# Patient Record
Sex: FEMALE | Race: WHITE | NOT HISPANIC OR LATINO | Employment: FULL TIME | URBAN - METROPOLITAN AREA
[De-identification: names, ages, dates, MRNs, and addresses within clinical notes are randomized per-mention and may not be internally consistent; named-entity substitution may affect disease eponyms.]

---

## 2017-02-01 ENCOUNTER — OFFICE VISIT (OUTPATIENT)
Dept: FAMILY MEDICINE | Facility: OTHER | Age: 57
End: 2017-02-01
Payer: COMMERCIAL

## 2017-02-01 VITALS
BODY MASS INDEX: 32.96 KG/M2 | RESPIRATION RATE: 18 BRPM | TEMPERATURE: 98.3 F | SYSTOLIC BLOOD PRESSURE: 118 MMHG | WEIGHT: 186 LBS | HEIGHT: 63 IN | DIASTOLIC BLOOD PRESSURE: 68 MMHG | HEART RATE: 76 BPM

## 2017-02-01 DIAGNOSIS — J06.9 UPPER RESPIRATORY TRACT INFECTION, UNSPECIFIED TYPE: ICD-10-CM

## 2017-02-01 DIAGNOSIS — H10.023 PINK EYE DISEASE OF BOTH EYES: Primary | ICD-10-CM

## 2017-02-01 PROCEDURE — 99203 OFFICE O/P NEW LOW 30 MIN: CPT | Performed by: NURSE PRACTITIONER

## 2017-02-01 RX ORDER — AZITHROMYCIN 250 MG/1
TABLET, FILM COATED ORAL
Qty: 6 TABLET | Refills: 0 | Status: SHIPPED | OUTPATIENT
Start: 2017-02-01 | End: 2017-10-17

## 2017-02-01 RX ORDER — POLYMYXIN B SULFATE AND TRIMETHOPRIM 1; 10000 MG/ML; [USP'U]/ML
1 SOLUTION OPHTHALMIC 4 TIMES DAILY
Qty: 2 ML | Refills: 0 | Status: SHIPPED | OUTPATIENT
Start: 2017-02-01 | End: 2017-02-08

## 2017-02-01 ASSESSMENT — PAIN SCALES - GENERAL: PAINLEVEL: NO PAIN (0)

## 2017-02-01 NOTE — NURSING NOTE
"Chief Complaint   Patient presents with     Cough       Initial /68 mmHg  Pulse 76  Temp(Src) 98.3  F (36.8  C) (Oral)  Resp 18  Ht 5' 3\" (1.6 m)  Wt 186 lb (84.369 kg)  BMI 32.96 kg/m2 Estimated body mass index is 32.96 kg/(m^2) as calculated from the following:    Height as of this encounter: 5' 3\" (1.6 m).    Weight as of this encounter: 186 lb (84.369 kg).  BP completed using cuff size: regular    "

## 2017-02-01 NOTE — PATIENT INSTRUCTIONS
Please take antibiotic with a probiotic or yogurt. Return to clinic if symptoms worsen or do not improve with treatment.    Thank you  Halie Hernandes CNP

## 2017-02-01 NOTE — MR AVS SNAPSHOT
"              After Visit Summary   2/1/2017    Shelbi Howard    MRN: [id number]           Patient Information     Date Of Birth          1960        Visit Information        Provider Department      2/1/2017 1:20 PM Halie Hernandes APRN CNP [facility]        Today's Diagnoses     Pink eye disease of both eyes    -  1     Upper respiratory tract infection, unspecified type           Care Instructions    Please take antibiotic with a probiotic or yogurt. Return to clinic if symptoms worsen or do not improve with treatment.    Thank you  Halie Hernandes CNP          Follow-ups after your visit        Who to contact     If you have questions or need follow up information about today's clinic visit or your schedule please contact [facility] directly at [phone].  Normal or non-critical lab and imaging results will be communicated to you by MyChart, letter or phone within 4 business days after the clinic has received the results. If you do not hear from us within 7 days, please contact the clinic through MyChart or phone. If you have a critical or abnormal lab result, we will notify you by phone as soon as possible.  Submit refill requests through [employer] or call your pharmacy and they will forward the refill request to us. Please allow 3 business days for your refill to be completed.          Additional Information About Your Visit        MyChart Information     [employer] lets you send messages to your doctor, view your test results, renew your prescriptions, schedule appointments and more. To sign up, go to [email or web address] . Click on \"Log in\" on the left side of the screen, which will take you to the Welcome page. Then click on \"Sign up Now\" on the right side of the page.     You will be asked to enter the access code listed below, as well as some personal information. Please follow the directions to create your username and password.     Your access code is: " "[email or web address]  Expires: 2017  1:58 PM     Your access code will  in 90 days. If you need help or a new code, please call your [place] clinic or [phone].        Care EveryWhere ID     This is your Care EveryWhere ID. This could be used by other organizations to access your [place] medical records  [id number]        Your Vitals Were     Pulse Temperature Respirations Height BMI (Body Mass Index)       76 98.3  F (36.8  C) (Oral) 18 5' 3\" (1.6 m) 32.96 kg/m2        Blood Pressure from Last 3 Encounters:   17 118/68    Weight from Last 3 Encounters:   17 186 lb (84.369 kg)              Today, you had the following     No orders found for display         Today's Medication Changes          These changes are accurate as of: 17  1:58 PM.  If you have any questions, ask your nurse or doctor.               Start taking these medicines.        Dose/Directions    azithromycin 250 MG tablet   Commonly known as:  ZITHROMAX   Used for:  Upper respiratory tract infection, unspecified type   Started by:  Halie Hernandes APRN CNP        Two tablets first day, then one tablet daily for four days.   Quantity:  6 tablet   Refills:  0       trimethoprim-polymyxin b ophthalmic solution   Commonly known as:  POLYTRIM   Used for:  Pink eye disease of both eyes   Started by:  Halie Hernandes APRN CNP        Dose:  1 drop   Place 1 drop into both eyes 4 times daily for 7 days   Quantity:  2 mL   Refills:  0            Where to get your medicines      These medications were sent to [facility] [address] [place] [address] [facility]  [address] [facility] [address]     Phone:  [phone]    - azithromycin 250 MG tablet  - trimethoprim-polymyxin b ophthalmic solution             Primary Care Provider    None Specified       No primary provider on file.        Thank you!     Thank you for choosing [facility] [place]  for your care. Our goal is always to provide you " with excellent care. Hearing back from our patients is one way we can continue to improve our services. Please take a few minutes to complete the written survey that you may receive in the mail after your visit with us. Thank you!             Your Updated Medication List - Protect others around you: Learn how to safely use, store and throw away your medicines at www.disposemymeds.org.          This list is accurate as of: 2/1/17  1:58 PM.  Always use your most recent med list.                   Brand Name Dispense Instructions for use    azithromycin 250 MG tablet    ZITHROMAX    6 tablet    Two tablets first day, then one tablet daily for four days.       trimethoprim-polymyxin b ophthalmic solution    POLYTRIM    2 mL    Place 1 drop into both eyes 4 times daily for 7 days

## 2017-02-01 NOTE — PROGRESS NOTES
"  SUBJECTIVE:                                                    Shelbi Howard is a 57 year old female who presents to clinic today for the following health issues:      HPI    Acute Illness   Acute illness concerns: Cough  Onset: Friday or Saturday      Fever: no     Chills/Sweats: YES    Headache (location?): YES    Sinus Pressure:YES    Conjunctivitis:  YES: both    Ear Pain: no    Rhinorrhea: no     Congestion: YES    Sore Throat: YES     Cough: YES-productive of yellow sputum    Wheeze: no     Decreased Appetite: YES    Nausea: no     Vomiting: no     Diarrhea:  no     Dysuria/Freq.: no     Fatigue/Achiness: YES    Sick/Strep Exposure: YES- mom (lives with her)     Therapies Tried and outcome: none     Both eyes red itchy and mattered used warm compresses is helping a little.  Problem list and histories reviewed & adjusted, as indicated.  Additional history: as documented    Problem list, Medication list, Allergies, and Medical/Social/Surgical histories reviewed in EPIC and updated as appropriate.    ROS:  Constitutional, HEENT, cardiovascular, pulmonary, gi and gu systems are negative, except as otherwise noted.    OBJECTIVE:                                                    /68 mmHg  Pulse 76  Temp(Src) 98.3  F (36.8  C) (Oral)  Resp 18  Ht 5' 3\" (1.6 m)  Wt 186 lb (84.369 kg)  BMI 32.96 kg/m2  Body mass index is 32.96 kg/(m^2).  GENERAL: healthy, alert and no distress  EYES: erythematous bilateral with conjunctiva, tan matter  HENT: normal cephalic/atraumatic, right ear: erythematous, left ear: erythematous, nose and mouth without ulcers or lesions, nasal mucosa edematous , rhinorrhea green, oropharynx clear and oral mucous membranes moist  NECK: bilateral anterior cervical adenopathy, no asymmetry, masses, or scars and thyroid normal to palpation  RESP: no wheezes, rhonchi R mid posterior and R lower posterior and bronchial breath sounds throughout  CV: regular rate and rhythm, normal S1 S2, no " S3 or S4, no murmur, click or rub, no peripheral edema and peripheral pulses strong  ABDOMEN: soft, nontender, no hepatosplenomegaly, no masses and bowel sounds normal  MS: no gross musculoskeletal defects noted, no edema    Diagnostic Test Results:  none      ASSESSMENT/PLAN:                                                      1. Pink eye disease of both eyes    - trimethoprim-polymyxin b (POLYTRIM) ophthalmic solution; Place 1 drop into both eyes 4 times daily for 7 days  Dispense: 2 mL; Refill: 0    2. Upper respiratory tract infection, unspecified type    - azithromycin (ZITHROMAX) 250 MG tablet; Two tablets first day, then one tablet daily for four days.  Dispense: 6 tablet; Refill: 0    Is new patient at [place] will sing EMY and send for record from [facility]    See Patient Instructions    BERLIN Jones [facility]

## 2017-03-03 ENCOUNTER — TELEPHONE (OUTPATIENT)
Dept: FAMILY MEDICINE | Facility: OTHER | Age: 57
End: 2017-03-03

## 2017-03-03 NOTE — TELEPHONE ENCOUNTER
Summary:    Patient is due/failing the following:   COLONOSCOPY, LDL, MAMMOGRAM and PAP    Action needed:   Patient needs office visit for PAP., Patient needs fasting lab only appointment and schedule a mammogram and colonoscopy     Type of outreach:    Phone, left message for patient to call back.     Questions for provider review:    None                                                                                                                                    Suzi Trinidad       Chart routed to Care Team .          Panel Management Review      Patient has the following on her problem list: None      Composite cancer screening  Chart review shows that this patient is due/due soon for the following Pap Smear, Mammogram and Colonoscopy

## 2017-03-15 ENCOUNTER — TELEPHONE (OUTPATIENT)
Dept: OTHER | Facility: CLINIC | Age: 57
End: 2017-03-15

## 2017-04-12 ENCOUNTER — TELEPHONE (OUTPATIENT)
Dept: FAMILY MEDICINE | Facility: OTHER | Age: 57
End: 2017-04-12

## 2017-04-12 NOTE — TELEPHONE ENCOUNTER
Panel management:    Please call patient and assist in scheduling mammogram, FIT or colonoscopy, physical with/out PAP and fasting labs    Halie Hernandes [facility]

## 2017-04-13 NOTE — TELEPHONE ENCOUNTER
Patient returned call and received message below will call back once they know their schedule     Anne Kim  Reception/ Scheduling

## 2017-06-22 ENCOUNTER — TELEPHONE (OUTPATIENT)
Dept: FAMILY MEDICINE | Facility: OTHER | Age: 57
End: 2017-06-22

## 2017-06-22 NOTE — TELEPHONE ENCOUNTER
Summary:    Patient is due/failing the following:   COLONOSCOPY, LDL, MAMMOGRAM and PAP    Action needed:   Patient needs office visit for PAP., Patient needs fasting lab only appointment and schedule a mammogram and colonoscopy or complete a FIT test     Type of outreach:    phone person answered and hung up. Reminder letter sent    Questions for provider review:    None                                                                                                                                    Suzi Trinidad       Chart routed to Care Team .    Panel Management Review      Patient has the following on her problem list: None      Composite cancer screening  Chart review shows that this patient is due/due soon for the following Pap Smear, Mammogram and Colonoscopy

## 2017-06-22 NOTE — LETTER
[address]   [facility] [address]  Phone: [phone]  June 22, 2017      Shelbi Howard  [address] [address] [facility] [address]      Dear Shelbi,    We care about your health and have reviewed your health plan including your medical conditions, medications, and lab results.  Based on this review, it is recommended that you follow up regarding the following health topic(s):  -Breast Cancer Screening  -Colon Cancer Screening  -Cervical Cancer Screening    We recommend you take the following action(s):  -schedule a MAMMOGRAM which is due. Please disregard this reminder if you have had this exam elsewhere within the last 1-2 years please let us know so we can update your records.  -schedule a COLONOSCOPY to look for colon cancer (due every 10 years or 5 years in higher risk situations.)  Colonoscopies can prevent 90-95% of colon cancer deaths.  Problem lesions can be removed before they ever become cancer.  If you do not wish to do a colonoscopy or cannot afford to do one at this time, there is another option called a Fecal Immunochemical Occult Blood Test (FIT) a take home stool sample kit.  It does not replace the colonoscopy for colorectal cancer screening, but it can detect hidden bleeding in the lower colon.  It does need to be repeated every year and if a positive result is obtained, you would be referred for a colonoscopy.  If you have completed either one of these tests at another facility, please have the records sent to our clinic for our records.  -schedule a PAP SMEAR EXAM which is due.  Please disregard this reminder if you have had this exam elsewhere within the last year.  It would be helpful for us to have a copy of your recent pap smear report to update your records.     Please call us at the [facility] - [phone] (or use [employer]) to address the above recommendations.     Thank you for trusting [facility] and we appreciate the  opportunity to serve you.  We look forward to supporting your healthcare needs in the future.    Healthy Regards,    Your Health Care Team  [facility]

## 2017-09-28 ENCOUNTER — TELEPHONE (OUTPATIENT)
Dept: FAMILY MEDICINE | Facility: OTHER | Age: 57
End: 2017-09-28

## 2017-09-28 NOTE — TELEPHONE ENCOUNTER
Summary:    Patient is due/failing the following:   COLONOSCOPY, MAMMOGRAM and PAP    Action needed:   Patient needs office visit for PAP. and schedule a mammogram and colonoscopy or complete a FIT test     Type of outreach:    Sent letter.    Questions for provider review:    None                                                                                                                                    Suzi Trinidad       Chart routed to Care Team .    Panel Management Review      Patient has the following on her problem list: None      Composite cancer screening  Chart review shows that this patient is due/due soon for the following Pap Smear, Mammogram and Colonoscopy

## 2017-09-28 NOTE — LETTER
[address]   [facility] [address]  Phone: [phone]  September 28, 2017      Shelbi Howard  [address] [address] [facility] [address]      Dear Shelbi,    We care about your health and have reviewed your health plan including your medical conditions, medications, and lab results.  Based on this review, it is recommended that you follow up regarding the following health topic(s):  -Breast Cancer Screening  -Colon Cancer Screening  -Cervical Cancer Screening    We recommend you take the following action(s):  -schedule a MAMMOGRAM which is due. Please disregard this reminder if you have had this exam elsewhere within the last 1-2 years please let us know so we can update your records.  -schedule a COLONOSCOPY to look for colon cancer (due every 10 years or 5 years in higher risk situations.)  Colonoscopies can prevent 90-95% of colon cancer deaths.  Problem lesions can be removed before they ever become cancer.  If you do not wish to do a colonoscopy or cannot afford to do one at this time, there is another option called a Fecal Immunochemical Occult Blood Test (FIT) a take home stool sample kit.  It does not replace the colonoscopy for colorectal cancer screening, but it can detect hidden bleeding in the lower colon.  It does need to be repeated every year and if a positive result is obtained, you would be referred for a colonoscopy.  If you have completed either one of these tests at another facility, please have the records sent to our clinic for our records.  -schedule a PAP SMEAR EXAM which is due.  Please disregard this reminder if you have had this exam elsewhere within the last year.  It would be helpful for us to have a copy of your recent pap smear report to update your records.     Please call us at the [facility] - [phone] (or use [employer]) to address the above recommendations.     Thank you for trusting [facility] and we appreciate the  opportunity to serve you.  We look forward to supporting your healthcare needs in the future.    Healthy Regards,    Your Health Care Team  [facility]

## 2017-10-17 ENCOUNTER — OFFICE VISIT (OUTPATIENT)
Dept: OBGYN | Facility: OTHER | Age: 57
End: 2017-10-17
Payer: COMMERCIAL

## 2017-10-17 VITALS
HEART RATE: 68 BPM | BODY MASS INDEX: 34.85 KG/M2 | SYSTOLIC BLOOD PRESSURE: 138 MMHG | DIASTOLIC BLOOD PRESSURE: 68 MMHG | WEIGHT: 196.75 LBS

## 2017-10-17 DIAGNOSIS — N95.0 POST-MENOPAUSAL BLEEDING: Primary | ICD-10-CM

## 2017-10-17 DIAGNOSIS — Z12.4 SCREENING FOR MALIGNANT NEOPLASM OF CERVIX: ICD-10-CM

## 2017-10-17 PROCEDURE — 99203 OFFICE O/P NEW LOW 30 MIN: CPT | Mod: 25 | Performed by: ADVANCED PRACTICE MIDWIFE

## 2017-10-17 PROCEDURE — 87624 HPV HI-RISK TYP POOLED RSLT: CPT | Performed by: ADVANCED PRACTICE MIDWIFE

## 2017-10-17 PROCEDURE — G0145 SCR C/V CYTO,THINLAYER,RESCR: HCPCS | Performed by: ADVANCED PRACTICE MIDWIFE

## 2017-10-17 PROCEDURE — G0124 SCREEN C/V THIN LAYER BY MD: HCPCS | Performed by: ADVANCED PRACTICE MIDWIFE

## 2017-10-17 PROCEDURE — 58100 BIOPSY OF UTERUS LINING: CPT | Performed by: ADVANCED PRACTICE MIDWIFE

## 2017-10-17 PROCEDURE — 88305 TISSUE EXAM BY PATHOLOGIST: CPT | Performed by: ADVANCED PRACTICE MIDWIFE

## 2017-10-17 NOTE — NURSING NOTE
"Chief Complaint   Patient presents with     Abnormal Uterine Bleeding     post menopausal bleeding       Initial /68 (BP Location: Right arm, Patient Position: Chair, Cuff Size: Adult Regular)  Pulse 68  Wt 196 lb 12 oz (89.2 kg)  BMI 34.85 kg/m2 Estimated body mass index is 34.85 kg/(m^2) as calculated from the following:    Height as of 2/1/17: 5' 3\" (1.6 m).    Weight as of this encounter: 196 lb 12 oz (89.2 kg).  Medication Reconciliation: complete   Judy Escobar CMA      "

## 2017-10-17 NOTE — PATIENT INSTRUCTIONS
What you may expect after an endometrial biopsy:  Mild cramping for less than 48 hours is to be expected, if you have can take ibuprofen or Motrin you may use this for the cramping.   A small amount of bleeding would be considered normal as well.    You may resume your normal activities including sexual intercourse as soon as you feel ready.       WARNING SIGNS:  If you are experiencing:  Fever  Foul smelling vaginal discharge  Cramping lasting longer than 48 hours  Severe cramping  Bleeding heavier than a period  CALL THE CLINIC IMMEDIATELY at [phone] for the [facility]   You will be contacted with the results of your biopsy in about one week.   A follow up plan will be made with you when your results are available.

## 2017-10-17 NOTE — MR AVS SNAPSHOT
After Visit Summary   10/17/2017    Shelbi Howard    MRN: [id number]           Patient Information     Date Of Birth          1960        Visit Information        Provider Department      10/17/2017 3:30 PM Lisa Lopez APRN CNM [facility]        Today's Diagnoses     Post-menopausal bleeding    -  1    Screening for malignant neoplasm of cervix          Care Instructions    What you may expect after an endometrial biopsy:  Mild cramping for less than 48 hours is to be expected, if you have can take ibuprofen or Motrin you may use this for the cramping.   A small amount of bleeding would be considered normal as well.    You may resume your normal activities including sexual intercourse as soon as you feel ready.       WARNING SIGNS:  If you are experiencing:  Fever  Foul smelling vaginal discharge  Cramping lasting longer than 48 hours  Severe cramping  Bleeding heavier than a period  CALL THE CLINIC IMMEDIATELY at [phone] for the [facility]   You will be contacted with the results of your biopsy in about one week.   A follow up plan will be made with you when your results are available.               Follow-ups after your visit        Follow-up notes from your care team     Return if symptoms worsen or fail to improve.      Who to contact     If you have questions or need follow up information about today's clinic visit or your schedule please contact [facility] directly at [phone].  Normal or non-critical lab and imaging results will be communicated to you by MyChart, letter or phone within 4 business days after the clinic has received the results. If you do not hear from us within 7 days, please contact the clinic through MyChart or phone. If you have a critical or abnormal lab result, we will notify you by phone as soon as possible.  Submit refill requests through [employer] or call your pharmacy and they will forward the refill request to us.  "Please allow 3 business days for your refill to be completed.          Additional Information About Your Visit        MyChart Information     [employer] lets you send messages to your doctor, view your test results, renew your prescriptions, schedule appointments and more. To sign up, go to [email or web address] . Click on \"Log in\" on the left side of the screen, which will take you to the Welcome page. Then click on \"Sign up Now\" on the right side of the page.     You will be asked to enter the access code listed below, as well as some personal information. Please follow the directions to create your username and password.     Your access code is: [email or web address]  Expires: 1/15/2018  4:02 PM     Your access code will  in 90 days. If you need help or a new code, please call your [place] clinic or [phone].        Care EveryWhere ID     This is your Care EveryWhere ID. This could be used by other organizations to access your [place] medical records  [id number]        Your Vitals Were     Pulse BMI (Body Mass Index)                68 34.85 kg/m2           Blood Pressure from Last 3 Encounters:   10/17/17 138/68   17 118/68    Weight from Last 3 Encounters:   10/17/17 196 lb 12 oz (89.2 kg)   17 186 lb (84.4 kg)              We Performed the Following     ENDOMETRIAL BIOPSY W/O CERVICAL DILATION     Surgical pathology exam        Primary Care Provider    None Specified       No primary provider on file.        Equal Access to Services     JOSSELINE [facility] : Hadii sydnee Holder, wajaidenda jackie, qaybta kaalmada kati, favian richardson . So [facility] [phone].    ATENCIÓN: Si habla español, tiene a hagen disposición servicios gratuitos de asistencia lingüística. Llame al [phone].    We comply with applicable federal civil rights laws and [place] laws. We do not discriminate on the basis of race, color, national origin, age, disability, sex, sexual orientation, or " gender identity.            Thank you!     Thank you for choosing [facility]  for your care. Our goal is always to provide you with excellent care. Hearing back from our patients is one way we can continue to improve our services. Please take a few minutes to complete the written survey that you may receive in the mail after your visit with us. Thank you!             Your Updated Medication List - Protect others around you: Learn how to safely use, store and throw away your medicines at www.disposemymeds.org.      Notice  As of 10/17/2017  4:04 PM    You have not been prescribed any medications.

## 2017-10-19 LAB — COPATH REPORT: NORMAL

## 2017-10-24 LAB
COPATH REPORT: ABNORMAL
PAP: ABNORMAL

## 2017-10-26 LAB
FINAL DIAGNOSIS: NORMAL
HPV HR 12 DNA CVX QL NAA+PROBE: NEGATIVE
HPV16 DNA SPEC QL NAA+PROBE: NEGATIVE
HPV18 DNA SPEC QL NAA+PROBE: NEGATIVE
SPECIMEN DESCRIPTION: NORMAL

## 2017-11-03 ENCOUNTER — TELEPHONE (OUTPATIENT)
Dept: OBGYN | Facility: OTHER | Age: 57
End: 2017-11-03

## 2017-11-03 NOTE — TELEPHONE ENCOUNTER
Per Lisa Auguste Day contacted patient notified her she is scheduled her with Dr Parekh Gyn [facility]cology [facility] for 11/20 @ 3:40pm [address] [facility] [address] [address].

## 2017-11-07 ASSESSMENT — ENCOUNTER SYMPTOMS
DECREASED LIBIDO: 0
HOT FLASHES: 0

## 2017-11-20 ENCOUNTER — OFFICE VISIT (OUTPATIENT)
Dept: SURGERY | Facility: CLINIC | Age: 57
End: 2017-11-20

## 2017-11-20 ENCOUNTER — ONCOLOGY VISIT (OUTPATIENT)
Dept: ONCOLOGY | Facility: CLINIC | Age: 57
End: 2017-11-20
Attending: OBSTETRICS & GYNECOLOGY
Payer: COMMERCIAL

## 2017-11-20 ENCOUNTER — ALLIED HEALTH/NURSE VISIT (OUTPATIENT)
Dept: SURGERY | Facility: CLINIC | Age: 57
End: 2017-11-20

## 2017-11-20 ENCOUNTER — ANESTHESIA EVENT (OUTPATIENT)
Dept: SURGERY | Facility: CLINIC | Age: 57
End: 2017-11-20

## 2017-11-20 VITALS
HEART RATE: 65 BPM | WEIGHT: 189.1 LBS | DIASTOLIC BLOOD PRESSURE: 81 MMHG | SYSTOLIC BLOOD PRESSURE: 164 MMHG | BODY MASS INDEX: 33.5 KG/M2 | OXYGEN SATURATION: 96 % | TEMPERATURE: 98 F | HEIGHT: 63 IN | RESPIRATION RATE: 16 BRPM

## 2017-11-20 VITALS
DIASTOLIC BLOOD PRESSURE: 78 MMHG | HEIGHT: 63 IN | WEIGHT: 190.3 LBS | OXYGEN SATURATION: 95 % | RESPIRATION RATE: 18 BRPM | BODY MASS INDEX: 33.72 KG/M2 | TEMPERATURE: 97.8 F | SYSTOLIC BLOOD PRESSURE: 194 MMHG | HEART RATE: 61 BPM

## 2017-11-20 DIAGNOSIS — Z01.818 PREOP EXAMINATION: Primary | ICD-10-CM

## 2017-11-20 DIAGNOSIS — C54.1 ENDOMETRIAL CANCER (H): Primary | ICD-10-CM

## 2017-11-20 DIAGNOSIS — Z01.818 PREOPERATIVE EXAMINATION: ICD-10-CM

## 2017-11-20 DIAGNOSIS — C54.1 ENDOMETRIAL CARCINOMA (H): ICD-10-CM

## 2017-11-20 LAB
ANION GAP SERPL CALCULATED.3IONS-SCNC: 6 MMOL/L (ref 3–14)
BUN SERPL-MCNC: 15 MG/DL (ref 7–30)
CALCIUM SERPL-MCNC: 9.2 MG/DL (ref 8.5–10.1)
CHLORIDE SERPL-SCNC: 104 MMOL/L (ref 94–109)
CO2 SERPL-SCNC: 27 MMOL/L (ref 20–32)
CREAT SERPL-MCNC: 0.65 MG/DL (ref 0.52–1.04)
ERYTHROCYTE [DISTWIDTH] IN BLOOD BY AUTOMATED COUNT: 12.8 % (ref 10–15)
GFR SERPL CREATININE-BSD FRML MDRD: >90 ML/MIN/1.7M2
GLUCOSE SERPL-MCNC: 274 MG/DL (ref 70–99)
HCT VFR BLD AUTO: 46.6 % (ref 35–47)
HGB BLD-MCNC: 15.9 G/DL (ref 11.7–15.7)
MCH RBC QN AUTO: 31.1 PG (ref 26.5–33)
MCHC RBC AUTO-ENTMCNC: 34.1 G/DL (ref 31.5–36.5)
MCV RBC AUTO: 91 FL (ref 78–100)
PLATELET # BLD AUTO: 208 10E9/L (ref 150–450)
POTASSIUM SERPL-SCNC: 4.2 MMOL/L (ref 3.4–5.3)
RBC # BLD AUTO: 5.12 10E12/L (ref 3.8–5.2)
SODIUM SERPL-SCNC: 137 MMOL/L (ref 133–144)
WBC # BLD AUTO: 7.9 10E9/L (ref 4–11)

## 2017-11-20 PROCEDURE — 99212 OFFICE O/P EST SF 10 MIN: CPT | Mod: ZF

## 2017-11-20 PROCEDURE — 86901 BLOOD TYPING SEROLOGIC RH(D): CPT | Performed by: OBSTETRICS & GYNECOLOGY

## 2017-11-20 PROCEDURE — 86900 BLOOD TYPING SEROLOGIC ABO: CPT | Performed by: OBSTETRICS & GYNECOLOGY

## 2017-11-20 PROCEDURE — 86850 RBC ANTIBODY SCREEN: CPT | Performed by: OBSTETRICS & GYNECOLOGY

## 2017-11-20 PROCEDURE — 99205 OFFICE O/P NEW HI 60 MIN: CPT | Mod: 57 | Performed by: OBSTETRICS & GYNECOLOGY

## 2017-11-20 PROCEDURE — 36415 COLL VENOUS BLD VENIPUNCTURE: CPT | Performed by: OBSTETRICS & GYNECOLOGY

## 2017-11-20 NOTE — MR AVS SNAPSHOT
After Visit Summary   2017    Shelbi Howard    MRN: [id number]           Patient Information     Date Of Birth          1960        Visit Information        Provider Department      2017 11:30 AM Rn [facility]        Care Instructions    Preparing for Your Surgery      Name:  Shelbi Howard   MRN:  [id number]   :  1960   Today's Date:  2017     Arriving for surgery: Pre-Operative Assessment Center will call you with surgery date, surgery time, arrival time, and location. ([phone] Mon- Fri 7 am- 7 pm)  Surgery date:    Surgery time:    Arrival time:    Please come to:     -  Bring your ID and insurance card.    What can I eat or drink? Pre-Operative Assessment Center will call you with exact times.  -  You may have solid food or milk products until 8 hours prior to your surgery.   -  You may have water, apple juice, BLACK coffee (NO creamer or nondairy creamer), or 7up/Sprite until 2 hours prior to your surgery.     Which medicines can I take?       -  Do not bring your own medications to the hospital.        -  Follow GYN Oncology Clinic instructions regarding Ibuprofen. If no instructions given, NO Ibuprofen the day prior to surgery.         -  Hold Naproxen 2 days prior to surgery.    -  Please take these medications the morning of surgery:  Acetaminophen (Tylenol) if needed    How do I prepare myself?  -  Take two showers: one the night before surgery; and one the morning of surgery.         Use Scrubcare or Hibiclens to wash from neck down.  You may use your own shampoo and conditioner. No other hair products.   -  Do NOT use lotion, powder, colognes, deodorant, or antiperspirant the day of your surgery.  -  Do NOT wear any makeup, fingernail polish or jewelry.    Questions or Concerns:  If you have questions or concerns, please call the  Preoperative Assessment Center, Monday-Friday 7AM-7PM:  [phone]    AFTER YOUR  SURGERY  Breathing exercises   Breathing exercises help you recover faster. Take deep breaths and let the air out slowly. This will:     Help you wake up after surgery.    Help prevent complications like pneumonia.  Preventing complications will help you go home sooner.   We may give you a breathing device (incentive spirometer) to encourage you to breathe deeply.   Nausea and vomiting   You may feel sick to your stomach after surgery; if so, let your nurse know.    Pain control:  After surgery, you may have pain. Our goal is to help you manage your pain. Pain medicine will help you feel comfortable enough to do activities that will help you heal.  These activities may include breathing exercises, walking and physical therapy.   To help your health care team treat your pain we will ask: 1) If you have pain  2) where it is located 3) describe your pain in your words  Methods of pain control include medications given by mouth, vein or by nerve block for some surgeries.  Sequential Compression Device (SCD) or Pneumo Boots:  You may need to wear SCD S on your legs or feet. These are wraps connected to a machine that pumps in air and releases it. The repeated pumping helps prevent blood clots from forming.                     Follow-ups after your visit        Your next 10 appointments already scheduled     Nov 20, 2017 12:10 PM CST   (Arrive by 11:55 AM)   PAC Anesthesia Consult with  Pac Anesthesiologist   [facility] ([facility] [place])    [address] [place] [id number] [phone]            Nov 20, 2017 12:30 PM CST   LAB with  LAB   [facility] ([facility] [place])    [address] [place] [id number] [phone]           Please do not eat 10-12 hours before your appointment if you are coming in fasting for labs on lipids, cholesterol, or glucose (sugar). This does not apply to pregnant  women. Water, hot tea and black coffee (with nothing added) are okay. Do not drink other fluids, diet soda or chew gum.            Dec 18, 2017 10:40 AM CST   (Arrive by 10:25 AM)   Return Visit with Facundo Parekh MD   [facility] ([facility])    [address] [id number] [phone]              Future tests that were ordered for you today     Open Future Orders        Priority Expected Expires Ordered    CBC with platelets Routine 11/20/2017 12/20/2017 11/20/2017    Basic metabolic panel Routine 11/20/2017 12/20/2017 11/20/2017            Who to contact     Please call your clinic at [phone] to:    Ask questions about your health    Make or cancel appointments    Discuss your medicines    Learn about your test results    Speak to your doctor   If you have compliments or concerns about an experience at your clinic, or if you wish to file a complaint, please contact [facility] Patient Relations at [phone] or email us at [email or web address]         Additional Information About Your Visit        [employer] Information     [employer] gives you secure access to your electronic health record. If you see a primary care provider, you can also send messages to your care team and make appointments. If you have questions, please call your primary care clinic.  If you do not have a primary care provider, please call [phone] and they will assist you.      [employer] is an electronic gateway that provides easy, online access to your medical records. With [employer], you can request a clinic appointment, read your test results, renew a prescription or communicate with your care team.     To access your existing account, please contact your [facility] or call [phone] for assistance.        Care EveryWhere ID     This is your Care EveryWhere ID. This could be used by  other organizations to access your [place] medical records  [id number]         Blood Pressure from Last 3 Encounters:   11/20/17 191/87   11/20/17 194/78   10/17/17 138/68    Weight from Last 3 Encounters:   11/20/17 85.8 kg (189 lb 1.6 oz)   11/20/17 86.3 kg (190 lb 4.8 oz)   10/17/17 89.2 kg (196 lb 12 oz)              Today, you had the following     No orders found for display       Primary Care Provider    Physician No Ref-Primary       NO REF-PRIMARY PHYSICIAN        Equal Access to Services     [facility]: Hadii aad ku hadasho Sohugh, waaxda luqadaha, qaybta kaalmada kati, favian richardson . So [facility] [phone].    ATENCIÓN: Si habla español, tiene a hagen disposición servicios gratuitos de asistencia lingüística. Llame al [phone].    We comply with applicable federal civil rights laws and [place] laws. We do not discriminate on the basis of race, color, national origin, age, disability, sex, sexual orientation, or gender identity.            Thank you!     Thank you for choosing [facility]  for your care. Our goal is always to provide you with excellent care. Hearing back from our patients is one way we can continue to improve our services. Please take a few minutes to complete the written survey that you may receive in the mail after your visit with us. Thank you!             Your Updated Medication List - Protect others around you: Learn how to safely use, store and throw away your medicines at www.disposemymeds.org.          This list is accurate as of: 11/20/17 11:42 AM.  Always use your most recent med list.                   Brand Name Dispense Instructions for use Diagnosis    ALEVE PO      Take 2 tablets by mouth 2 times daily as needed for moderate pain

## 2017-11-20 NOTE — MR AVS SNAPSHOT
After Visit Summary   11/20/2017    Shelbi Howard    MRN: [id number]           Patient Information     Date Of Birth          1960        Visit Information        Provider Department      11/20/2017 9:00 AM Facundo Parekh MD [facility]        Today's Diagnoses     Endometrial cancer (H)    -  1    Preoperative examination           Follow-ups after your visit        Additional Services     PAC Visit Referral (For [facility] Only)       H&P with assessment. Surgery will be within 30 days of visit.                  Your next 10 appointments already scheduled     Nov 24, 2017  1:40 PM CST   US PELVIC COMPLETE W TRANSVAGINAL with ERUS1   [facility] ([facility])    [address] [id number] [phone]           Please bring a list of your medicines (including vitamins, minerals and over-the-counter drugs). Also, tell your doctor about any allergies you may have. Wear comfortable clothes and leave your valuables at home.  Adults: Drink six 8-ounce glasses of fluid one hour before your exam. Do NOT empty your bladder.  If you need to empty your bladder before your exam, try to release only a little bit of urine. Then, drink another 8oz glass of fluid.  Children: Children who are potty trained should drink at least 4 cups (32 oz) of liquid 45 minutes to one hour prior to the exam. The child s bladder must be full in order to achieve a diagnostic exam. If your child is very uncomfortable or has an urgent need to pee, please notify a technologist; they will try to find out how much longer the wait may be and provide instructions to help relieve the pressure. Occasionally it is medically necessary to insert a urinary catheter to fill the bladder.  Please call the Imaging Department at your exam site with any questions.            Dec 05, 2017   Procedure with Facundo Parekh MD   [facility] [place], Same Day Surgery (--)    [address]  [facility] [place] [place] [address]   [phone]            Dec 18, 2017 10:40 AM CST   (Arrive by 10:25 AM)   Return Visit with Facundo Parekh MD   [facility] ([facility])    [address]  2nd Floor  [facility] [id number] [phone]              Who to contact     If you have questions or need follow up information about today's clinic visit or your schedule please contact [facility] directly at [phone].  Normal or non-critical lab and imaging results will be communicated to you by B[employer], letter or phone within 4 business days after the clinic has received the results. If you do not hear from us within 7 days, please contact the clinic through [employer] or phone. If you have a critical or abnormal lab result, we will notify you by phone as soon as possible.  Submit refill requests through [employer] or call your pharmacy and they will forward the refill request to us. Please allow 3 business days for your refill to be completed.          Additional Information About Your Visit        MyChart Information     [employer] gives you secure access to your electronic health record. If you see a primary care provider, you can also send messages to your care team and make appointments. If you have questions, please call your primary care clinic.  If you do not have a primary care provider, please call [phone] and they will assist you.        Care EveryWhere ID     This is your Care EveryWhere ID. This could be used by other organizations to access your [place] medical records  [id number]        Your Vitals Were     Pulse Temperature Respirations Height Pulse Oximetry BMI (Body Mass Index)    61 97.8  F (36.6  C) (Oral) 18 1.601 m (5' 3.03\") 95% 33.68 kg/m2       Blood Pressure from Last 3 Encounters:   11/20/17 164/81   11/20/17 194/78   10/17/17 138/68    Weight from Last 3 Encounters:   11/20/17 85.8 kg (189 lb 1.6 oz)   11/20/17 86.3 kg " (190 lb 4.8 oz)   10/17/17 89.2 kg (196 lb 12 oz)              We Performed the Following     ABO/Rh type and screen     PAC Visit Referral (For [facility] Only)     Shauna-Operative Worksheet - Ashley Total Laparoscopic Hysterectomy, bilateral, Salpingo-Oophorectomy, cystoscopy, possible open, possible lymph node dissection        Primary Care Provider    Physician No Ref-Primary       NO REF-PRIMARY PHYSICIAN        Equal Access to Services     [facility] : Hadii aad ku hadasho Soomaali, waaxda luqadaha, qaybta kaalmada adeegyada, waxay idiin hayaan adeeg kharash labetsy . So [facility] [phone].    ATENCIÓN: Si habla espdaniel, tiene a hagen disposición servicios gratuitos de asistencia lingüística. Llame al [phone].    We comply with applicable federal civil rights laws and [place] laws. We do not discriminate on the basis of race, color, national origin, age, disability, sex, sexual orientation, or gender identity.            Thank you!     Thank you for choosing [facility]  for your care. Our goal is always to provide you with excellent care. Hearing back from our patients is one way we can continue to improve our services. Please take a few minutes to complete the written survey that you may receive in the mail after your visit with us. Thank you!             Your Updated Medication List - Protect others around you: Learn how to safely use, store and throw away your medicines at www.disposemymeds.org.          This list is accurate as of: 11/20/17 11:59 PM.  Always use your most recent med list.                   Brand Name Dispense Instructions for use Diagnosis    ALEVE PO      Take 2 tablets by mouth 2 times daily as needed for moderate pain

## 2017-11-20 NOTE — ANESTHESIA PREPROCEDURE EVALUATION
Anesthesia Evaluation     . Pt has had prior anesthetic. Type: General    No history of anesthetic complications          ROS/MED HX    ENT/Pulmonary:     (+)BRETT risk factors snores loudly, , . .    Neurologic:     (+)migraines,     Cardiovascular:  - neg cardiovascular ROS   (+) ----. : . . . :. . No previous cardiac testing       METS/Exercise Tolerance:  >4 METS   Hematologic:  - neg hematologic  ROS       Musculoskeletal:   (+) , , other musculoskeletal- Chronic Back Pain      GI/Hepatic:  - neg GI/hepatic ROS       Renal/Genitourinary:  - ROS Renal section negative       Endo:     (+) Obesity, .      Psychiatric:  - neg psychiatric ROS       Infectious Disease:  - neg infectious disease ROS       Malignancy:   (+) Malignancy History of Other  Other CA Endometrial Cancer Active status post         Other:    (+) no H/O Chronic Pain,                   Physical Exam  Normal systems: cardiovascular and pulmonary    Airway   Mallampati: I  TM distance: >3 FB  Neck ROM: full    Dental   (+) upper dentures and lower dentures    Cardiovascular   Rhythm and rate: regular and normal      Pulmonary    breath sounds clear to auscultation               PAC Discussion and Assessment    ASA Classification: 2  Case is suitable for:   Anesthetic techniques and relevant risks discussed: GA  Invasive monitoring and risk discussed:   Types:   Possibility and Risk of blood transfusion discussed:   NPO instructions given:   Additional anesthetic preparation and risks discussed:   Needs early admission to pre-op area:   Other:     PAC Resident/NP Anesthesia Assessment:  Shelbi Howard is a 57 year old who presents for pre-operative H & P in preparation for a Total Hysterectomy on 12/4/17 with Dr. Parekh for endometrial cancer.   PAC referral for risk assessment and optimization for anesthesia with comorbid conditions of:    Pre-operative considerations:  1.  Cardiac:  Functional status METS >4   Risk of Major Adverse Cardiac  event: 0.9%  -No known cardiac disease  2.  Pulm:   BRETT risk:  Low  -30 pack year smoking history, quit 2/2017  3.  GI:  Risk of PONV score =3 .  If > 2, anti-emetic intervention recommended.    Patient is optimized and is acceptable candidate for the proposed procedure.  No further diagnostic evaluation is needed.    Patient also evaluated by Dr. Deal. See recommendations below.     Angie Brooks MS, PA-C  11/20/17 11:55 AM        Mid-Level Provider/Resident:   Date:   Time:     Attending Anesthesiologist Anesthesia Assessment:  I saw the patient and discussed with the CONCHITA as above.  Patient currently medically optimized for the proposed procedure.   Final anesthetic plan and recommendations to be made by the attending anesthesiologist on the day of surgery.         Reviewed and Signed by PAC Anesthesiologist  Anesthesiologist: KI  Date: 11/20/17  Time:   Pass/Fail: Pass  Disposition:     PAC Pharmacist Assessment:        Pharmacist:   Date:   Time:                           .

## 2017-11-20 NOTE — PATIENT INSTRUCTIONS
Preparing for Your Surgery      Name:  Shelbi Howard   MRN:  [id number]   :  1960   Today's Date:  2017     Arriving for surgery: Pre-Operative Assessment Center will call you with surgery date, surgery time, arrival time, and location. ([phone] Mon- Fri 7 am- 7 pm)  Surgery date:    Surgery time:    Arrival time:    Please come to:     -  Bring your ID and insurance card.    What can I eat or drink? Pre-Operative Assessment Center will call you with exact times.  -  You may have solid food or milk products until 8 hours prior to your surgery.   -  You may have water, apple juice, BLACK coffee (NO creamer or nondairy creamer), or 7up/Sprite until 2 hours prior to your surgery.     Which medicines can I take?       -  Do not bring your own medications to the hospital.        -  Follow GYN Oncology Clinic instructions regarding Ibuprofen. If no instructions given, NO Ibuprofen the day prior to surgery.         -  Hold Naproxen 2 days prior to surgery.    -  Please take these medications the morning of surgery:  Acetaminophen (Tylenol) if needed    How do I prepare myself?  -  Take two showers: one the night before surgery; and one the morning of surgery.         Use Scrubcare or Hibiclens to wash from neck down.  You may use your own shampoo and conditioner. No other hair products.   -  Do NOT use lotion, powder, colognes, deodorant, or antiperspirant the day of your surgery.  -  Do NOT wear any makeup, fingernail polish or jewelry.    Questions or Concerns:  If you have questions or concerns, please call the  Preoperative Assessment Center, Monday-Friday 7AM-7PM:  [phone]    AFTER YOUR SURGERY  Breathing exercises   Breathing exercises help you recover faster. Take deep breaths and let the air out slowly. This will:     Help you wake up after surgery.    Help prevent complications like pneumonia.  Preventing complications will help you go home sooner.   We may give you a breathing device  (incentive spirometer) to encourage you to breathe deeply.   Nausea and vomiting   You may feel sick to your stomach after surgery; if so, let your nurse know.    Pain control:  After surgery, you may have pain. Our goal is to help you manage your pain. Pain medicine will help you feel comfortable enough to do activities that will help you heal.  These activities may include breathing exercises, walking and physical therapy.   To help your health care team treat your pain we will ask: 1) If you have pain  2) where it is located 3) describe your pain in your words  Methods of pain control include medications given by mouth, vein or by nerve block for some surgeries.  Sequential Compression Device (SCD) or Pneumo Boots:  You may need to wear SCD S on your legs or feet. These are wraps connected to a machine that pumps in air and releases it. The repeated pumping helps prevent blood clots from forming.

## 2017-11-20 NOTE — H&P
Pre-Operative H & P     CC:  Preoperative exam to assess for increased cardiopulmonary risk while undergoing surgery and anesthesia.    Date of Encounter: November 20, 2017   Primary Care Physician:  No Ref-Primary, Physician       JEREL  Shelbi Howard is a 57 year old who presents for pre-operative H & P in preparation for a Total Hysterectomy on 12/4/17 with Dr. Parekh for endometrial cancer.    Ms. Howard does not see a PCP and her last physical was over 5 years ago, so history is obtained from the patient and EMR from a recent Gyn/Onc visit this past October 2017.  She is obese with a BMI of 33, she has intermittent migraines.     She has no CP, STRINGER, orthopnea, palpitations or syncope.  She has a 30 pack year history of smoking but quit about 1 year ago.   She gardens and takes regular walks.    History is obtained from the patient and medical record including Care Everywhere.    Past Medical History  Past Medical History:   Diagnosis Date     ASCUS of cervix with negative high risk HPV 10/17/2017     Endometrial cancer (H)      Obesity          Past Surgical History  Past Surgical History:   Procedure Laterality Date     oopherectomy Left     13 lb benign mass removal         Prior to Admission Medications  Current Outpatient Prescriptions   Medication Sig Dispense Refill     Naproxen Sodium (ALEVE PO) Take 2 tablets by mouth 2 times daily as needed for moderate pain            Allergies  Review of patient's allergies indicates no known allergies.     Social History  Social History     Social History     Marital status: Single     Spouse name: N/A     Number of children: N/A     Years of education: N/A     Occupational History     Not on file.     Social History Main Topics     Smoking status: Former Smoker     Packs/day: 0.50     Years: 38.00     Types: Cigarettes     Quit date: 2/17/2017     Smokeless tobacco: Never Used     Alcohol use Yes      Comment: occasionally      Drug use: No     Sexual activity:  "No     Other Topics Concern     Not on file     Social History Narrative          Family History  Family History   Problem Relation Age of Onset     DIABETES Brother      Influenza/Pneumonia Father 62     KIDNEY DISEASE Sister         ROS  10 point review of systems performed.  All pertinent positives noted, otherwise Negative.      Labs: (personally reviewed):  Lab Results   Component Value Date    WBC 7.9 11/20/2017    HGB 15.9 (H) 11/20/2017    HCT 46.6 11/20/2017     11/20/2017     11/20/2017    POTASSIUM 4.2 11/20/2017    COLTEN 9.2 11/20/2017     (H) 11/20/2017    CR 0.65 11/20/2017    BUN 15 11/20/2017    CO2 27 11/20/2017           Physical Exam:  No LMP recorded. Patient is postmenopausal.   Vital signs:  /87 (BP Location: Left arm, Patient Position: Sitting, Cuff Size: Adult Regular)  Pulse 59  Temp 98  F (36.7  C) (Oral)  Resp 16  Ht 1.6 m (5' 3\")  Wt 85.8 kg (189 lb 1.6 oz)  SpO2 96%  BMI 33.5 kg/m2    Constitutional: Awake, alert, cooperative, no apparent distress, and appears stated age.  Eyes: Pupils equal, round and reactive to light, sclera clear, conjunctiva normal.  HENT: Normocephalic, oral pharynx with moist mucus membranes. No goiter appreciated.   Respiratory: Clear to auscultation bilaterally, no crackles or wheezing.  Cardiovascular: Regular rate and rhythm, normal S1 and S2, and no overt murmur noted.  Carotids +2, no bruits. No edema. Palpable pulses to radial  DP and PT arteries.   GI: Normal bowel sounds, soft, non-distended, non-tender, no masses palpated  Skin: Warm and dry.  No rashes at anticipated surgical site.   Musculoskeletal: Full ROM of neck. There is no redness, warmth, or swelling of the exposed joints. Gross motor strength is normal.    Neurologic: Awake, alert, oriented to name, place and time.  Gait is normal.   Neuropsychiatric: Calm, cooperative. Normal affect.     Assessment/Plan  Shelbi Howard is a 57 year old who presents for " pre-operative H & P in preparation for a Total Hysterectomy on 12/4/17 with Dr. Parekh for endometrial cancer.   PAC referral for risk assessment and optimization for anesthesia with comorbid conditions of:    Pre-operative considerations:  1.  Cardiac:  Functional status METS >4   Risk of Major Adverse Cardiac event: 0.9%  -No known cardiac disease  2.  Pulm:   BRETT risk:  Low  -30 pack year smoking history, quit 2/2017  3.  GI:  Risk of PONV score =3 .  If > 2, anti-emetic intervention recommended.    Patient is optimized and is acceptable candidate for the proposed procedure.  No further diagnostic evaluation is needed.      AVS given to patient regarding medication instructions,  surgery time/arrival time and NPO status.  Angie Brooks MS PA-C   Preoperative Assessment Center  [facility]  [facility] and [facility]  Phone: [phone]  Fax: [phone]

## 2017-11-20 NOTE — LETTER
2017       RE: Shelbi Howard  [id number] [address] [facility] [address]     Dear Colleague,    Thank you for referring your patient, Shelbi Howard, to the [facility]. Please see a copy of my visit note below.                            Consult Notes on Referred Patient    Date: 2017       Dr. Lisa Sandoval, APRN CNM  [address] [place] [address]       RE: Shelbi Howard  : 1960  MARISSA: 2017    Dear Dr. Lisa Sandoval:    I had the pleasure of seeing your patient Shelbi Howard here at the Gynecologic Cancer Clinic at the [facility] on 2017.  As you know she is a very pleasant 57 year old woman with a recent diagnosis of grade 1 endometrial cancer.  Given these findings she was subsequently sent to the Gynecologic Cancer Clinic for new patient consultation.   The patient is G1, P1, 1 prior vaginal delivery.  Menopause at age 38 when she had an enlarged cyst and unilateral salpingo-oophorectomy.  She does not recall which side this was.  She has been in menopause since then.  She did not have any bleeding until she had some postmenopausal bleeding last month and underwent an endometrial biopsy showing grade 1 endometrioid endometrial carcinoma.  The patient has never been on hormone replacement therapy.  There is no change in urinary or bowel habits.  No B symptoms.  Of note, she has an elevated blood pressure over 200 systolic here and is not known to have hypertension.           Past Medical History:    Past Medical History:   Diagnosis Date     ASCUS of cervix with negative high risk HPV 10/17/2017     NO ACTIVE PROBLEMS          Past Surgical History:    Past Surgical History:   Procedure Laterality Date     oopherectomy Left          Health Maintenance:  Health Maintenance Due   Topic Date Due     TETANUS IMMUNIZATION (SYSTEM ASSIGNED)  1976     HEPATITIS C SCREENING  1976     LIPID SCREEN Q5 YR FEMALE (SYSTEM ASSIGNED)  2003  "    MAMMO SCREEN Q2 YR (SYSTEM ASSIGNED)  01/29/2008     COLON CANCER SCREEN (SYSTEM ASSIGNED)  01/29/2008     ADVANCE DIRECTIVE PLANNING Q5 YRS  01/29/2013     INFLUENZA VACCINE (SYSTEM ASSIGNED)  09/01/2017       Negative Pap smears in the past and ASCUS Pap smear with negative high-risk HPV just now.  Never had colonoscopy.  Last mammogram was about 10 years ago.             Current Medications:     has a current medication list which includes the following prescription(s): naproxen sodium.       Allergies:     [unfilled]        Social History:     Social History   Substance Use Topics     Smoking status: Former Smoker     Packs/day: 0.50     Years: 38.00     Types: Cigarettes     Quit date: 2/17/2017     Smokeless tobacco: Never Used      Comment: 1 pack per week. Currently quitting      Alcohol use Yes      Comment: occasionally        History   Drug Use No           Family History:         Family History   Problem Relation Age of Onset     DIABETES Brother      Influenza/Pneumonia Father 62     KIDNEY DISEASE Sister      Paternal aunt had lung cancer and another paternal aunt had throat cancer.  No other cancer in her family.           Physical Exam:     /78  Pulse 61  Temp 97.8  F (36.6  C) (Oral)  Resp 18  Ht 1.601 m (5' 3.03\")  Wt 86.3 kg (190 lb 4.8 oz)  SpO2 95%  BMI 33.68 kg/m2  Body mass index is 33.68 kg/(m^2).    General Appearance: healthy and alert, no distress     ABDOMEN:  Soft, nontender.  The patient has about a 15 cm midline hernia on prior incision site which is asymptomatic.  There is no organomegaly.    PELVIC:  Normal external genitalia, normal vaginal mucosa, normal-appear cervix.  She has a grade 2 cystocele, grade 2 rectocele.  No adnexal masses or tenderness.  Rectovaginal confirms.           Assessment:    Shelbi Howard is a 57 year old woman with a new diagnosis of grade 1 endometrial cancer.     A total of 60 minutes was spent with the patient, 40 minutes of which were " spent in counseling the patient and/or treatment planning.      1.  Grade 1 endometrioid endometrial carcinoma.   2.  Significantly elevated blood pressure.   3.  Class 1 obesity.      Discussed with the patient treatment of endometrial carcinoma is surgically and would recommend a robotic hysterectomy and bilateral salpingo-oophorectomy with possible pelvic and periaortic lymphadenectomy, debulking and possible hernia repair, given her hernia, which hopefully we can avoid.  She will see my colleagues in Anesthesia for preoperative clearance, given her newly significantly elevated blood pressure and most likely underlying hypertension.  Of note, there may have been some technical problems with our automated system in the last week.  We will have it rechecked in the anesthesia clinic and proceed from there.  We have scheduled tentatively on 12/05/2017.  Patient agrees with the plan, is very appreciative of her care.  All questions were answered.       Risks, benefits and alternatives to proceed discussed in detail with the patient. Risks include but are not limited to bleeding, infection, possible injury to surrounding organs including bowel, bladder, ureter, need for second procedure/surgery related to complications from first procedure, postoperative medical complications such as cardiopulmonary events, lymphedema, lymphocyst, thromboembolic events. Consent for surgery, blood transfusion signed.  Will arrange appropriate preoperative blood work, CXR, EKG. Patient also advised on need for postoperative surveillance and/or adjuvant therapy. Questions answered.          Thank you for allowing us to participate in the care of your patient.         Sincerely,    Facundo Parekh MD, MS    Department of Obstetrics and Gynecology   Division of Gynecologic Oncology   [facility]  Phone: [phone]    CC  Patient Care Team:  No Ref-Primary, Physician as PCP - General  LOVE ATKINSON  D

## 2017-11-20 NOTE — PROGRESS NOTES
Consult Notes on Referred Patient    Date: 2017       Dr. Lisa Sandoval, APRN CNM  [address] [place] [address]       RE: Shelbi Howard  : 1960  MARISSA: 2017    Dear Dr. Lisa Sandoval:    I had the pleasure of seeing your patient Shelbi Howard here at the Gynecologic Cancer Clinic at the [facility] on 2017.  As you know she is a very pleasant 57 year old woman with a recent diagnosis of grade 1 endometrial cancer.  Given these findings she was subsequently sent to the Gynecologic Cancer Clinic for new patient consultation.   The patient is G1, P1, 1 prior vaginal delivery.  Menopause at age 38 when she had an enlarged cyst and unilateral salpingo-oophorectomy.  She does not recall which side this was.  She has been in menopause since then.  She did not have any bleeding until she had some postmenopausal bleeding last month and underwent an endometrial biopsy showing grade 1 endometrioid endometrial carcinoma.  The patient has never been on hormone replacement therapy.  There is no change in urinary or bowel habits.  No B symptoms.  Of note, she has an elevated blood pressure over 200 systolic here and is not known to have hypertension.           Past Medical History:    Past Medical History:   Diagnosis Date     ASCUS of cervix with negative high risk HPV 10/17/2017     NO ACTIVE PROBLEMS          Past Surgical History:    Past Surgical History:   Procedure Laterality Date     oopherectomy Left          Health Maintenance:  Health Maintenance Due   Topic Date Due     TETANUS IMMUNIZATION (SYSTEM ASSIGNED)  1976     HEPATITIS C SCREENING  1976     LIPID SCREEN Q5 YR FEMALE (SYSTEM ASSIGNED)  2003     MAMMO SCREEN Q2 YR (SYSTEM ASSIGNED)  2008     COLON CANCER SCREEN (SYSTEM ASSIGNED)  2008     ADVANCE DIRECTIVE PLANNING Q5 YRS  2013     INFLUENZA VACCINE (SYSTEM ASSIGNED)  2017       Negative Pap smears  "in the past and ASCUS Pap smear with negative high-risk HPV just now.  Never had colonoscopy.  Last mammogram was about 10 years ago.             Current Medications:     has a current medication list which includes the following prescription(s): naproxen sodium.       Allergies:     [unfilled]        Social History:     Social History   Substance Use Topics     Smoking status: Former Smoker     Packs/day: 0.50     Years: 38.00     Types: Cigarettes     Quit date: 2/17/2017     Smokeless tobacco: Never Used      Comment: 1 pack per week. Currently quitting      Alcohol use Yes      Comment: occasionally        History   Drug Use No           Family History:         Family History   Problem Relation Age of Onset     DIABETES Brother      Influenza/Pneumonia Father 62     KIDNEY DISEASE Sister      Paternal aunt had lung cancer and another paternal aunt had throat cancer.  No other cancer in her family.           Physical Exam:     /78  Pulse 61  Temp 97.8  F (36.6  C) (Oral)  Resp 18  Ht 1.601 m (5' 3.03\")  Wt 86.3 kg (190 lb 4.8 oz)  SpO2 95%  BMI 33.68 kg/m2  Body mass index is 33.68 kg/(m^2).    General Appearance: healthy and alert, no distress     ABDOMEN:  Soft, nontender.  The patient has about a 15 cm midline hernia on prior incision site which is asymptomatic.  There is no organomegaly.    PELVIC:  Normal external genitalia, normal vaginal mucosa, normal-appear cervix.  She has a grade 2 cystocele, grade 2 rectocele.  No adnexal masses or tenderness.  Rectovaginal confirms.           Assessment:    Shelbi Howard is a 57 year old woman with a new diagnosis of grade 1 endometrial cancer.     A total of 60 minutes was spent with the patient, 40 minutes of which were spent in counseling the patient and/or treatment planning.      1.  Grade 1 endometrioid endometrial carcinoma.   2.  Significantly elevated blood pressure.   3.  Class 1 obesity.      Discussed with the patient treatment of " endometrial carcinoma is surgically and would recommend a robotic hysterectomy and bilateral salpingo-oophorectomy with possible pelvic and periaortic lymphadenectomy, debulking and possible hernia repair, given her hernia, which hopefully we can avoid.  She will see my colleagues in Anesthesia for preoperative clearance, given her newly significantly elevated blood pressure and most likely underlying hypertension.  Of note, there may have been some technical problems with our automated system in the last week.  We will have it rechecked in the anesthesia clinic and proceed from there.  We have scheduled tentatively on 12/05/2017.  Patient agrees with the plan, is very appreciative of her care.  All questions were answered.       Risks, benefits and alternatives to proceed discussed in detail with the patient. Risks include but are not limited to bleeding, infection, possible injury to surrounding organs including bowel, bladder, ureter, need for second procedure/surgery related to complications from first procedure, postoperative medical complications such as cardiopulmonary events, lymphedema, lymphocyst, thromboembolic events. Consent for surgery, blood transfusion signed.  Will arrange appropriate preoperative blood work, CXR, EKG. Patient also advised on need for postoperative surveillance and/or adjuvant therapy. Questions answered.          Thank you for allowing us to participate in the care of your patient.         Sincerely,    Facundo Parekh MD, MS    Department of Obstetrics and Gynecology   Division of Gynecologic Oncology   [facility]  Phone: [phone]    CC  Patient Care Team:  No Ref-Primary, Physician as PCP - General  LOVE ATKINSON

## 2017-11-21 NOTE — NURSING NOTE
Pre Op Nurse Teaching Template    Relevant Diagnosis: Endometrial Cancer     Teaching Topic: Robotic assisted hysterectomy, BSO, possible lymph node dissection, possible open, possible debulking, cystoscopy     Person(s) involved in teaching :  Patient  Alone   Motivation Level:  Asks Questions:    Yes      Eager to Learn:     Yes     Cooperative:          Yes    Receptive (willing. Able to accept information):    Yes      Patient and those who are listed above demonstrates understanding of the following:   Reason for the appointment, diagnosis and treatment plan:   Yes   Knowledge of proper use of medications and conditions for which they are ordered (with special attention to potential side effects or drug interactions): Yes   Which situations necessitate calling provider and whom to contact: Yes         Nutritional needs and diet plan:  Yes      Pain management techniques:     Yes, Pain Scale   Diet:   Yes, [facility] Diet Instructions    Teaching Concerns addressed: Yes    Infection Prevention:  Patient and those who are listed above demonstrate understanding of the following:  Pre-Op CHG Bathing Instructions: Yes  Surgical procedure site care taught:   Yes   Signs and symptoms of infection taught: Yes       Instructional Materials Used/Given:  The [facility] Before You Surgery Booklet  Showering or Bathing before Surgery Instructions   Hysterectomy Guidelines  Pain Assessment Tool   Home Care after Major Abdominal or Vaginal Surgery  Map  Accommodations Brochure  Phone numbers for [facility] and [facility]  Copy of Surgical Consent    Comments:  ISMAEL Mayorga RN

## 2017-11-24 ENCOUNTER — RADIANT APPOINTMENT (OUTPATIENT)
Dept: ULTRASOUND IMAGING | Facility: OTHER | Age: 57
End: 2017-11-24
Attending: ADVANCED PRACTICE MIDWIFE
Payer: COMMERCIAL

## 2017-11-24 DIAGNOSIS — N95.0 POST-MENOPAUSAL BLEEDING: ICD-10-CM

## 2017-11-24 PROCEDURE — 76830 TRANSVAGINAL US NON-OB: CPT

## 2017-11-24 PROCEDURE — 76856 US EXAM PELVIC COMPLETE: CPT

## 2017-11-27 ENCOUNTER — MYC MEDICAL ADVICE (OUTPATIENT)
Dept: OBGYN | Facility: OTHER | Age: 57
End: 2017-11-27

## 2017-12-04 ENCOUNTER — ANESTHESIA EVENT (OUTPATIENT)
Dept: SURGERY | Facility: CLINIC | Age: 57
End: 2017-12-04
Payer: COMMERCIAL

## 2017-12-05 ENCOUNTER — HOSPITAL ENCOUNTER (OUTPATIENT)
Facility: CLINIC | Age: 57
Discharge: HOME | End: 2017-12-06
Attending: OBSTETRICS & GYNECOLOGY | Admitting: OBSTETRICS & GYNECOLOGY
Payer: COMMERCIAL

## 2017-12-05 ENCOUNTER — ANESTHESIA (OUTPATIENT)
Dept: SURGERY | Facility: CLINIC | Age: 57
End: 2017-12-05
Payer: COMMERCIAL

## 2017-12-05 ENCOUNTER — SURGERY (OUTPATIENT)
Age: 57
End: 2017-12-05
Payer: COMMERCIAL

## 2017-12-05 DIAGNOSIS — C54.1 ENDOMETRIAL CANCER (H): ICD-10-CM

## 2017-12-05 DIAGNOSIS — C19 COLORECTAL CANCER (H): ICD-10-CM

## 2017-12-05 DIAGNOSIS — Z90.710 S/P HYSTERECTOMY: Primary | ICD-10-CM

## 2017-12-05 DIAGNOSIS — E11.9 TYPE 2 DIABETES MELLITUS WITHOUT COMPLICATION, UNSPECIFIED LONG TERM INSULIN USE STATUS: ICD-10-CM

## 2017-12-05 LAB
ABO + RH BLD: NORMAL
ABO + RH BLD: NORMAL
B-HCG SERPL-ACNC: 4 IU/L (ref 0–5)
BLD GP AB SCN SERPL QL: NORMAL
BLOOD BANK CMNT PATIENT-IMP: NORMAL
BLOOD BANK CMNT PATIENT-IMP: NORMAL
GLUCOSE BLDC GLUCOMTR-MCNC: 180 MG/DL (ref 70–99)
GLUCOSE BLDC GLUCOMTR-MCNC: 191 MG/DL (ref 70–99)
GLUCOSE BLDC GLUCOMTR-MCNC: 221 MG/DL (ref 70–99)
GLUCOSE BLDC GLUCOMTR-MCNC: 224 MG/DL (ref 70–99)
GLUCOSE BLDC GLUCOMTR-MCNC: 247 MG/DL (ref 70–99)
GLUCOSE BLDC GLUCOMTR-MCNC: 255 MG/DL (ref 70–99)
GLUCOSE BLDC GLUCOMTR-MCNC: 269 MG/DL (ref 70–99)
GLUCOSE BLDC GLUCOMTR-MCNC: 272 MG/DL (ref 70–99)
GLUCOSE BLDC GLUCOMTR-MCNC: 275 MG/DL (ref 70–99)
GLUCOSE BLDC GLUCOMTR-MCNC: 283 MG/DL (ref 70–99)
HBA1C MFR BLD: 9 % (ref 4.3–6)
SPECIMEN EXP DATE BLD: NORMAL

## 2017-12-05 PROCEDURE — C9399 UNCLASSIFIED DRUGS OR BIOLOG: HCPCS | Performed by: NURSE ANESTHETIST, CERTIFIED REGISTERED

## 2017-12-05 PROCEDURE — 58571 TLH W/T/O 250 G OR LESS: CPT | Mod: GC | Performed by: OBSTETRICS & GYNECOLOGY

## 2017-12-05 PROCEDURE — 25000128 H RX IP 250 OP 636: Performed by: RESIDENTIAL TREATMENT FACILITY, PHYSICAL DISABILITIES

## 2017-12-05 PROCEDURE — 25000125 ZZHC RX 250: Performed by: NURSE ANESTHETIST, CERTIFIED REGISTERED

## 2017-12-05 PROCEDURE — 27210794 ZZH OR GENERAL SUPPLY STERILE: Performed by: OBSTETRICS & GYNECOLOGY

## 2017-12-05 PROCEDURE — 36415 COLL VENOUS BLD VENIPUNCTURE: CPT | Performed by: OBSTETRICS & GYNECOLOGY

## 2017-12-05 PROCEDURE — 00000159 ZZHCL STATISTIC H-SEND OUTS PREP: Performed by: OBSTETRICS & GYNECOLOGY

## 2017-12-05 PROCEDURE — 81288 MLH1 GENE: CPT | Performed by: OBSTETRICS & GYNECOLOGY

## 2017-12-05 PROCEDURE — 88331 PATH CONSLTJ SURG 1 BLK 1SPC: CPT | Performed by: OBSTETRICS & GYNECOLOGY

## 2017-12-05 PROCEDURE — 84702 CHORIONIC GONADOTROPIN TEST: CPT | Performed by: OBSTETRICS & GYNECOLOGY

## 2017-12-05 PROCEDURE — 25000125 ZZHC RX 250: Performed by: OBSTETRICS & GYNECOLOGY

## 2017-12-05 PROCEDURE — 00000155 ZZHCL STATISTIC H-CELL BLOCK W/STAIN: Performed by: OBSTETRICS & GYNECOLOGY

## 2017-12-05 PROCEDURE — 88341 IMHCHEM/IMCYTCHM EA ADD ANTB: CPT | Performed by: OBSTETRICS & GYNECOLOGY

## 2017-12-05 PROCEDURE — 99207 ZZC CONSULT E&M CHANGED TO INITIAL LEVEL: CPT | Performed by: PHYSICIAN ASSISTANT

## 2017-12-05 PROCEDURE — 82962 GLUCOSE BLOOD TEST: CPT | Mod: 91

## 2017-12-05 PROCEDURE — 71000014 ZZH RECOVERY PHASE 1 LEVEL 2 FIRST HR: Performed by: OBSTETRICS & GYNECOLOGY

## 2017-12-05 PROCEDURE — 71000015 ZZH RECOVERY PHASE 1 LEVEL 2 EA ADDTL HR: Performed by: OBSTETRICS & GYNECOLOGY

## 2017-12-05 PROCEDURE — 83036 HEMOGLOBIN GLYCOSYLATED A1C: CPT | Performed by: OBSTETRICS & GYNECOLOGY

## 2017-12-05 PROCEDURE — 88342 IMHCHEM/IMCYTCHM 1ST ANTB: CPT | Performed by: OBSTETRICS & GYNECOLOGY

## 2017-12-05 PROCEDURE — 40000170 ZZH STATISTIC PRE-PROCEDURE ASSESSMENT II: Performed by: OBSTETRICS & GYNECOLOGY

## 2017-12-05 PROCEDURE — 88305 TISSUE EXAM BY PATHOLOGIST: CPT | Performed by: OBSTETRICS & GYNECOLOGY

## 2017-12-05 PROCEDURE — 27211024 ZZHC OR SUPPLY OTHER OPNP: Performed by: OBSTETRICS & GYNECOLOGY

## 2017-12-05 PROCEDURE — 36000088 ZZH SURGERY LEVEL 8 EA 15 ADDTL MIN - UMMC: Performed by: OBSTETRICS & GYNECOLOGY

## 2017-12-05 PROCEDURE — 25000128 H RX IP 250 OP 636: Performed by: NURSE ANESTHETIST, CERTIFIED REGISTERED

## 2017-12-05 PROCEDURE — 25000128 H RX IP 250 OP 636: Performed by: OBSTETRICS & GYNECOLOGY

## 2017-12-05 PROCEDURE — 88112 CYTOPATH CELL ENHANCE TECH: CPT | Performed by: OBSTETRICS & GYNECOLOGY

## 2017-12-05 PROCEDURE — 25000125 ZZHC RX 250: Performed by: RESIDENTIAL TREATMENT FACILITY, PHYSICAL DISABILITIES

## 2017-12-05 PROCEDURE — 37000008 ZZH ANESTHESIA TECHNICAL FEE, 1ST 30 MIN: Performed by: OBSTETRICS & GYNECOLOGY

## 2017-12-05 PROCEDURE — 37000009 ZZH ANESTHESIA TECHNICAL FEE, EACH ADDTL 15 MIN: Performed by: OBSTETRICS & GYNECOLOGY

## 2017-12-05 PROCEDURE — 25000566 ZZH SEVOFLURANE, EA 15 MIN: Performed by: OBSTETRICS & GYNECOLOGY

## 2017-12-05 PROCEDURE — 99205 OFFICE O/P NEW HI 60 MIN: CPT | Performed by: PHYSICIAN ASSISTANT

## 2017-12-05 PROCEDURE — 88309 TISSUE EXAM BY PATHOLOGIST: CPT | Performed by: OBSTETRICS & GYNECOLOGY

## 2017-12-05 PROCEDURE — 36000086 ZZH SURGERY LEVEL 8 1ST 30 MIN UMMC: Performed by: OBSTETRICS & GYNECOLOGY

## 2017-12-05 PROCEDURE — P9041 ALBUMIN (HUMAN),5%, 50ML: HCPCS | Performed by: NURSE ANESTHETIST, CERTIFIED REGISTERED

## 2017-12-05 RX ORDER — NICOTINE POLACRILEX 4 MG
15-30 LOZENGE BUCCAL
Status: DISCONTINUED | OUTPATIENT
Start: 2017-12-05 | End: 2017-12-05

## 2017-12-05 RX ORDER — ONDANSETRON 4 MG/1
4 TABLET, ORALLY DISINTEGRATING ORAL EVERY 6 HOURS PRN
Status: DISCONTINUED | OUTPATIENT
Start: 2017-12-05 | End: 2017-12-06 | Stop reason: HOSPADM

## 2017-12-05 RX ORDER — ONDANSETRON 2 MG/ML
4 INJECTION INTRAMUSCULAR; INTRAVENOUS EVERY 30 MIN PRN
Status: DISCONTINUED | OUTPATIENT
Start: 2017-12-05 | End: 2017-12-05 | Stop reason: HOSPADM

## 2017-12-05 RX ORDER — METOPROLOL TARTRATE 1 MG/ML
1-2 INJECTION, SOLUTION INTRAVENOUS EVERY 5 MIN PRN
Status: DISCONTINUED | OUTPATIENT
Start: 2017-12-05 | End: 2017-12-05 | Stop reason: HOSPADM

## 2017-12-05 RX ORDER — DEXTROSE MONOHYDRATE 25 G/50ML
25-50 INJECTION, SOLUTION INTRAVENOUS
Status: DISCONTINUED | OUTPATIENT
Start: 2017-12-05 | End: 2017-12-05

## 2017-12-05 RX ORDER — EPHEDRINE SULFATE 50 MG/ML
INJECTION, SOLUTION INTRAMUSCULAR; INTRAVENOUS; SUBCUTANEOUS PRN
Status: DISCONTINUED | OUTPATIENT
Start: 2017-12-05 | End: 2017-12-05

## 2017-12-05 RX ORDER — HYDRALAZINE HYDROCHLORIDE 20 MG/ML
10 INJECTION INTRAMUSCULAR; INTRAVENOUS EVERY 6 HOURS PRN
Status: DISCONTINUED | OUTPATIENT
Start: 2017-12-05 | End: 2017-12-05

## 2017-12-05 RX ORDER — HYDRALAZINE HYDROCHLORIDE 20 MG/ML
INJECTION INTRAMUSCULAR; INTRAVENOUS PRN
Status: DISCONTINUED | OUTPATIENT
Start: 2017-12-05 | End: 2017-12-05

## 2017-12-05 RX ORDER — FENTANYL CITRATE 50 UG/ML
25-50 INJECTION, SOLUTION INTRAMUSCULAR; INTRAVENOUS EVERY 5 MIN PRN
Status: DISCONTINUED | OUTPATIENT
Start: 2017-12-05 | End: 2017-12-05 | Stop reason: HOSPADM

## 2017-12-05 RX ORDER — CEFAZOLIN SODIUM 2 G/100ML
2 INJECTION, SOLUTION INTRAVENOUS
Status: COMPLETED | OUTPATIENT
Start: 2017-12-05 | End: 2017-12-05

## 2017-12-05 RX ORDER — IBUPROFEN 200 MG
600 TABLET ORAL EVERY 6 HOURS PRN
Status: DISCONTINUED | OUTPATIENT
Start: 2017-12-05 | End: 2017-12-06 | Stop reason: HOSPADM

## 2017-12-05 RX ORDER — FENTANYL CITRATE 50 UG/ML
INJECTION, SOLUTION INTRAMUSCULAR; INTRAVENOUS PRN
Status: DISCONTINUED | OUTPATIENT
Start: 2017-12-05 | End: 2017-12-05

## 2017-12-05 RX ORDER — LIDOCAINE HYDROCHLORIDE 20 MG/ML
INJECTION, SOLUTION INFILTRATION; PERINEURAL PRN
Status: DISCONTINUED | OUTPATIENT
Start: 2017-12-05 | End: 2017-12-05

## 2017-12-05 RX ORDER — HEPARIN SODIUM 5000 [USP'U]/.5ML
INJECTION, SOLUTION INTRAVENOUS; SUBCUTANEOUS PRN
Status: DISCONTINUED | OUTPATIENT
Start: 2017-12-05 | End: 2017-12-05

## 2017-12-05 RX ORDER — ALBUMIN, HUMAN INJ 5% 5 %
SOLUTION INTRAVENOUS CONTINUOUS PRN
Status: DISCONTINUED | OUTPATIENT
Start: 2017-12-05 | End: 2017-12-05

## 2017-12-05 RX ORDER — OXYCODONE AND ACETAMINOPHEN 5; 325 MG/1; MG/1
1-2 TABLET ORAL EVERY 4 HOURS PRN
Qty: 30 TABLET | Refills: 0 | Status: SHIPPED | OUTPATIENT
Start: 2017-12-05 | End: 2017-12-06

## 2017-12-05 RX ORDER — DEXAMETHASONE SODIUM PHOSPHATE 4 MG/ML
INJECTION, SOLUTION INTRA-ARTICULAR; INTRALESIONAL; INTRAMUSCULAR; INTRAVENOUS; SOFT TISSUE PRN
Status: DISCONTINUED | OUTPATIENT
Start: 2017-12-05 | End: 2017-12-05

## 2017-12-05 RX ORDER — NICOTINE POLACRILEX 4 MG
15-30 LOZENGE BUCCAL
Status: DISCONTINUED | OUTPATIENT
Start: 2017-12-05 | End: 2017-12-06 | Stop reason: HOSPADM

## 2017-12-05 RX ORDER — PROPOFOL 10 MG/ML
INJECTION, EMULSION INTRAVENOUS PRN
Status: DISCONTINUED | OUTPATIENT
Start: 2017-12-05 | End: 2017-12-05

## 2017-12-05 RX ORDER — ONDANSETRON 2 MG/ML
4 INJECTION INTRAMUSCULAR; INTRAVENOUS EVERY 6 HOURS PRN
Status: DISCONTINUED | OUTPATIENT
Start: 2017-12-05 | End: 2017-12-06 | Stop reason: HOSPADM

## 2017-12-05 RX ORDER — LABETALOL HYDROCHLORIDE 5 MG/ML
INJECTION, SOLUTION INTRAVENOUS PRN
Status: DISCONTINUED | OUTPATIENT
Start: 2017-12-05 | End: 2017-12-05

## 2017-12-05 RX ORDER — FENTANYL CITRATE 50 UG/ML
25-50 INJECTION, SOLUTION INTRAMUSCULAR; INTRAVENOUS
Status: DISCONTINUED | OUTPATIENT
Start: 2017-12-05 | End: 2017-12-05 | Stop reason: HOSPADM

## 2017-12-05 RX ORDER — SODIUM CHLORIDE, SODIUM LACTATE, POTASSIUM CHLORIDE, CALCIUM CHLORIDE 600; 310; 30; 20 MG/100ML; MG/100ML; MG/100ML; MG/100ML
INJECTION, SOLUTION INTRAVENOUS CONTINUOUS PRN
Status: DISCONTINUED | OUTPATIENT
Start: 2017-12-05 | End: 2017-12-05

## 2017-12-05 RX ORDER — MEPERIDINE HYDROCHLORIDE 25 MG/ML
12.5 INJECTION INTRAMUSCULAR; INTRAVENOUS; SUBCUTANEOUS
Status: DISCONTINUED | OUTPATIENT
Start: 2017-12-05 | End: 2017-12-05 | Stop reason: HOSPADM

## 2017-12-05 RX ORDER — OXYCODONE AND ACETAMINOPHEN 5; 325 MG/1; MG/1
1-2 TABLET ORAL EVERY 4 HOURS PRN
Status: DISCONTINUED | OUTPATIENT
Start: 2017-12-05 | End: 2017-12-06 | Stop reason: HOSPADM

## 2017-12-05 RX ORDER — ONDANSETRON 2 MG/ML
INJECTION INTRAMUSCULAR; INTRAVENOUS PRN
Status: DISCONTINUED | OUTPATIENT
Start: 2017-12-05 | End: 2017-12-05

## 2017-12-05 RX ORDER — AMOXICILLIN 250 MG
1-2 CAPSULE ORAL 2 TIMES DAILY
Qty: 30 TABLET | Refills: 0 | Status: SHIPPED | OUTPATIENT
Start: 2017-12-05 | End: 2017-12-06

## 2017-12-05 RX ORDER — ONDANSETRON 4 MG/1
4 TABLET, ORALLY DISINTEGRATING ORAL EVERY 30 MIN PRN
Status: DISCONTINUED | OUTPATIENT
Start: 2017-12-05 | End: 2017-12-05 | Stop reason: HOSPADM

## 2017-12-05 RX ORDER — NALOXONE HYDROCHLORIDE 0.4 MG/ML
.1-.4 INJECTION, SOLUTION INTRAMUSCULAR; INTRAVENOUS; SUBCUTANEOUS
Status: DISCONTINUED | OUTPATIENT
Start: 2017-12-05 | End: 2017-12-06 | Stop reason: HOSPADM

## 2017-12-05 RX ORDER — NALOXONE HYDROCHLORIDE 0.4 MG/ML
.1-.4 INJECTION, SOLUTION INTRAMUSCULAR; INTRAVENOUS; SUBCUTANEOUS
Status: DISCONTINUED | OUTPATIENT
Start: 2017-12-05 | End: 2017-12-05 | Stop reason: HOSPADM

## 2017-12-05 RX ORDER — CEFAZOLIN SODIUM 1 G/3ML
1 INJECTION, POWDER, FOR SOLUTION INTRAMUSCULAR; INTRAVENOUS SEE ADMIN INSTRUCTIONS
Status: DISCONTINUED | OUTPATIENT
Start: 2017-12-05 | End: 2017-12-05 | Stop reason: HOSPADM

## 2017-12-05 RX ORDER — HYDRALAZINE HYDROCHLORIDE 20 MG/ML
10 INJECTION INTRAMUSCULAR; INTRAVENOUS EVERY 6 HOURS PRN
Status: DISCONTINUED | OUTPATIENT
Start: 2017-12-05 | End: 2017-12-06 | Stop reason: HOSPADM

## 2017-12-05 RX ORDER — IBUPROFEN 600 MG/1
600 TABLET, FILM COATED ORAL EVERY 6 HOURS PRN
Qty: 30 TABLET | Refills: 0 | Status: SHIPPED | OUTPATIENT
Start: 2017-12-05 | End: 2017-12-06

## 2017-12-05 RX ORDER — INDOCYANINE GREEN AND WATER 25 MG
KIT INJECTION PRN
Status: DISCONTINUED | OUTPATIENT
Start: 2017-12-05 | End: 2017-12-05

## 2017-12-05 RX ORDER — SODIUM CHLORIDE, SODIUM LACTATE, POTASSIUM CHLORIDE, CALCIUM CHLORIDE 600; 310; 30; 20 MG/100ML; MG/100ML; MG/100ML; MG/100ML
INJECTION, SOLUTION INTRAVENOUS CONTINUOUS
Status: ACTIVE | OUTPATIENT
Start: 2017-12-05 | End: 2017-12-05

## 2017-12-05 RX ORDER — HYDROMORPHONE HYDROCHLORIDE 1 MG/ML
.3-.5 INJECTION, SOLUTION INTRAMUSCULAR; INTRAVENOUS; SUBCUTANEOUS EVERY 10 MIN PRN
Status: DISCONTINUED | OUTPATIENT
Start: 2017-12-05 | End: 2017-12-05 | Stop reason: HOSPADM

## 2017-12-05 RX ORDER — MORPHINE SULFATE 2 MG/ML
1 INJECTION, SOLUTION INTRAMUSCULAR; INTRAVENOUS
Status: DISCONTINUED | OUTPATIENT
Start: 2017-12-05 | End: 2017-12-06 | Stop reason: HOSPADM

## 2017-12-05 RX ORDER — SODIUM CHLORIDE, SODIUM LACTATE, POTASSIUM CHLORIDE, CALCIUM CHLORIDE 600; 310; 30; 20 MG/100ML; MG/100ML; MG/100ML; MG/100ML
INJECTION, SOLUTION INTRAVENOUS CONTINUOUS
Status: DISCONTINUED | OUTPATIENT
Start: 2017-12-05 | End: 2017-12-05 | Stop reason: HOSPADM

## 2017-12-05 RX ORDER — DEXTROSE MONOHYDRATE 25 G/50ML
25-50 INJECTION, SOLUTION INTRAVENOUS
Status: DISCONTINUED | OUTPATIENT
Start: 2017-12-05 | End: 2017-12-06 | Stop reason: HOSPADM

## 2017-12-05 RX ADMIN — Medication 10 MG: at 09:22

## 2017-12-05 RX ADMIN — FENTANYL CITRATE 25 MCG: 50 INJECTION, SOLUTION INTRAMUSCULAR; INTRAVENOUS at 12:58

## 2017-12-05 RX ADMIN — HUMAN INSULIN 3.5 UNITS/HR: 100 INJECTION, SOLUTION SUBCUTANEOUS at 08:51

## 2017-12-05 RX ADMIN — PROPOFOL 150 MG: 10 INJECTION, EMULSION INTRAVENOUS at 09:12

## 2017-12-05 RX ADMIN — ALBUMIN HUMAN: 0.05 INJECTION, SOLUTION INTRAVENOUS at 12:00

## 2017-12-05 RX ADMIN — MIDAZOLAM HYDROCHLORIDE 2 MG: 1 INJECTION, SOLUTION INTRAMUSCULAR; INTRAVENOUS at 09:01

## 2017-12-05 RX ADMIN — HEPARIN SODIUM 5000 UNITS: 10000 INJECTION, SOLUTION INTRAVENOUS; SUBCUTANEOUS at 09:28

## 2017-12-05 RX ADMIN — SODIUM CHLORIDE, POTASSIUM CHLORIDE, SODIUM LACTATE AND CALCIUM CHLORIDE: 600; 310; 30; 20 INJECTION, SOLUTION INTRAVENOUS at 09:08

## 2017-12-05 RX ADMIN — FENTANYL CITRATE 50 MCG: 50 INJECTION, SOLUTION INTRAMUSCULAR; INTRAVENOUS at 09:40

## 2017-12-05 RX ADMIN — INDOCYANINE GREEN 5 MG: KIT INTRAVENOUS at 10:00

## 2017-12-05 RX ADMIN — ROCURONIUM BROMIDE 10 MG: 10 INJECTION INTRAVENOUS at 09:40

## 2017-12-05 RX ADMIN — LABETALOL HYDROCHLORIDE 2.5 MG: 5 INJECTION, SOLUTION INTRAVENOUS at 10:30

## 2017-12-05 RX ADMIN — CEFAZOLIN SODIUM 1 G: 2 INJECTION, SOLUTION INTRAVENOUS at 11:52

## 2017-12-05 RX ADMIN — ROCURONIUM BROMIDE 20 MG: 10 INJECTION INTRAVENOUS at 10:57

## 2017-12-05 RX ADMIN — FENTANYL CITRATE 25 MCG: 50 INJECTION, SOLUTION INTRAMUSCULAR; INTRAVENOUS at 12:49

## 2017-12-05 RX ADMIN — ROCURONIUM BROMIDE 20 MG: 10 INJECTION INTRAVENOUS at 11:48

## 2017-12-05 RX ADMIN — SUGAMMADEX 200 MG: 100 INJECTION, SOLUTION INTRAVENOUS at 12:21

## 2017-12-05 RX ADMIN — SODIUM CHLORIDE, POTASSIUM CHLORIDE, SODIUM LACTATE AND CALCIUM CHLORIDE: 600; 310; 30; 20 INJECTION, SOLUTION INTRAVENOUS at 13:38

## 2017-12-05 RX ADMIN — FENTANYL CITRATE 50 MCG: 50 INJECTION, SOLUTION INTRAMUSCULAR; INTRAVENOUS at 13:11

## 2017-12-05 RX ADMIN — HYDRALAZINE HYDROCHLORIDE 5 MG: 20 INJECTION INTRAMUSCULAR; INTRAVENOUS at 11:06

## 2017-12-05 RX ADMIN — HYDRALAZINE HYDROCHLORIDE 5 MG: 20 INJECTION INTRAMUSCULAR; INTRAVENOUS at 09:58

## 2017-12-05 RX ADMIN — ROCURONIUM BROMIDE 40 MG: 10 INJECTION INTRAVENOUS at 09:13

## 2017-12-05 RX ADMIN — CEFAZOLIN SODIUM 2 G: 2 INJECTION, SOLUTION INTRAVENOUS at 09:27

## 2017-12-05 RX ADMIN — LABETALOL HYDROCHLORIDE 5 MG: 5 INJECTION, SOLUTION INTRAVENOUS at 09:45

## 2017-12-05 RX ADMIN — FENTANYL CITRATE 50 MCG: 50 INJECTION, SOLUTION INTRAMUSCULAR; INTRAVENOUS at 10:30

## 2017-12-05 RX ADMIN — ONDANSETRON 4 MG: 2 INJECTION INTRAMUSCULAR; INTRAVENOUS at 09:24

## 2017-12-05 RX ADMIN — FENTANYL CITRATE 50 MCG: 50 INJECTION, SOLUTION INTRAMUSCULAR; INTRAVENOUS at 12:22

## 2017-12-05 RX ADMIN — ROCURONIUM BROMIDE 30 MG: 10 INJECTION INTRAVENOUS at 10:12

## 2017-12-05 RX ADMIN — SODIUM CHLORIDE, POTASSIUM CHLORIDE, SODIUM LACTATE AND CALCIUM CHLORIDE: 600; 310; 30; 20 INJECTION, SOLUTION INTRAVENOUS at 10:38

## 2017-12-05 RX ADMIN — Medication 0.3 MG: at 13:34

## 2017-12-05 RX ADMIN — FENTANYL CITRATE 50 MCG: 50 INJECTION, SOLUTION INTRAMUSCULAR; INTRAVENOUS at 09:49

## 2017-12-05 RX ADMIN — FENTANYL CITRATE 100 MCG: 50 INJECTION, SOLUTION INTRAMUSCULAR; INTRAVENOUS at 09:11

## 2017-12-05 RX ADMIN — LIDOCAINE HYDROCHLORIDE 80 MG: 20 INJECTION, SOLUTION INFILTRATION; PERINEURAL at 09:12

## 2017-12-05 RX ADMIN — Medication 0.2 MG: at 14:05

## 2017-12-05 RX ADMIN — METOPROLOL TARTRATE 1 MG: 5 INJECTION INTRAVENOUS at 14:02

## 2017-12-05 RX ADMIN — DEXAMETHASONE SODIUM PHOSPHATE 4 MG: 4 INJECTION, SOLUTION INTRA-ARTICULAR; INTRALESIONAL; INTRAMUSCULAR; INTRAVENOUS; SOFT TISSUE at 09:17

## 2017-12-05 RX ADMIN — HYDRALAZINE HYDROCHLORIDE 5 MG: 20 INJECTION INTRAMUSCULAR; INTRAVENOUS at 10:05

## 2017-12-05 NOTE — ANESTHESIA CARE TRANSFER NOTE
Patient: Shelbi Castanotto    Procedure(s):  DaVinci Assisted Total Laparoscopic Hysterectomy, Right Salpingo-Oophorectomy, Lysis of Adhesions, Cystoscopy - Wound Class: II-Clean Contaminated   - Wound Class: II-Clean Contaminated    Diagnosis: Endometrial Cancer   Diagnosis Additional Information: No value filed.    Anesthesia Type:   General, ETT     Note:  Airway :Face Mask  Patient transferred to:PACU  Comments: To PACU, VSS, airway patent, RN at bedside. Patient awake and alert.Handoff Report: Identifed the Patient, Identified the Reponsible Provider, Reviewed the pertinent medical history, Discussed the surgical course, Reviewed Intra-OP anesthesia mangement and issues during anesthesia, Set expectations for post-procedure period and Allowed opportunity for questions and acknowledgement of understanding      Vitals: (Last set prior to Anesthesia Care Transfer)    CRNA VITALS  12/5/2017 1202 - 12/5/2017 1246      12/5/2017             Pulse: 78    SpO2: 99 %    Resp Rate (observed): 15                Electronically Signed By: BERLIN Villatoro CRNA  December 5, 2017  12:46 PM

## 2017-12-05 NOTE — ANESTHESIA PREPROCEDURE EVALUATION
Anesthesia Evaluation     . Pt has had prior anesthetic.     No history of anesthetic complications          ROS/MED HX    ENT/Pulmonary:  - neg pulmonary ROS     Neurologic:       Cardiovascular:     (+) ----. : . . . :. . Previous cardiac testing date:results:date: results:ECG reviewed date: results: date: results:          METS/Exercise Tolerance:  >4 METS   Hematologic:  - neg hematologic  ROS       Musculoskeletal:  - neg musculoskeletal ROS       GI/Hepatic:         Renal/Genitourinary:         Endo:     (+) Obesity, .      Psychiatric:  - neg psychiatric ROS       Infectious Disease:         Malignancy:         Other:                     Physical Exam  Normal systems: cardiovascular and pulmonary    Airway   Mallampati: II  TM distance: >3 FB  Neck ROM: limited    Dental   (+) upper dentures and lower dentures    Cardiovascular       Pulmonary                     Anesthesia Plan      History & Physical Review  History and physical reviewed and following examination; no interval change.    ASA Status:  3 .    NPO Status:  > 8 hours    Plan for General and ETT with Intravenous induction. Maintenance will be Balanced.    PONV prophylaxis:  Ondansetron (or other 5HT-3) and Dexamethasone or Solumedrol  Additional equipment: 2nd IV      Postoperative Care  Postoperative pain management:  Multi-modal analgesia.      Consents  Anesthetic plan, risks, benefits and alternatives discussed with:  Patient.  Use of blood products discussed: Yes.   Consented to blood products.  .                          .

## 2017-12-05 NOTE — BRIEF OP NOTE
[facility] [place]    Brief Operative Note    Pre-operative diagnosis:   1. FIGO Gr 1 endometrioid adenocarcinoma of the uterus  2. Newly diagnosed diabetes mellitus  3. Hypertension    Post-operative diagnosis:  1. FIGO Gr 1 endometrioid adenocarcinoma of the uterus  2. Newly diagnosed diabetes mellitus  3. Hypertension     Procedure: Procedure(s):  DaVinci Assisted Total Laparoscopic Hysterectomy, Right Salpingo-Oophorectomy, Lysis of Adhesions, Cystoscopy - Wound Class: II-Clean Contaminated   - Wound Class: II-Clean Contaminated    Surgeon: Surgeon(s) and Role:  Panel 1:     * Facundo Parekh MD - Primary     * Flakito Burch MD - Resident - Assisting     * Rosmery Chadwick MD - Assisting    Panel 2:     * Facundo Parekh MD - Primary    Anesthesia: General     Estimated blood loss: 50 ml  IVFs:  UOP:    Drains: none    Specimens:   ID Type Source Tests Collected by Time Destination   1 : Urine ([employer] Specimen) Urine Bladder OR DOCUMENTATION ONLY Facundo Parekh MD 12/5/2017 10:23 AM    2 : Pelvic Washings Washings Pelvis CYTOLOGY NON GYN Facundo Parekh MD 12/5/2017 11:06 AM    A : Cervix, Uterus, Right Ovary and Fallopian Tube Tissue Uterus and Cervix SURGICAL PATHOLOGY EXAM Facundo Parekh MD 12/5/2017 11:41 AM      Findings: On exam under anesthesia, normal appearing cervix without lesions. On bimanual, small, anteverted uterus with no adnexal masses appreciated. On laparoscopy, large, ventral hernia with large amount of omental adhesions stuck to the anterior abdominal wall from previous midline incision. Lysis of adhesions carried out for approximately 45 minutes to remove omentum from anterior abdominal wall. Liver was nodular, without lesions. The diaphragm, stomach, and small bowel all appeared normal. The uterus was small, mobile and without lesions. The left tube and ovary were absent. The right tube and ovary were normal appearing.  Frozen section: 1.6 cm, Gr 1 adenocarcinoma with superficial invasion. On cystoscopy, normal bladder dome and trigone with bilateral ureteral jets easily noted. Vaginal exam following procedure identified intact cuff without vaginal lacerations.    Complications: none    Implants: none

## 2017-12-05 NOTE — CONSULTS
[facility], [place]    Internal Medicine Consult - Gold Service       Date of Admission:  12/5/2017  Consult Requested by: Dr. cerda  Reason for Consult: DMII, HTN    Assessment & Plan   Shelbi Howard is a 57 year old female with history of endometrial cancer, HTN and DMI who was admitted to gyn/onc service on 12/5/2017 following total laparoscopy hysterectomy. Internal medicine consulted for assistance with medical management     DMII: No A1c on file and no PTA meds. Glucose 180s-270s, was on insulin gtt during surgery   - Endocrinology and diabetes educator consulted per primary, will defer management to them  - Suggest obtaining A1c    HTN: No PTA meds. BPs 140s-160s/70s postoperatively  - Suggest starting prn hydralazine for SBP>175 or DBP>110  - Would not suggest scheduled anti-hypertensive med at this time, follow up with PCP for ongoing management     FIGO grade 1 endometrioid adenocarcinoma: s/p total laparoscopic hysterectomy, right salpingo-oopherectomy, lysis of adhesions, cystoscopy 12/5.  - Management per primary     DVT Prophylaxis: per primary team     Suggest obtaining CBC and CMP. Above recommendations communicated to primary team via note and paging system. Medicine will sign off at this time. The patient's care was discussed with the patient, endocrinology, and attending physician, Dr. Denton.     Delfina Lares  Internal Medicine Staff Hospitalist Service  [facility]  Pager: [phone]  After 5 PM, melissa gold team cross cover at 1221. If no response, page job code [phone].  ______________________________________________________________________    Chief Complaint   HTN, DMII    History is obtained from the patient and medical record    History of Present Illness   Shelbi Howard is a 57 year old female with history of endometrial cancer, HTN and DMI who was admitted to gyn/onc service on 12/5/2017 following total laparoscopy hysterectomy.  Internal medicine consulted for assistance with medical management     Patient reports she is otherwise in good health. Saw a PCP for the first time in about 5 years for pre-op clearance. Was diagnosed with HTN and DMII at the time but was not started on any meds. No other medical illness or chronic diagnosis. Denies recent illness, fever, headache, confusion, acute visual changes, dizziness, chest pain, dyspnea, cough, abdominal pain, N/V, urinary symptoms, change in bowels, peripheral edema, new onset joint pain, or rash    Review of Systems   The 10 point Review of Systems is negative other than noted in the HPI or here.     Past Medical History    I have reviewed this patient's medical history and updated it with pertinent information if needed.   Past Medical History:   Diagnosis Date     ASCUS of cervix with negative high risk HPV 10/17/2017     Endometrial cancer (H)      Migraine      Obesity         Past Surgical History   I have reviewed this patient's surgical history and updated it with pertinent information if needed.  Past Surgical History:   Procedure Laterality Date     oopherectomy Left     13 lb benign mass removal        Social History   Social History   Substance Use Topics     Smoking status: Former Smoker     Packs/day: 0.50     Years: 38.00     Types: Cigarettes     Quit date: 2/17/2017     Smokeless tobacco: Never Used     Alcohol use Yes      Comment: occasionally        Family History   I have reviewed this patient's family history and updated it with pertinent information if needed.   Family History   Problem Relation Age of Onset     DIABETES Brother      Influenza/Pneumonia Father 62     KIDNEY DISEASE Sister        Medications   I have reviewed this patient's current medications    Allergies   Allergies   Allergen Reactions     Latex Itching       Physical Exam   Vital Signs: Temp: 97.7  F (36.5  C) Temp src: Oral BP: 152/74 Pulse: 69 Heart Rate: 84 Resp: 18 SpO2: 98 % O2 Device: Nasal  cannula Oxygen Delivery: 3 LPM  Weight: 185 lbs 10.04 oz    General Appearance: alert and oriented x3, sitting up in bed  Eyes: PERRLA, anicteric sclera  HEENT: Membranes moist, neck supple.   Respiratory: CATB without wheezing or rales  Cardiovascular: RRR, S1, S2. No murmur noted  GI: Abdomen soft with mild distention. Reducible abdominal hernia. No tenderness, guarding, or rebound   Lymph/Hematologic: No peripheral edema, distal pulses palpable   Skin: No rash or jaundice   Musculoskeletal: Moves all extremities  Neurologic: CN II-XII intact. No focal deficits   Psychiatric: Mood stable     Data   Data reviewed today: I reviewed all medications, new labs and imaging results over the last 24 hours. I personally reviewed prior PCP notes, surgical op note    Data   No lab results found in last 7 days.

## 2017-12-05 NOTE — CONSULTS
Diabetes/Hyperglycemia Management Consult    Chief Complaint new diabetes  Consult requested by: Flakito Burch MD  History of Present Illness Shelbi Howard is a 57 year old woman who underwent laparoscopic total hysterectomy for endometrial cancer today.  She had not sought primary care in over 5 years, so BMP on 11/20/2017 was the first detection of hyperglycemia.  Glucose was in the high 200s that day and this morning on presentation for surgery.  She received dexamethasone 4 mg this morning and glucose was managed with IV insulin infusion during the case.  Drip at 4 units/h stopped at 1400.  Shelbi reports she's had stable weight of 180-185 pounds for the last 20 years.  She's been trying to lose weight without success.  She's had steady appetite lately.  She noted increased thirst but no increase in urination.  She was not overly surprised to hear her glucose was elevated, given her strong family history.  Her brother was able to move from oral medications to management of glucose with only diet and exercise and that would be her goal for herself as well.  She keeps active with walking and gardening (stairs indoors in the winter).  She is not aware of dietary carbohydrate sources.  Hgb A1C not yet drawn, so explained to patient starting therapy is not yet known.  She's feeling fairly well post-surgery.  She was up to walk to bathroom and able to urinate.  She feels hungry and will eat full liquids.  She's expecting to discharge to home tomorrow.        Recent Labs  Lab 12/05/17  1433 12/05/17  1343 12/05/17  1243 12/05/17  1218 12/05/17  1125 12/05/17  1035   * 180* 224* 255* 247* 272*         Diabetes Type: presumed type 2  Factors Impacting Glucose Control: steroid, post-op stress, advancing diet, lack of baseline glucose control      Review of Systems  10 point ROS completed with pertinent positives and negatives noted in the HPI    Past medical, family and social histories are reviewed and  "updated.    Past Medical History  Past Medical History:   Diagnosis Date     ASCUS of cervix with negative high risk HPV 10/17/2017     Endometrial cancer (H)      HTN (hypertension)      Migraine      Obesity        Family History  Family History   Problem Relation Age of Onset     DIABETES Brother      Influenza/Pneumonia Father 62     KIDNEY DISEASE Sister    Father also had type 2 diabetes  Sister has type 2 diabetes    Social History  Social History     Social History     Marital status: Single     Spouse name: N/A     Number of children: One son, 34 yo     Years of education: N/A     Social History Main Topics     Smoking status: Former Smoker     Packs/day: 0.50     Years: 38.00     Types: Cigarettes     Quit date: 2/17/2017     Smokeless tobacco: Never Used     Alcohol use Yes      Comment: occasionally      Drug use: No     Sexual activity: No     Other Topics Concern     None     Social History Narrative   Works as a , on her feet all day long      Physical Exam  /59  Pulse 69  Temp 97.7  F (36.5  C) (Oral)  Resp 18  Ht 1.6 m (5' 2.99\")  Wt 84.2 kg (185 lb 10 oz)  SpO2 98%  BMI 32.89 kg/m2    General:  pleasant woman resting in bed, in no distress.   HEENT: NC/AT, PER and anicteric, non-injected, oral mucous membranes moist.   Lungs:  respiration, no cough  ABD: central obesity  Skin: warm and dry, no obvious lesions  MSK:  fluid movement of all extremities  Lymp:  no LE edema   Mental status:  alert, oriented x3, communicating clearly  Psych:  calm, even mood    Laboratory  Recent Labs   Lab Test  11/20/17   1222   NA  137   POTASSIUM  4.2   CHLORIDE  104   CO2  27   ANIONGAP  6   GLC  274*   BUN  15   CR  0.65   COLTEN  9.2     CBC RESULTS:   Recent Labs   Lab Test  11/20/17   1222   WBC  7.9   RBC  5.12   HGB  15.9*   HCT  46.6   MCV  91   MCH  31.1   MCHC  34.1   RDW  12.8   PLT  208       Liver Function Studies - No results for input(s): PROTTOTAL, ALBUMIN, BILITOTAL, ALKPHOS, " AST, ALT, BILIDIRECT in the last 97552 hours.    Active Medications  Current Facility-Administered Medications   Medication     sodium chloride (PF) 0.9% PF flush 3 mL     sodium chloride (PF) 0.9% PF flush 3 mL     sodium chloride (PF) 0.9% PF flush 3 mL     naloxone (NARCAN) injection 0.1-0.4 mg     morphine (PF) injection 1 mg     oxyCODONE-acetaminophen (PERCOCET) 5-325 MG per tablet 1-2 tablet     ibuprofen (ADVIL/MOTRIN) tablet 600 mg     ondansetron (ZOFRAN-ODT) ODT tab 4 mg    Or     ondansetron (ZOFRAN) injection 4 mg     glucose 40 % gel 15-30 g    Or     dextrose 50 % injection 25-50 mL    Or     glucagon injection 1 mg     lactated ringers infusion     insulin glargine (LANTUS) injection 30 Units     insulin aspart (NovoLOG) inj (RAPID ACTING)     insulin aspart (NovoLOG) inj (RAPID ACTING)     insulin aspart (NovoLOG) inj (RAPID ACTING)     Current Outpatient Prescriptions   Medication Sig Dispense Refill     ibuprofen (ADVIL/MOTRIN) 600 MG tablet Take 1 tablet (600 mg) by mouth every 6 hours as needed for pain (mild) 30 tablet 0     oxyCODONE-acetaminophen (PERCOCET) 5-325 MG per tablet Take 1-2 tablets by mouth every 4 hours as needed for pain (moderate to severe) 30 tablet 0     senna-docusate (SENOKOT-S;PERICOLACE) 8.6-50 MG per tablet Take 1-2 tablets by mouth 2 times daily Take while on oral narcotics to prevent or treat constipation. 30 tablet 0       Current Diet    Active Diet Order      Advance Diet as Tolerated: Regular Diet Adult      Assessment  Shelbi Howard is a 57 year old woman with new type 2 diabetes, s/p underwent laparoscopic total hysterectomy for endometrial cancer today 12/5/2017.  IV insulin infusion would be the most useful, but given her admission status, that is not an option at present.  The steroid administration confounds usual weight-based insulin initiation.    Plan  -start glargine 30 units q24h ASAP  -aspart 1 unit per 5 grams carbohydrate  -aspart 2 per 30 mg/dL  glucose >/=140 q4h  -if glucose is >300 on this regimen, recommend change admission status and start IV insulin infusion  -if glucose trending low, change fluids to dextrose-containing    -diabetes education consult in place  -nutrition education consult requested  -once A1C resulted, will determine home on insulin versus oral antihyperglycemic  -pt plans to establish primary care/diabetes care at [facility]    Yareli Peña APRN [id number]    Diabetes Management Team job code: [phone]

## 2017-12-05 NOTE — IP AVS SNAPSHOT
MRN:[id number]                      After Visit Summary   12/5/2017    Shelbi Howard    MRN: [id number]           Thank you!     Thank you for choosing [facility] for your care. Our goal is always to provide you with excellent care. Hearing back from our patients is one way we can continue to improve our services. Please take a few minutes to complete the written survey that you may receive in the mail after you visit with us. Thank you!        Patient Information     Date Of Birth          1960        About your hospital stay     You were admitted on:  December 5, 2017 You last received care in the:  Unit 6D Observation [facility]    You were discharged on:  December 6, 2017        Reason for your hospital stay       Surgery, Diabetes, High blood pressure                  Who to Call     For medical emergencies, please call 911.  For non-urgent questions about your medical care, please call your primary care provider or clinic, [phone]  For questions related to your surgery, please call your surgery clinic        Attending Provider     Provider Maricruz Belcher MD OB/Gyn    TiannaFacundo forte MD Obstetrics & Gynecology, Maternal & Fetal Medicine       Primary Care Provider Office Phone # Fax #    [facility] [phone]       When to contact your care team       GENERAL POST-OPERATIVE  PATIENT INSTRUCTIONS      FOLLOW-UP:    Call Surgeon if you have:  Temperature greater than 100.4  Persistent nausea and vomiting  Severe uncontrolled pain  Redness, tenderness, or signs of infection (pain, swelling, redness, odor or green/yellow discharge around the site)  Difficulty breathing, headache or visual disturbances  Hives  Persistent dizziness or light-headedness  Extreme fatigue  Any other questions or concerns you may have after discharge    In an emergency, call 911 or go to an Emergency Department at a nearby hospital       WOUND CARE  INSTRUCTIONS:  Keep a dry clean dressing on the wound if there is drainage. If you had a bandage initially, it may be removed after 24 hours.  Once the wound has quit draining you may leave it open to air.  If clothing rubs against the wound or causes irritation and the wound is not draining you may cover it with a dry dressing during the daytime.  Try to keep the wound dry and avoid ointments on the wound unless directed to do so.  If the wound becomes bright red and painful or starts to drain infected material that is not clear, please contact your physician immediately.    1.  You may shower 24 hrs after surgery   2.  No soaking in the tub for 4 weeks       DIET:  There are no dietary restrictions.  You may eat any foods that you can tolerate unless instructed otherwise.  It is a good idea to eat a high fiber diet and take in plenty of fluids to prevent constipation.  If you become constipated, please follow the instructions below.    ACTIVITY:  You are encouraged to cough, deep breath and use your incentive spirometer if you were given one, every 15-30 minutes when awake.  This will help prevent respiratory complications and low grade fevers post-operatively.  You may want to hug a pillow when coughing and sneezing to add additional support to the surgical area, if you had abdominal surgery, which will decrease pain during these times.      1.  No heavy lifting >20lbs or strenuous exercise for six-eight weeks.  No exercise in which you are using core muscles (yoga, pilates, swimming, weight lifting)  2.  You may walk as much as you wish.  You are encouraged to increase your activity each day after surgery.  Stairs are okay.   3.  Nothing per vagina for eight weeks.  No tampons, no intercourse, no douching.  You can expect some light vaginal spotting and discharge for up to six weeks.  If bleeding becomes heavy, please contact the office.     MEDICATIONS:  Try to take narcotic medications and anti-inflammatory  medications, such as tylenol, ibuprofen, naprosyn, etc., with food.  This will minimize stomach upset from the medication.  Should you develop nausea and vomiting from the pain medication, or develop a rash, please discontinue the medication and contact your physician.  You should not drive, make important decisions, or operate machinery when taking narcotic pain medication.    OTHER:  Patients are often constipated after general anesthesia and surgery.  The patient should continue to take stool softeners (for example, Senokot-S) for the next six weeks (unless diarrhea develops) and consume adequate amounts of water.  If the patient remains constipated or unable to pass stool, please try one or all of the following measures:  1.  Milk of Magnesia 30cc twice a day as needed by mouth  2.  Metamucil 2 tablespoons in 12 ounces of fluid  3.  Dulcolax oral or suppositories  4.  Prunes or prune juice  5.  Miralax daily      QUESTIONS:  Please feel free to call your physician or the hospital  if you have any questions, and they will be glad to assist you.                  After Care Instructions     Activity       Your activity upon discharge: activity as tolerated            Diet       Follow this diet upon discharge: Diabetic diet as instructed by dietician            Diet Instructions       Diabetic Diet            Dressing       Keep dressing clean and dry.  Dressing / incisional care as instructed by RN and or Surgeon            Ice to affected area       Ice to operative site PRN            No Alcohol       For 24 hours post procedure            No lifting        No lifting over 20 lbs and no strenuous physical activity for 6 weeks            Shower       No shower for 24 hours post procedure. May shower Postoperative Day (POD) 1            Weight bearing status - As tolerated                 Follow-up Appointments     [facility] Follow-up and recommended labs and tests       Monitor your blood glucose 2  times per day and record your numbers. Bring your log to your PCP appt on 12/8/17.  12/8/17 Primary care Clinic in [place] at 2 pm for follow up regarding High blood pressure and diabetes. Please call [phone] for questions or concerns  12/18/17 Dr Parekh. Please call prior to then for questions or concerns [phone].     Appointments on [place] and/or [facility] (with [facility] or [facility] provider or service). Call [phone] if you haven't heard regarding these appointments within 7 days of discharge.                  Your next 10 appointments already scheduled     Dec 08, 2017  2:00 PM CST   Office Visit with BERLIN Ramos [facility] ([facility])    [address] [place] [id number] [phone]           Bring a current list of meds and any records pertaining to this visit. For Physicals, please bring immunization records and any forms needing to be filled out. Please arrive 10 minutes early to complete paperwork.            Dec 18, 2017 10:40 AM CST   (Arrive by 10:25 AM)   Return Visit with Facundo Parekh MD   [facility] ([facility])    [address] [address] [phone]              Additional Information     If you use hormonal birth control (such as the pill, patch, ring or implants): You'll need a second form of birth control for 7 days (condoms, a diaphragm or contraceptive foam). While in the hospital, you received a medicine called Bridion. Your normal birth control will not work as well for a week after taking this medicine.          Pending Results     Date and Time Order Name Status Description    12/5/2017 1142 Surgical pathology exam Preliminary             Statement of Approval     Ordered          12/06/17 1321  I have reviewed and agree with all the recommendations and orders detailed in this document.  EFFECTIVE NOW    "  Approved and electronically signed by:  Shruthi Reece APRN CNP             Admission Information     Date & Time Provider Department Dept. Phone    12/5/2017 Facundo Parekh MD Unit 6D Observation [facility] [phone]      Your Vitals Were     Blood Pressure Pulse Temperature Respirations Height Weight    123/58 (BP Location: Right arm) 65 98.2  F (36.8  C) (Oral) 16 1.6 m (5' 2.99\") 84.2 kg (185 lb 10 oz)    Pulse Oximetry BMI (Body Mass Index)                93% 32.89 kg/m2          MyChart Information     [employer] gives you secure access to your electronic health record. If you see a primary care provider, you can also send messages to your care team and make appointments. If you have questions, please call your primary care clinic.  If you do not have a primary care provider, please call [phone] and they will assist you.        Care EveryWhere ID     This is your Care EveryWhere ID. This could be used by other organizations to access your [place] medical records  [id number]        Equal Access to Services     [facility] : Hadii sydnee Holder, waaxda luqadaha, qaybta kaalmasarina parikh, favian richardson . So [facility] [phone].    ATENCIÓN: Si habla español, tiene a hagen disposición servicios gratuitos de asistencia lingüística. Llame al [phone].    We comply with applicable federal civil rights laws and [place] laws. We do not discriminate on the basis of race, color, national origin, age, disability, sex, sexual orientation, or gender identity.               Review of your medicines      START taking        Dose / Directions    alcohol swab prep pads   Used for:  Type 2 diabetes mellitus without complication, unspecified long term insulin use status (H)        Use to swab area of injection/jennifer as directed.   Quantity:  100 each   Refills:  0       blood glucose monitoring lancets   Used for:  Type 2 diabetes mellitus without complication, " unspecified long term insulin use status (H)        Use to test blood sugar 2 times daily.   Quantity:  100 each   Refills:  0       blood glucose monitoring test strip   Commonly known as:  IV CONTOUR NEXT   Used for:  Type 2 diabetes mellitus without complication, unspecified long term insulin use status (H)        Use to test blood sugar 2 times daily.   Quantity:  100 strip   Refills:  0       ibuprofen 600 MG tablet   Commonly known as:  ADVIL/MOTRIN        Dose:  600 mg   Take 1 tablet (600 mg) by mouth every 6 hours as needed for pain (mild)   Quantity:  30 tablet   Refills:  0       metFORMIN osmotic 1000 MG 24 hr tablet   Commonly known as:  FORTAMET   Used for:  Type 2 diabetes mellitus without complication, unspecified long term insulin use status (H)        Dose:  1000 mg   Take 1,000 mg by mouth daily (with dinner)   Quantity:  30 tablet   Refills:  0       oxyCODONE-acetaminophen 5-325 MG per tablet   Commonly known as:  PERCOCET        Dose:  1-2 tablet   Take 1-2 tablets by mouth every 4 hours as needed for pain (moderate to severe)   Quantity:  30 tablet   Refills:  0       senna-docusate 8.6-50 MG per tablet   Commonly known as:  SENOKOT-S;PERICOLACE        Dose:  1-2 tablet   Take 1-2 tablets by mouth 2 times daily Take while on oral narcotics to prevent or treat constipation.   Quantity:  60 tablet   Refills:  0         STOP taking     ALEVE PO                Where to get your medicines      These medications were sent to [facility] [place], [place] - [address]  [address] [facility] [address]     Phone:  [phone]     alcohol swab prep pads    blood glucose monitoring lancets    blood glucose monitoring test strip    ibuprofen 600 MG tablet    metFORMIN osmotic 1000 MG 24 hr tablet    senna-docusate 8.6-50 MG per tablet         Some of these will need a paper prescription and others can be bought over the counter. Ask your nurse if you have questions.      Bring a paper prescription for each of these medications     oxyCODONE-acetaminophen 5-325 MG per tablet                Protect others around you: Learn how to safely use, store and throw away your medicines at www.disposemymeds.org.             Medication List: This is a list of all your medications and when to take them. Check marks below indicate your daily home schedule. Keep this list as a reference.      Medications           Morning Afternoon Evening Bedtime As Needed    alcohol swab prep pads   Use to swab area of injection/jennifer as directed.                                blood glucose monitoring lancets   Use to test blood sugar 2 times daily.                                blood glucose monitoring test strip   Commonly known as:  [employer] CONTOUR NEXT   Use to test blood sugar 2 times daily.                                ibuprofen 600 MG tablet   Commonly known as:  ADVIL/MOTRIN   Take 1 tablet (600 mg) by mouth every 6 hours as needed for pain (mild)                                metFORMIN osmotic 1000 MG 24 hr tablet   Commonly known as:  FORTAMET   Take 1,000 mg by mouth daily (with dinner)                                oxyCODONE-acetaminophen 5-325 MG per tablet   Commonly known as:  PERCOCET   Take 1-2 tablets by mouth every 4 hours as needed for pain (moderate to severe)   Last time this was given:  1 tablet on 12/6/2017 11:20 AM                                senna-docusate 8.6-50 MG per tablet   Commonly known as:  SENOKOT-S;PERICOLACE   Take 1-2 tablets by mouth 2 times daily Take while on oral narcotics to prevent or treat constipation.

## 2017-12-05 NOTE — ANESTHESIA POSTPROCEDURE EVALUATION
Patient: Shelbi Shatto    Procedure(s):  DaVinci Assisted Total Laparoscopic Hysterectomy, Right Salpingo-Oophorectomy, Lysis of Adhesions, Cystoscopy - Wound Class: II-Clean Contaminated   - Wound Class: II-Clean Contaminated    Diagnosis:Endometrial Cancer   Diagnosis Additional Information: No value filed.    Anesthesia Type:  General, ETT    Note:  Anesthesia Post Evaluation    Patient location during evaluation: PACU  Patient participation: Able to fully participate in evaluation  Level of consciousness: awake and alert  Pain management: adequate  Airway patency: patent  Cardiovascular status: hemodynamically stable  Respiratory status: acceptable  Hydration status: acceptable  PONV: none             Last vitals:  Vitals:    12/05/17 1300 12/05/17 1315 12/05/17 1330   BP: 151/73 152/75 148/72   Pulse:      Resp: 12 12 12   Temp:  36.5  C (97.7  F)    SpO2: 95% 93% 95%         Electronically Signed By: Patti Lindo MD  December 5, 2017  1:44 PM

## 2017-12-05 NOTE — DISCHARGE SUMMARY
Gynecologic Oncology Discharge Summary    Shelbi Howard  [id number]    Admit Date: 12/5/2017  Discharge Date: 12/6/2017  Admitting Provider: Dr. Facundo Parekh  Discharge Provider: Dr. 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  - Newly diagnosed diabetes mellitus  - Hypertension    Patient Active Problem List   Diagnosis     Post-menopausal bleeding     ASCUS of cervix with negative high risk HPV     Endometrial carcinoma (H)       Procedures:   DaVinci Assisted Total Laparoscopic Hysterectomy, Right Salpingo-Oophorectomy, Lysis of Adhesions, Cystoscopy    Prior to Admission Medications:  Prescriptions Prior to Admission   Medication Sig Dispense Refill Last Dose     Naproxen Sodium (ALEVE PO) Take 2 tablets by mouth 2 times daily as needed for moderate pain    Taking       Discharge Medications:  Current Discharge Medication List      START taking these medications    Details   metFORMIN (FORTAMET) 1000 MG 24 hr tablet Take 1,000 mg by mouth daily (with dinner)  Qty: 30 tablet, Refills: 0    Comments: Generic  Associated Diagnoses: Type 2 diabetes mellitus without complication, unspecified long term insulin use status (H)      ibuprofen (ADVIL/MOTRIN) 600 MG tablet Take 1 tablet (600 mg) by mouth every 6 hours as needed for pain (mild)  Qty: 30 tablet, Refills: 0    Associated Diagnoses: S/P hysterectomy      oxyCODONE-acetaminophen (PERCOCET) 5-325 MG per tablet Take 1-2 tablets by mouth every 4 hours as needed for pain (moderate to severe)  Qty: 30 tablet, Refills: 0    Associated Diagnoses: S/P hysterectomy      senna-docusate (SENOKOT-S;PERICOLACE) 8.6-50 MG per tablet Take 1-2 tablets by mouth 2 times daily Take while on oral narcotics to prevent or treat constipation.  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 Her pap smear at the time was ASC-US, high risk HPV negative. Ultrasound on 11/24/17 showed endometrial thickening of 1.1 cm. Patient reports menopause occurring around age 38 after exploratory laparotomy for a large left ovarian benign mass, at which time she had an LSO. She has never been on HRT and her first episode of post menopausal bleeding was on presentation in October of 2017. During her work up, patient was found to have significantly elevated blood pressures and a fasting blood glucose of 274.     Hospital Course:  Dz:   - Preoperative diagnosis was FIGO grade 1 endometrioid adenocarcinoma.  Frozen section at the time of the surgery showed the same, arising in a background of complex atypical hyperplasia, mass size 1.7 cm, possible minimal invasion.  Final pathology is pending at the time of discharge.  She will follow-up postoperatively for a care plan.  FEN:   - Post operatively patient was not on IVF. She was placed on a diabetic diet and tolerated this without GI upset. She met with DM education and nutrition to discuss diabetic diet.  Pain:   - Patient was started on PO pain meds post operatively. Her pain was well controlled and she was discharged home with these medications.  CV:   - She has no history of CV issues, but was diagnosed with hypertension during her oncology work up. Blood pressure was controlled intraoperatively with IV hydralazine and labetalol. Internal medicine was consulted and recommended no antihypertensives at this time and follow up with PCP. Patient BP improved and was  120s/50s during hospitalization. She will follow up with a PCP as an outpatient for further monitoring of HTN.  PULM:   - She has no history of pulmonary issues.  She was initially given O2 supplementation to maintain her O2 sats in the immediate postop period and was transitioned off of this without difficulty.  By discharge, her O2 sats were greater than 94% on RA.  She was encouraged to use her bedside IS while in house.  She had no acute pulmonary issues while in house.  HEME:   - Her preoperative Hgb was 15.9.  Surgical EBL was 50 mL. Her hgb dropped to 14.0 postoperatively.  She had no other acute heme issues while in house.  GI:   - She was made NPO prior to the procedure.  Postoperatively she was placed on a diabetic diet. She will be discharged with a bowel regimen to prevent constipation in the postoperative period.  She had no acute GI issues while in house.  :    - A dee catheter was placed at the time of the surgery and was removed immediately post op. Prior to discharge, the patient was voiding spontaneously without difficulty.  She had no acute  issues while in house.  ID:   - The patient was AF during her hospitalization.  She received standard preoperative antibiotics without incident.    ENDO:   - Patient was diagnosed with diabetes mellitus during her oncology work up, fasting blood glucose was 274, HGB A1c 9.1. Intraoperatively patient was managed on an insulin gtt. Post operatively patient was placed on a sliding scale, and endocrinology was consulted. Recommend initiating Metformin XL 1000 ml at supper. She met with diabetes educator. She will monitor glucose BID and follow up with PCP on Friday.   PSYCH/NEURO:   - No acute issues.  PPX:    - She was given SCDs and IS during her hospital course.  She tolerated these prophylactic interventions without incident.  They were discontinued at the time of her discharge.      Discharge Instructions and Follow up:  Ms. Shelbi Howard was discharged  from the hospital with follow up for     Discharge Diet: Regular  Discharge Activity: Activity as tolerated  Discharge Follow up: 12/18/17 Dr Parekh. 12/8/17 PCP    Discharge Disposition:  Discharged to home  Discharge condition: Good    Discharge Staff: Dr Shaan Reece CNP  12/6/2017 1:05 PM    I have reviewed and edited ELIUD Reece's Discharge Summary above.  Agree with summary of hospital course.    Maricruz Garduno  12/7/2017  9:06 AM

## 2017-12-05 NOTE — OP NOTE
[facility], [place]     Operative Note     Pre-operative diagnosis:                     1. FIGO Gr 1 endometrioid adenocarcinoma of the uterus  2. Newly diagnosed diabetes mellitus  3. Hypertension     Post-operative diagnosis:  1. FIGO Gr 1 endometrioid adenocarcinoma of the uterus  2. Newly diagnosed diabetes mellitus  3. Hypertension  4. Ventral hernia                           Procedure:           DaVinci Assisted Total Laparoscopic Hysterectomy, Right Salpingo-Oophorectomy, Lysis of Adhesions, Cystoscopy - Wound Class: II-Clean Contaminated     Surgeon:                    * Facundo Parekh MD - Primary     * Flakito Burch MD - Resident - Assisting     * Rosmery Chadwick MD - Assisting     Anesthesia:               General                          Estimated blood loss:            50 ml  Fluids: per anesthesia documentation      Drains:   none     Specimens:                 ID Type Source Tests Collected by Time Destination   1 : Urine (BioNet Specimen) Urine Bladder OR DOCUMENTATION ONLY Facundo Parekh MD 12/5/2017 10:23 AM     2 : Pelvic Washings Washings Pelvis CYTOLOGY NON GYN Facundo Parekh MD 12/5/2017 11:06 AM     A : Cervix, Uterus, Right Ovary and Fallopian Tube Tissue Uterus and Cervix SURGICAL PATHOLOGY EXAM Facundo Parekh MD 12/5/2017 11:41 AM        Findings: On exam under anesthesia, normal appearing cervix without lesions. On bimanual, small, anteverted uterus with no adnexal masses appreciated. On laparoscopy, large, ventral hernia with large amount of omental adhesions stuck to the anterior abdominal wall from previous midline incision. Lysis of adhesions carried out for approximately 45 minutes to remove omentum from anterior abdominal wall. Liver was nodular, without lesions. The diaphragm, stomach, and small bowel all appeared normal. The uterus was small, mobile and without lesions. The left tube and ovary were absent. The right  tube and ovary were normal appearing. Frozen section: 1.6 cm, Gr 1 adenocarcinoma with superficial invasion. On cystoscopy, normal bladder dome and trigone with bilateral ureteral jets easily noted. Vaginal exam following procedure identified intact cuff without vaginal lacerations.     Complications: none     Implants: none    Operative Procedure:   The patient was identified in the preoperative holding area and consent for surgery was confirmed. She was then taken to the operating room where she underwent the administration of general anesthesia. She was then position in the modified dorsal lithotomy position with yellow-fin stirrups. Bilateral arms were padded and tucked on the side. After position was confirmed, the patient was then prepped and draped in the usual sterile fashion.     Pelvic examination was then performed with speculum placed into vagina. The anterior lip of the cervix was grasped with a single-tooth tenaculum, the uterus was sounded, the cervix was then serially dilated with Hegar dilators. An medium V-Care uterine manipulator was placed without difficulty.     I then moved to the abdominal portion of the case. An 8mm incision was made in the midline, directly superior to the umbilicus. A Veress needle was placed followed by insufflation of the abdomen. An 8mm trochar was then placed through this incision. The Drop â€™til you Shopinci camera was then placed through this trochar and the abdominal cavity viewed in its entirety with findings as noted above.  Trochar #1 was placed under direct vision approximately 12cm lateral to and just inferior to the camera trochar on the left side. Robotic trochar #2 was placed in the mid-clavicular line approximately 2cm superior to the camera trochar on the patient's left side. Robotic trochar #3 was placed approximately 12cm lateral to and inferior to the camera trochar on the right side. Finally, an 8mm assistant trochar was placed in the mid-clavicular line approximately  2cm superior to the camera trochar on the patient's right side.     Extensive omental adhesions to the abdominal wall and within a large ventral hernia were noted. These were taken down with a combination of blunt dissection and Ligasure with care to avoid bowel. The omentum and abdominal wall were hemostatic at the end of the dissection. Pelvic cytology was obtained. The robot was then docked from the patient's left side without difficulty. Instruments were placed including graspers, monopolar scissors, and vessel sealer.    I then moved to the console portion of the case. The right uterine cornua was placed on traction using the Prograsp. The right round ligament was identified, grasped and incised using a combination of bipolar and monopolar cautery allowing entry into the right retroperitoneal space. The right ureter was identified, the right infundibulopelvic ligament skeletonized, coagulated and incised using a combination of bipolar and monopolar cautery being visibly free of the ureter at all times. The peritoneal attachments of the right tube and ovary to the medial leaf of the broad ligament was then incised, flush with the uterus. The vesicouterine peritoneum on the right side was incised and the bladder mobilized off the lower uterine segment, cervix and upper vagina using the V-Care ring as a guide to the position of the cervix. The right uterine vessels were then skeletonized, coagulated and incised using a combination of bipolar and monopolar cautery. Throughout this, I remained visibly free of the ureter at all times.     I then moved to the patient's left side. The physiologic adhesions of the sigmoid colon to the left pelvic sidewall were carefully incised. The left uterine cornua was placed on traction using the Prograsp. The left round ligament was identified, grasped and incised using a combination of bipolar and monopolar cautery allowing entry into the left retroperitoneal space. The left  ureter was identified. The left adnexal structures were noted to be absent. The vesicouterine peritoneum on the left side was incised and the bladder mobilized off the lower uterine segment, cervix and upper vagina using the V-Care ring as a guide to the position of the cervix. The left uterine vessels were then skeletonized, coagulated and incised using a combination of bipolar and monopolar cautery. Throughout this, I remained visibly free of the ureter at all times.     The uterus was then placed on traction in the cephalad direction using the Prograsp. A circumferential vaginotomy incision was made using the monopolar scissors, starting anteriorly and following the V-Care to identify the level of the cervix. Once the vaginotomy had been performed, the specimen including cervix, uterus, fallopian tube and ovary was removed through the vagina and sent to pathology for analysis. The pelvis was irrigated thoroughly, hemostasis was confirmed. The bilateral ureters were reinspected and found to be normal. The vaginal balloon was inflated to maintain pneumoperitoneum.    The abdominal cavity was irrigated throughly and hemostasis confirmed. The vaginal cuff was closed with interupted 0-Vicryl sutures, incorporating bilateral uterosacral ligaments into the vaginal apex with the closure. The instruments were removed. The robot was then undocked, the trochars removed. The subcutaneous spaces were irrigated and the trochar skin sites closed with 4-0 monocryl.     A cystoscopy was performed at the end of the procedure after administration of pyridium pre-operatively. The Sanford catheter was removed, a 30 degree cystoscope was inserted and the bladder filled. The bilateral ureteral orifices were identified. Vigorous orange efflux was noted from the both sides. The bladder was drained, cystoscopy removed.    Sponge, instrument and needle count were correct x 2 at completion of the procedure. The patient was woken, extubated  and returned to the recovery room in stable condition.     Facundo Parekh MD, MS    Department of Obstetrics and Gynecology   Division of Gynecologic Oncology   [facility]  Phone: [phone]

## 2017-12-05 NOTE — IP AVS SNAPSHOT
Unit 6D Observation [address] [address]    Phone:  [phone]    Fax:  [phone]                                       After Visit Summary   12/5/2017    Shelbi Howard    MRN: [id number]           After Visit Summary Signature Page     I have received my discharge instructions, and my questions have been answered. I have discussed any challenges I see with this plan with the nurse or doctor.    ..........................................................................................................................................  Patient/Patient Representative Signature      ..........................................................................................................................................  Patient Representative Print Name and Relationship to Patient    ..................................................               ................................................  Date                                            Time    ..........................................................................................................................................  Reviewed by Signature/Title    ...................................................              ..............................................  Date                                                            Time

## 2017-12-06 VITALS
TEMPERATURE: 98.2 F | SYSTOLIC BLOOD PRESSURE: 123 MMHG | DIASTOLIC BLOOD PRESSURE: 58 MMHG | HEART RATE: 65 BPM | RESPIRATION RATE: 16 BRPM | HEIGHT: 63 IN | OXYGEN SATURATION: 93 % | WEIGHT: 185.63 LBS | BODY MASS INDEX: 32.89 KG/M2

## 2017-12-06 LAB
ANION GAP SERPL CALCULATED.3IONS-SCNC: 5 MMOL/L (ref 3–14)
BASOPHILS # BLD AUTO: 0 10E9/L (ref 0–0.2)
BASOPHILS NFR BLD AUTO: 0.1 %
BUN SERPL-MCNC: 17 MG/DL (ref 7–30)
CALCIUM SERPL-MCNC: 8.8 MG/DL (ref 8.5–10.1)
CHLORIDE SERPL-SCNC: 106 MMOL/L (ref 94–109)
CO2 SERPL-SCNC: 27 MMOL/L (ref 20–32)
COPATH REPORT: NORMAL
CREAT SERPL-MCNC: 0.78 MG/DL (ref 0.52–1.04)
DIFFERENTIAL METHOD BLD: ABNORMAL
EOSINOPHIL # BLD AUTO: 0 10E9/L (ref 0–0.7)
EOSINOPHIL NFR BLD AUTO: 0.1 %
ERYTHROCYTE [DISTWIDTH] IN BLOOD BY AUTOMATED COUNT: 13.5 % (ref 10–15)
GFR SERPL CREATININE-BSD FRML MDRD: 75 ML/MIN/1.7M2
GLUCOSE BLDC GLUCOMTR-MCNC: 165 MG/DL (ref 70–99)
GLUCOSE BLDC GLUCOMTR-MCNC: 174 MG/DL (ref 70–99)
GLUCOSE BLDC GLUCOMTR-MCNC: 184 MG/DL (ref 70–99)
GLUCOSE BLDC GLUCOMTR-MCNC: 192 MG/DL (ref 70–99)
GLUCOSE SERPL-MCNC: 173 MG/DL (ref 70–99)
HCT VFR BLD AUTO: 42.3 % (ref 35–47)
HGB BLD-MCNC: 14 G/DL (ref 11.7–15.7)
IMM GRANULOCYTES # BLD: 0 10E9/L (ref 0–0.4)
IMM GRANULOCYTES NFR BLD: 0.3 %
LYMPHOCYTES # BLD AUTO: 1.5 10E9/L (ref 0.8–5.3)
LYMPHOCYTES NFR BLD AUTO: 13.2 %
MCH RBC QN AUTO: 31.1 PG (ref 26.5–33)
MCHC RBC AUTO-ENTMCNC: 33.1 G/DL (ref 31.5–36.5)
MCV RBC AUTO: 94 FL (ref 78–100)
MONOCYTES # BLD AUTO: 1.1 10E9/L (ref 0–1.3)
MONOCYTES NFR BLD AUTO: 10 %
NEUTROPHILS # BLD AUTO: 8.7 10E9/L (ref 1.6–8.3)
NEUTROPHILS NFR BLD AUTO: 76.3 %
NRBC # BLD AUTO: 0 10*3/UL
NRBC BLD AUTO-RTO: 0 /100
PLATELET # BLD AUTO: 201 10E9/L (ref 150–450)
POTASSIUM SERPL-SCNC: 4.3 MMOL/L (ref 3.4–5.3)
RBC # BLD AUTO: 4.5 10E12/L (ref 3.8–5.2)
SODIUM SERPL-SCNC: 139 MMOL/L (ref 133–144)
WBC # BLD AUTO: 11.4 10E9/L (ref 4–11)

## 2017-12-06 PROCEDURE — 25000131 ZZH RX MED GY IP 250 OP 636 PS 637: Performed by: CLINICAL NURSE SPECIALIST

## 2017-12-06 PROCEDURE — 82962 GLUCOSE BLOOD TEST: CPT

## 2017-12-06 PROCEDURE — 85025 COMPLETE CBC W/AUTO DIFF WBC: CPT | Performed by: OBSTETRICS & GYNECOLOGY

## 2017-12-06 PROCEDURE — 36415 COLL VENOUS BLD VENIPUNCTURE: CPT | Performed by: OBSTETRICS & GYNECOLOGY

## 2017-12-06 PROCEDURE — 99024 POSTOP FOLLOW-UP VISIT: CPT | Mod: GC | Performed by: OBSTETRICS & GYNECOLOGY

## 2017-12-06 PROCEDURE — 25000132 ZZH RX MED GY IP 250 OP 250 PS 637: Performed by: OBSTETRICS & GYNECOLOGY

## 2017-12-06 PROCEDURE — 80048 BASIC METABOLIC PNL TOTAL CA: CPT | Performed by: OBSTETRICS & GYNECOLOGY

## 2017-12-06 RX ORDER — IBUPROFEN 600 MG/1
600 TABLET, FILM COATED ORAL EVERY 6 HOURS PRN
Qty: 30 TABLET | Refills: 0 | Status: SHIPPED | OUTPATIENT
Start: 2017-12-06 | End: 2018-07-09

## 2017-12-06 RX ORDER — METFORMIN HCL 500 MG
1000 TABLET, EXTENDED RELEASE 24 HR ORAL
Status: DISCONTINUED | OUTPATIENT
Start: 2017-12-06 | End: 2017-12-06 | Stop reason: HOSPADM

## 2017-12-06 RX ORDER — GLUCOSAMINE HCL/CHONDROITIN SU 500-400 MG
CAPSULE ORAL
Qty: 100 EACH | Refills: 0 | Status: SHIPPED | OUTPATIENT
Start: 2017-12-06

## 2017-12-06 RX ORDER — METFORMIN HYDROCHLORIDE EXTENDED-RELEASE TABLETS 1000 MG/1
1000 TABLET, FILM COATED, EXTENDED RELEASE ORAL
Qty: 30 TABLET | Refills: 0 | Status: SHIPPED | OUTPATIENT
Start: 2017-12-06 | End: 2018-07-09

## 2017-12-06 RX ORDER — OXYCODONE AND ACETAMINOPHEN 5; 325 MG/1; MG/1
1-2 TABLET ORAL EVERY 4 HOURS PRN
Qty: 30 TABLET | Refills: 0 | Status: SHIPPED | OUTPATIENT
Start: 2017-12-06 | End: 2018-03-26

## 2017-12-06 RX ORDER — AMOXICILLIN 250 MG
1-2 CAPSULE ORAL 2 TIMES DAILY
Qty: 60 TABLET | Refills: 0 | Status: SHIPPED | OUTPATIENT
Start: 2017-12-06 | End: 2018-03-26

## 2017-12-06 RX ADMIN — INSULIN ASPART 6 UNITS: 100 INJECTION, SOLUTION INTRAVENOUS; SUBCUTANEOUS at 08:30

## 2017-12-06 RX ADMIN — OXYCODONE HYDROCHLORIDE AND ACETAMINOPHEN 1 TABLET: 5; 325 TABLET ORAL at 01:09

## 2017-12-06 RX ADMIN — OXYCODONE HYDROCHLORIDE AND ACETAMINOPHEN 1 TABLET: 5; 325 TABLET ORAL at 11:20

## 2017-12-06 RX ADMIN — INSULIN ASPART 9 UNITS: 100 INJECTION, SOLUTION INTRAVENOUS; SUBCUTANEOUS at 12:40

## 2017-12-06 ASSESSMENT — PAIN DESCRIPTION - DESCRIPTORS: DESCRIPTORS: SORE

## 2017-12-06 NOTE — PROGRESS NOTES
Gynecology Oncology Progress Note    Date of service: 12/6/17    Ms. Shelbi Howard is a 57 year old POD#1 s/p DA TLH, RSO, SAMANTHA, and cystoscopy for grade 1 endometrial carcinoma.    Subjective: Doing ok.  Some soreness but reports overall pain is well controlled. Denies any nausea and is tolerating PO.  No vaginal bleeding. Ambulating and voiding without issue.    Objective:   Vitals:    12/05/17 1530 12/05/17 1600 12/05/17 1940 12/05/17 2345   BP: 152/74 148/59 140/72 125/66   BP Location: Right arm  Right arm Right arm   Pulse:       Resp: 18 20 16   Temp:   99.5  F (37.5  C) 99.7  F (37.6  C)   TempSrc:   Oral Oral   SpO2: 98%  98% 98%   Weight:       Height:         General: NAD, lying in bed  CV: RRR, no m/r/g  Resp: CTAB  Abdomen: soft, appropriately tender,   Incision: c/d/i, bandages with minimal shadowing  Extremities: nontender, SCDs in place    UOP: NOT Accurate - not recorded, per RN patient has been voiding appropriately     New labs/imaging-  HgbA1C: 9.0%     12/5/2017 14:33 12/5/2017 18:19 12/5/2017 21:07 12/6/2017 00:58 12/6/2017 04:44   Glucose 191 (H) 275 (H) 269 (H) 192 (H) 174 (H)       Assessment: 57 year old POD#1 s/p DA TLH, RSO, SAMANTHA, cysto for grade 1 endometrial carcinoma on EMBx    Dz: Grade 1 endometrioid adenocarcinoma   FEN: reg diet  Pain: Used 1 percocet overnight, ibuprofen PRN but not used  Heme: 11/20 Hgb 15.9> EBL 50> Hgb 14. Appropriate drop in hgb with EBL. Asymptomatic  CV: Hypertension, medicine consult: PRN hydralazine - none required overnight  Pulm: NI  GI: NI  : s/p dee, voiding spontaneously  ID: NI  Endo: Undiagnosed DM2, HgbA1c 9.0, Lantus 30, carb coverage, SSI. Plan for diabetic educator to meet with prior to discharge and outpatient follow up   PPX: SCDs, IS  Dispo: DC home today    Analilia VALDEZ PGY3  Pager [phone]     I have seen and examined the patient.  I have reviewed and edited Dr. Cruz' note above.    Maricruz Garduno  12/6/2017  7:32  AM

## 2017-12-06 NOTE — PROGRESS NOTES
SUBJECTIVE:                                                    Shelbi Howard is a 57 year old female who presents to clinic today for the following health issues:      HPI    Diabetes Follow-up    Patient is checking blood sugars: twice daily.    Blood sugar testing frequency justification: Newly diagnoised  Results are as follows:       Morning - Not below 239 and before supper is usually 240s    Diabetic concerns: None and blood sugar frequently over 200     Symptoms of hypoglycemia (low blood sugar): none     Paresthesias (numbness or burning in feet) or sores: No     Date of last diabetic eye exam:  Haven't had one yet       BP Readings from Last 2 Encounters:   12/06/17 123/58   11/20/17 164/81     Hemoglobin A1C (%)   Date Value   12/05/2017 9.0 (H)     Hypertension Follow-up      Outpatient blood pressures are not being checked.    Low Salt Diet: no added salt      Problem list and histories reviewed & adjusted, as indicated.  Additional history: as documented    Patient Active Problem List   Diagnosis     Post-menopausal bleeding     ASCUS of cervix with negative high risk HPV     Endometrial carcinoma (H)     S/P hysterectomy     Past Surgical History:   Procedure Laterality Date     CYSTOSCOPY N/A 12/5/2017    Procedure: CYSTOSCOPY;;  Surgeon: Facundo Parekh MD;  Location: [facility] OR     DAVINCI HYSTERECTOMY TOTAL, BILATERAL SALPINGO-OOPHORECTOMY, NODE DISSECTION, COMBINED N/A 12/5/2017    Procedure: COMBINED DAVINCI HYSTERECTOMY TOTAL, SALPINGO-OOPHORECTOMY, NODE DISSECTION;  DaVinci Assisted Total Laparoscopic Hysterectomy, Right Salpingo-Oophorectomy, Lysis of Adhesions, Cystoscopy;  Surgeon: Facundo Parekh MD;  Location: [facility] OR     LAPAROSCOPIC HYSTERECTOMY TOTAL  12/2017     oopherectomy Left     13 lb benign mass removal       Social History   Substance Use Topics     Smoking status: Former Smoker     Packs/day: 0.50     Years: 38.00     Types: Cigarettes     Quit date: 2/17/2017      Smokeless tobacco: Never Used     Alcohol use Yes      Comment: occasionally      Family History   Problem Relation Age of Onset     DIABETES Brother      Influenza/Pneumonia Father 62     KIDNEY DISEASE Sister          Current Outpatient Prescriptions   Medication Sig Dispense Refill     lisinopril (PRINIVIL/ZESTRIL) 10 MG tablet Take 1 tablet (10 mg) by mouth daily 90 tablet 1     aspirin 81 MG EC tablet Take 1 tablet (81 mg) by mouth daily 90 tablet 3     metFORMIN (GLUCOPHAGE) 1000 MG tablet Take 1 tablet (1,000 mg) by mouth 2 times daily (with meals) 180 tablet 3     metFORMIN (FORTAMET) 1000 MG 24 hr tablet Take 1,000 mg by mouth daily (with dinner) 30 tablet 0     blood glucose monitoring (VI CONTOUR NEXT) test strip Use to test blood sugar 2 times daily. 100 strip 0     blood glucose monitoring (VI MICROLET) lancets Use to test blood sugar 2 times daily. 100 each 0     alcohol swab prep pads Use to swab area of injection/jennifer as directed. 100 each 0     oxyCODONE-acetaminophen (PERCOCET) 5-325 MG per tablet Take 1-2 tablets by mouth every 4 hours as needed for pain (moderate to severe) 30 tablet 0     ibuprofen (ADVIL/MOTRIN) 600 MG tablet Take 1 tablet (600 mg) by mouth every 6 hours as needed for pain (mild) 30 tablet 0     senna-docusate (SENOKOT-S;PERICOLACE) 8.6-50 MG per tablet Take 1-2 tablets by mouth 2 times daily Take while on oral narcotics to prevent or treat constipation. 60 tablet 0     Recent Labs   Lab Test  12/06/17   0555  12/05/17 2003 11/20/17   1222   A1C   --   9.0*   --    CR  0.78   --   0.65   GFRESTIMATED  75   --   >90   GFRESTBLACK  >90   --   >90   POTASSIUM  4.3   --   4.2      BP Readings from Last 3 Encounters:   12/08/17 122/70   12/06/17 123/58   11/20/17 164/81    Wt Readings from Last 3 Encounters:   12/08/17 183 lb 9.6 oz (83.3 kg)   12/05/17 185 lb 10 oz (84.2 kg)   11/20/17 189 lb 1.6 oz (85.8 kg)                  Labs reviewed in EPIC    ROS:  Constitutional,  "HEENT, cardiovascular, pulmonary, gi and gu systems are negative, except as otherwise noted.      OBJECTIVE:   /70 (BP Location: Right arm, Patient Position: Chair, Cuff Size: Adult Regular)  Pulse 68  Temp 98  F (36.7  C) (Oral)  Resp 18  Ht 5' 2.6\" (1.59 m)  Wt 183 lb 9.6 oz (83.3 kg)  BMI 32.94 kg/m2  Body mass index is 32.94 kg/(m^2).  GENERAL: healthy, alert and no distress  EYES: Eyes grossly normal to inspection, PERRL and conjunctivae and sclerae normal  HENT: ear canals and TM's normal, nose and mouth without ulcers or lesions  NECK: no adenopathy, no asymmetry, masses, or scars and thyroid normal to palpation  RESP: lungs clear to auscultation - no rales, rhonchi or wheezes  CV: regular rate and rhythm, normal S1 S2, no S3 or S4, no murmur, click or rub, no peripheral edema and peripheral pulses strong  ABDOMEN: soft, nontender, no hepatosplenomegaly, no masses and bowel sounds normal  MS: no gross musculoskeletal defects noted, no edema  SKIN: 5 puncture wounds area on right lower quadrant is opened and small amount of drainage present drainage is pink. For other puncture sites are dry and intact. No sign of infection. Large hematoma on right flank area and right abdominal area marked with skin marker.  NEURO: Normal strength and tone, mentation intact and speech normal  BACK: no CVA tenderness, no paralumbar tenderness  PSYCH: mentation appears normal, affect normal/bright    ASSESSMENT/PLAN:       1. New onset type 2 diabetes mellitus (H)  Discussed patient to continue to monitor blood sugars at home and that reading should be less than 140. She is currently taking Glucophage 1000 mg per day recommend increasing this to 2000 mg daily and continue to monitor blood sugars.      - **A1C FUTURE 3mo; Future  - Albumin Random Urine Quantitative with Creat Ratio  - TSH with free T4 reflex  - OPHTHALMOLOGY ADULT REFERRAL  - metFORMIN (GLUCOPHAGE) 1000 MG tablet; Take 1 tablet (1,000 mg) by mouth 2 " times daily (with meals)  Dispense: 180 tablet; Refill: 3  - **Basic metabolic panel FUTURE anytime; Future    2. Type 2 diabetes mellitus with hyperosmolarity without coma, without long-term current use of insulin (H)    - **A1C FUTURE 3mo; Future  - Albumin Random Urine Quantitative with Creat Ratio  - TSH with free T4 reflex  - OPHTHALMOLOGY ADULT REFERRAL  - lisinopril (PRINIVIL/ZESTRIL) 10 MG tablet; Take 1 tablet (10 mg) by mouth daily  Dispense: 90 tablet; Refill: 1  - aspirin 81 MG EC tablet; Take 1 tablet (81 mg) by mouth daily  Dispense: 90 tablet; Refill: 3  - metFORMIN (GLUCOPHAGE) 1000 MG tablet; Take 1 tablet (1,000 mg) by mouth 2 times daily (with meals)  Dispense: 180 tablet; Refill: 3  - **Basic metabolic panel FUTURE anytime; Future    3. Screen for colon cancer  declined    4. Visit for screening mammogram  Test scheduled  - MA SCREENING DIGITAL BILAT - Future  (s+30); Future    5. Need for hepatitis C screening test  done  - Hepatitis C Screen Reflex to HCV RNA Quant and Genotype    6. Need for lipid screening  done  - Lipid panel reflex to direct LDL Non-fasting    7. Encounter for screening mammogram for breast cancer  Done/scheduled      Return to clinic 3 months.     Patient Instructions   Please contact me via my chart if blood sugars continue to run > 140 over next 2-3 weeks after increasing dose of Metformin to 1000 mg two times daily, am and pm.   If blood sugar remains elevated may need to add Glipizide as discussed.   Return to clinic in late Feb or early March for lab draw and follow up office visit.     Start taking Lisinopril 10 mg for kidney protection and Aspirin 81 mg as discussed. Monitor blood pressure as lisinopril may decrease this. Common side effects include dry cough and muscle aches (drink a lot of water)    I will call you with lab results from today.     Thank you  Halie Hernandes CNP

## 2017-12-06 NOTE — PROGRESS NOTES
"/72 (BP Location: Right arm)  Pulse 69  Temp 99.5  F (37.5  C) (Oral)  Resp 20  Ht 1.6 m (5' 2.99\")  Wt 84.2 kg (185 lb 10 oz)  SpO2 98%  BMI 32.89 kg/m2  Patient ambulates to restroom with SBA. She is voiding spontaneously and without difficulty. She is tolerating a regular diet. Lap sites on abdomen have some drainage. Patient endorses discomfort to abdomen. She is alert and oriented x 4 and able to make needs known. Will continue to monitor.  "

## 2017-12-06 NOTE — PROGRESS NOTES
Pt A&Ox4, VSS, and denies pain at this time. Lap sites x5 C/D/I. Per pt, passing gas though has not had a stool since 12/4/17. Tolerating regular diet, voiding spontaneously, and ambulating at baseline. Denies nausea. Pt newly diagnosed with diabetes, diabetes education ordered by provider. Awaiting education and endocrine recommendations before d/c today.

## 2017-12-06 NOTE — PLAN OF CARE
Shelbi was seen bedside for blood glucose meter training. She was given a Contour meter and shown how to do a control test , use the lancet device and test her blood sugar. She then checked her own blood sugar and it was 181. She was also shown how to check meters memory and record blood sugars. I also left information on diabetes and discussed signs of high and low blood sugar and when to contact her doctor.

## 2017-12-06 NOTE — PROGRESS NOTES
Diabetes Consult Daily  Progress Note          Assessment/Plan:   Shelbi Howard is a 57 year old woman with new type 2 diabetes, s/p underwent laparoscopic total hysterectomy for endometrial cancer 12/5/2017.        Glucose improved to a good range with basal bolus insulin started last night.  Given A1C <10 and expected impact of clearance of steroid, will not discharge on insulin.    Plan  -continue aspart for lunch today    -tonight, start metformin XR 1000 mg with supper and continue daily  -glucose checks before breakfast and and in the evening at home  -pt to report to PCP if glucose trends >200 (metformin may then be increased)  -pt to reduce/eliminate regular soda consumption, increase activity, and work toward a 12-13 pound weight loss     -RD already met w/ pt for nutritoin eduction.  Additional diabetes teaching, including monitoring, at 3:30 today    -prescriptions needed at discharge:  Generic metformin XR and meter supplies (details noted in sticky note)     Outpatient diabetes follow up:  Within 1 week, pt to establish primary care/diabetes care at [facility]. She plans to call to make the appt  Plan discussed with patient, diabetes education, and primary team.           Interval History:   The last 24 hours progress and nursing notes reviewed.  Dexamethasone 4 mg yesterday morning.    Glargine wasn't given until 2100, so BG was up to high 200s in the evening beforehand (insulin drip was stopped 1400).  Glucose improved ot 160s-190s overnight and today.    Shikha eating without nausea but feels full and appetite lower.  Abdominal pain okay, but feels bit bloated.  + flatus, but no stool.  Voiding fine.    Reviewed A1C result 9% =  eAG 212. Goal 7% or better =eAG 140 or better.  Drinks two regular sodas per day.  Wants to get off medication so motivated to drop calories/carbs and increase activity.  Explained natural hx of diabetes, so must continue to have  "monitoring even if eventually off medications.  Shikha states understanding.  Says no problem to get into the [facility] clinic quickly.        Recent Labs  Lab 12/06/17  0813 12/06/17  0555 12/06/17  0444 12/06/17  0058 12/05/17  2107 12/05/17  1819 12/05/17  1433   GLC  --  173*  --   --   --   --   --    *  --  174* 192* 269* 275* 191*               Review of Systems:   See interval hx          Medications:       Active Diet Order      Advance Diet as Tolerated: Regular Diet Adult     Physical Exam:  Gen: Alert, resting in bed, in NAD   HEENT: NC/AT, mucous membranes are moist  Resp: Unlabored  Neuro:oriented x3, communicating clearly  /58 (BP Location: Right arm)  Pulse 65  Temp 98.2  F (36.8  C) (Oral)  Resp 16  Ht 1.6 m (5' 2.99\")  Wt 84.2 kg (185 lb 10 oz)  SpO2 93%  BMI 32.89 kg/m2           Data:     Lab Results   Component Value Date    A1C 9.0 12/05/2017              CBC RESULTS:   Recent Labs   Lab Test  12/06/17   0555   WBC  11.4*   RBC  4.50   HGB  14.0   HCT  42.3   MCV  94   MCH  31.1   MCHC  33.1   RDW  13.5   PLT  201     Recent Labs   Lab Test  12/06/17   0555  11/20/17   1222   NA  139  137   POTASSIUM  4.3  4.2   CHLORIDE  106  104   CO2  27  27   ANIONGAP  5  6   GLC  173*  274*   BUN  17  15   CR  0.78  0.65   COLTEN  8.8  9.2           I spent a total of 35 minutes bedside and on the inpatient unit managing the glycemic care of Shelbi Howard. Over 50% of my time on the unit was spent counseling the patient and/or coordinating care regarding new diabetes and treatment going forward.  See note for details.        Yareli Peña APRN -[id number]    Diabetes Management job code [id number]          "

## 2017-12-06 NOTE — PROGRESS NOTES
Gynecologic Oncology Postoperative Check Note  12/5/2017    S: Patient reports she is doing well postoperatively. Pain IS well controlled with Oral pain. Ambulating without pain. Voiding spontaneously. Tolerating crackers and water without nausea or vomiting. Denies chest pain, shortness of breath, dizziness, or other concerns at this time.     O:  Vitals:    12/05/17 1415 12/05/17 1430 12/05/17 1530 12/05/17 1600   BP: 154/74 155/74 152/74 148/59   BP Location:   Right arm    Pulse:       Resp:   18    Temp:       TempSrc:       SpO2: 95% 96% 98%    Weight:       Height:           Gen: In  distress  Cardio: RRR, S1/S2, no murmurs  Resp: CTAB, no wheezing or crackles  Abdomen: soft, appropriately tender  Extremities: Non-tender, no edema    A: 57 year old POD#0 s/p DA TLH, RSO, SAMANTHA, cystoscopy. Doing well.    Dz: Endometrial adenocarcinoma stage I  FEN: Advance as tolerated  Pain: PO PRN  CV: SBP >175 or DBP >110 hydralazine PRN  GI: not yet passing flatus  : voiding spontaneously    Dispo: To home    Sara Phillip MD PGY1  12/5/2017 6:46 PM  OB/GYN Resident

## 2017-12-06 NOTE — PROGRESS NOTES
CLINICAL NUTRITION SERVICES    Reason for Assessment:  Carbohydrate counting nutrition education     Diet History:  Newly diagnosed diabetes. Pt reports no history of receiving carbohydrate counting education in the past.    Nutrition Prescription/Recs:  Consistent Moderate Carbohydrate diet    Interventions:  Nutrition Education  1.  Provided verbal education on carbohydrate-containing foods. Reviewed how to read nutrition labels vs utilizing tracking mobile applications to count carbohydrates of intakes. Reviewed prescribed insulin regimen.   2. Provided the following handouts: Carbohydrate Counting, Guide to Counting Carbohydrates      Goals:    Pt will verbalize at least three carbohydrate-containing foods    Follow-up:   Patient to ask any further nutrition-related questions before discharge.  In addition, pt may request outpatient RD appointment.    Jennie Hernandez RD, LD   7A/6D Pager: [phone]

## 2017-12-06 NOTE — PROGRESS NOTES
VSS. Patient is ambulating independently. Patient is voiding spontaneously without difficulty. Patient received percocet x1 and intermittent ice to abdomen for pain. 5 lap sites are clean, dry, intact; dried drainage is marked with no extension. Patient is tolerating a regular diet. BG monitored q4h; 192 and 174; SS novolog administered per orders. Plan for meeting with diabetic educator and discharge later today.       Temp: 99.7  F (37.6  C) Temp src: Oral BP: 125/66  Heart Rate: 90 Resp: 16 SpO2: 98 % O2 Device: None (Room air)

## 2017-12-06 NOTE — PROGRESS NOTES
Pt cleared for d/c and diabetes education complete.    Discharge instructions reviewed, understood, and signed by patient. VSS, PIV removed, new medications reviewed and understood, patient has all belongings. Patient left unit via ambulating with family.

## 2017-12-07 ENCOUNTER — CARE COORDINATION (OUTPATIENT)
Dept: ONCOLOGY | Facility: CLINIC | Age: 57
End: 2017-12-07

## 2017-12-08 ENCOUNTER — OFFICE VISIT (OUTPATIENT)
Dept: FAMILY MEDICINE | Facility: OTHER | Age: 57
End: 2017-12-08
Payer: COMMERCIAL

## 2017-12-08 VITALS
DIASTOLIC BLOOD PRESSURE: 70 MMHG | SYSTOLIC BLOOD PRESSURE: 122 MMHG | BODY MASS INDEX: 32.53 KG/M2 | WEIGHT: 183.6 LBS | RESPIRATION RATE: 18 BRPM | HEIGHT: 63 IN | HEART RATE: 68 BPM | TEMPERATURE: 98 F

## 2017-12-08 DIAGNOSIS — E11.00 TYPE 2 DIABETES MELLITUS WITH HYPEROSMOLARITY WITHOUT COMA, WITHOUT LONG-TERM CURRENT USE OF INSULIN (H): ICD-10-CM

## 2017-12-08 DIAGNOSIS — Z12.31 VISIT FOR SCREENING MAMMOGRAM: ICD-10-CM

## 2017-12-08 DIAGNOSIS — Z13.220 NEED FOR LIPID SCREENING: ICD-10-CM

## 2017-12-08 DIAGNOSIS — Z11.59 NEED FOR HEPATITIS C SCREENING TEST: ICD-10-CM

## 2017-12-08 DIAGNOSIS — Z12.31 ENCOUNTER FOR SCREENING MAMMOGRAM FOR BREAST CANCER: ICD-10-CM

## 2017-12-08 DIAGNOSIS — E11.9 NEW ONSET TYPE 2 DIABETES MELLITUS (H): Primary | ICD-10-CM

## 2017-12-08 DIAGNOSIS — Z12.11 SCREEN FOR COLON CANCER: ICD-10-CM

## 2017-12-08 LAB
CHOLEST SERPL-MCNC: 125 MG/DL
COPATH REPORT: NORMAL
CREAT UR-MCNC: 182 MG/DL
HDLC SERPL-MCNC: 46 MG/DL
LDLC SERPL CALC-MCNC: 54 MG/DL
MICROALBUMIN UR-MCNC: 22 MG/L
MICROALBUMIN/CREAT UR: 12.36 MG/G CR (ref 0–25)
NONHDLC SERPL-MCNC: 79 MG/DL
T4 FREE SERPL-MCNC: 1.33 NG/DL (ref 0.76–1.46)
TRIGL SERPL-MCNC: 125 MG/DL
TSH SERPL DL<=0.005 MIU/L-ACNC: 0.36 MU/L (ref 0.4–4)

## 2017-12-08 PROCEDURE — 86803 HEPATITIS C AB TEST: CPT | Performed by: NURSE PRACTITIONER

## 2017-12-08 PROCEDURE — 84443 ASSAY THYROID STIM HORMONE: CPT | Performed by: NURSE PRACTITIONER

## 2017-12-08 PROCEDURE — 99214 OFFICE O/P EST MOD 30 MIN: CPT | Performed by: NURSE PRACTITIONER

## 2017-12-08 PROCEDURE — 84439 ASSAY OF FREE THYROXINE: CPT | Performed by: NURSE PRACTITIONER

## 2017-12-08 PROCEDURE — 80061 LIPID PANEL: CPT | Performed by: NURSE PRACTITIONER

## 2017-12-08 PROCEDURE — 36415 COLL VENOUS BLD VENIPUNCTURE: CPT | Performed by: NURSE PRACTITIONER

## 2017-12-08 PROCEDURE — 82043 UR ALBUMIN QUANTITATIVE: CPT | Performed by: NURSE PRACTITIONER

## 2017-12-08 RX ORDER — LISINOPRIL 10 MG/1
10 TABLET ORAL DAILY
Qty: 90 TABLET | Refills: 1 | Status: SHIPPED | OUTPATIENT
Start: 2017-12-08 | End: 2018-05-29

## 2017-12-08 ASSESSMENT — PAIN SCALES - GENERAL: PAINLEVEL: MILD PAIN (3)

## 2017-12-08 NOTE — MR AVS SNAPSHOT
After Visit Summary   12/8/2017    Shelbi oHward    MRN: [id number]           Patient Information     Date Of Birth          1960        Visit Information        Provider Department      12/8/2017 2:00 PM Halie Hernandes APRN CNP [facility]        Today's Diagnoses     New onset type 2 diabetes mellitus (H)    -  1    Type 2 diabetes mellitus with hyperosmolarity without coma, without long-term current use of insulin (H)        Screen for colon cancer        Visit for screening mammogram        Need for hepatitis C screening test        Need for lipid screening        Encounter for screening mammogram for breast cancer        Type 2 diabetes mellitus without complication, unspecified long term insulin use status (H)          Care Instructions    Please contact me via my chart if blood sugars continue to run > 140 over next 2-3 weeks after increasing dose of Metformin to 1000 mg two times daily, am and pm.   If blood sugar remains elevated may need to add Glipizide as discussed.   Return to clinic in late Feb or early March for lab draw and follow up office visit.     Start taking Lisinopril 10 mg for kidney protection and Aspirin 81 mg as discussed. Monitor blood pressure as lisinopril may decrease this. Common side effects include dry cough and muscle aches (drink a lot of water)    I will call you with lab results from today.     Thank you  Halie Hernandes CNP              Follow-ups after your visit        Additional Services     OPHTHALMOLOGY ADULT REFERRAL       Your provider has referred you to: N: [facility] [place] [phone]  [email or web address]    Please be aware that coverage of these services is subject to the terms and limitations of your health insurance plan.  Call member services at your health plan with any benefit or coverage questions.      Please bring the following with you to your appointment:    (1) Any  X-Rays, CTs or MRIs which have been performed.  Contact the facility where they were done to arrange for  prior to your scheduled appointment.    (2) List of current medications  (3) This referral request   (4) Any documents/labs given to you for this referral                  Your next 10 appointments already scheduled     Dec 18, 2017 10:40 AM CST   (Arrive by 10:25 AM)   Return Visit with Facundo Parekh MD   [facility]    [address] [place] [address]  2nd Floor  [facility] [address] [phone]              Future tests that were ordered for you today     Open Future Orders        Priority Expected Expires Ordered    **Basic metabolic panel FUTURE anytime Routine 3/8/2018 12/8/2018 12/8/2017    MA SCREENING DIGITAL BILAT - Future  (s+30) Routine  12/6/2018 12/8/2017    **A1C FUTURE 3mo Routine 3/8/2018 4/7/2018 12/8/2017            Who to contact     If you have questions or need follow up information about today's clinic visit or your schedule please contact [facility] directly at [phone].  Normal or non-critical lab and imaging results will be communicated to you by MyChart, letter or phone within 4 business days after the clinic has received the results. If you do not hear from us within 7 days, please contact the clinic through MyChart or phone. If you have a critical or abnormal lab result, we will notify you by phone as soon as possible.  Submit refill requests through [employer] or call your pharmacy and they will forward the refill request to us. Please allow 3 business days for your refill to be completed.          Additional Information About Your Visit        MyChart Information     [employer] gives you secure access to your electronic health record. If you see a primary care provider, you can also send messages to your care team and make appointments. If you have questions, please call your primary care clinic.  If  "you do not have a primary care provider, please call [phone] and they will assist you.        Care EveryWhere ID     This is your Care EveryWhere ID. This could be used by other organizations to access your [place] medical records  [id number]        Your Vitals Were     Pulse Temperature Respirations Height BMI (Body Mass Index)       68 98  F (36.7  C) (Oral) 18 5' 2.6\" (1.59 m) 32.94 kg/m2        Blood Pressure from Last 3 Encounters:   12/08/17 122/70   12/06/17 123/58   11/20/17 164/81    Weight from Last 3 Encounters:   12/08/17 183 lb 9.6 oz (83.3 kg)   12/05/17 185 lb 10 oz (84.2 kg)   11/20/17 189 lb 1.6 oz (85.8 kg)              We Performed the Following     Albumin Random Urine Quantitative with Creat Ratio     Hepatitis C Screen Reflex to HCV RNA Quant and Genotype     Lipid panel reflex to direct LDL Non-fasting     OPHTHALMOLOGY ADULT REFERRAL     TSH with free T4 reflex          Today's Medication Changes          These changes are accurate as of: 12/8/17  2:53 PM.  If you have any questions, ask your nurse or doctor.               Start taking these medicines.        Dose/Directions    aspirin 81 MG EC tablet   Used for:  Type 2 diabetes mellitus with hyperosmolarity without coma, without long-term current use of insulin (H)   Started by:  Halie Hernandes APRN CNP        Dose:  81 mg   Take 1 tablet (81 mg) by mouth daily   Quantity:  90 tablet   Refills:  3       lisinopril 10 MG tablet   Commonly known as:  PRINIVIL/ZESTRIL   Used for:  Type 2 diabetes mellitus with hyperosmolarity without coma, without long-term current use of insulin (H)   Started by:  Halie Hernandes APRN CNP        Dose:  10 mg   Take 1 tablet (10 mg) by mouth daily   Quantity:  90 tablet   Refills:  1         These medicines have changed or have updated prescriptions.        Dose/Directions    * metFORMIN osmotic 1000 MG 24 hr tablet   Commonly known as:  FORTAMET   This may have changed:  Another " medication with the same name was added. Make sure you understand how and when to take each.   Used for:  Type 2 diabetes mellitus without complication, unspecified long term insulin use status (H)        Dose:  1000 mg   Take 1,000 mg by mouth daily (with dinner)   Quantity:  30 tablet   Refills:  0       * metFORMIN 1000 MG tablet   Commonly known as:  GLUCOPHAGE   This may have changed:  You were already taking a medication with the same name, and this prescription was added. Make sure you understand how and when to take each.   Used for:  New onset type 2 diabetes mellitus (H), Type 2 diabetes mellitus with hyperosmolarity without coma, without long-term current use of insulin (H)   Changed by:  Halie Hernandes APRN CNP        Dose:  1000 mg   Take 1 tablet (1,000 mg) by mouth 2 times daily (with meals)   Quantity:  180 tablet   Refills:  3       * Notice:  This list has 2 medication(s) that are the same as other medications prescribed for you. Read the directions carefully, and ask your doctor or other care provider to review them with you.         Where to get your medicines      These medications were sent to [facility] [address] [place], [place] [id number] [place] [id number]     Phone:  [phone]     aspirin 81 MG EC tablet    lisinopril 10 MG tablet    metFORMIN 1000 MG tablet                Primary Care Provider Office Phone # Fax #    [facility] [phone]       [address] [place], [place] [id number]        Equal Access to Services     RUBY THORPE AH: Hadii sydnee Holder, waaxda lupauline, qaybta kaalmada aderahelyasarina, favian ircks. So [facility] [phone].    ATENCIÓN: Si habla español, tiene a hagen disposición servicios gratuitos de asistencia lingüística. Llame al [phone].    We comply with applicable federal civil rights laws and [place] laws. We do not discriminate on the basis of race,  color, national origin, age, disability, sex, sexual orientation, or gender identity.            Thank you!     Thank you for choosing [facility]  for your care. Our goal is always to provide you with excellent care. Hearing back from our patients is one way we can continue to improve our services. Please take a few minutes to complete the written survey that you may receive in the mail after your visit with us. Thank you!             Your Updated Medication List - Protect others around you: Learn how to safely use, store and throw away your medicines at www.disposemymeds.org.          This list is accurate as of: 12/8/17  2:53 PM.  Always use your most recent med list.                   Brand Name Dispense Instructions for use Diagnosis    alcohol swab prep pads     100 each    Use to swab area of injection/jennifer as directed.    Type 2 diabetes mellitus without complication, unspecified long term insulin use status (H)       aspirin 81 MG EC tablet     90 tablet    Take 1 tablet (81 mg) by mouth daily    Type 2 diabetes mellitus with hyperosmolarity without coma, without long-term current use of insulin (H)       blood glucose monitoring lancets     100 each    Use to test blood sugar 2 times daily.    Type 2 diabetes mellitus without complication, unspecified long term insulin use status (H)       blood glucose monitoring test strip    VI CONTOUR NEXT    100 strip    Use to test blood sugar 2 times daily.    Type 2 diabetes mellitus without complication, unspecified long term insulin use status (H)       ibuprofen 600 MG tablet    ADVIL/MOTRIN    30 tablet    Take 1 tablet (600 mg) by mouth every 6 hours as needed for pain (mild)    S/P hysterectomy       lisinopril 10 MG tablet    PRINIVIL/ZESTRIL    90 tablet    Take 1 tablet (10 mg) by mouth daily    Type 2 diabetes mellitus with hyperosmolarity without coma, without long-term current use of insulin (H)       * metFORMIN osmotic 1000 MG 24 hr  tablet    FORTAMET    30 tablet    Take 1,000 mg by mouth daily (with dinner)    Type 2 diabetes mellitus without complication, unspecified long term insulin use status (H)       * metFORMIN 1000 MG tablet    GLUCOPHAGE    180 tablet    Take 1 tablet (1,000 mg) by mouth 2 times daily (with meals)    New onset type 2 diabetes mellitus (H), Type 2 diabetes mellitus with hyperosmolarity without coma, without long-term current use of insulin (H)       oxyCODONE-acetaminophen 5-325 MG per tablet    PERCOCET    30 tablet    Take 1-2 tablets by mouth every 4 hours as needed for pain (moderate to severe)    S/P hysterectomy       senna-docusate 8.6-50 MG per tablet    SENOKOT-S;PERICOLACE    60 tablet    Take 1-2 tablets by mouth 2 times daily Take while on oral narcotics to prevent or treat constipation.    S/P hysterectomy       * Notice:  This list has 2 medication(s) that are the same as other medications prescribed for you. Read the directions carefully, and ask your doctor or other care provider to review them with you.

## 2017-12-08 NOTE — NURSING NOTE
"Chief Complaint   Patient presents with     Recheck Medication     Panel Management     height, tdap, flu, mammogram, colon cancer, honoring choices, hep c        Initial /70 (BP Location: Right arm, Patient Position: Chair, Cuff Size: Adult Regular)  Pulse 68  Temp 98  F (36.7  C) (Oral)  Resp 18  Ht 5' 2.6\" (1.59 m)  Wt 183 lb 9.6 oz (83.3 kg)  BMI 32.94 kg/m2 Estimated body mass index is 32.94 kg/(m^2) as calculated from the following:    Height as of this encounter: 5' 2.6\" (1.59 m).    Weight as of this encounter: 183 lb 9.6 oz (83.3 kg).  Medication Reconciliation: complete    "

## 2017-12-08 NOTE — PATIENT INSTRUCTIONS
Please contact me via my chart if blood sugars continue to run > 140 over next 2-3 weeks after increasing dose of Metformin to 1000 mg two times daily, am and pm.   If blood sugar remains elevated may need to add Glipizide as discussed.   Return to clinic in late Feb or early March for lab draw and follow up office visit.     Start taking Lisinopril 10 mg for kidney protection and Aspirin 81 mg as discussed. Monitor blood pressure as lisinopril may decrease this. Common side effects include dry cough and muscle aches (drink a lot of water)    I will call you with lab results from today.     Thank you  Halie Hernandes CNP

## 2017-12-11 DIAGNOSIS — E03.8 SUBCLINICAL HYPOTHYROIDISM: Primary | ICD-10-CM

## 2017-12-11 LAB — HCV AB SERPL QL IA: NONREACTIVE

## 2017-12-11 NOTE — PROGRESS NOTES
Please advise Shelbi Howard,  1960, that her lab results were negative for hepatitis C, normal kidney function, thyroid level is slightly elevated this would be considered sub-clinical hypothyroidism recommend rechecking in 3 months will place this order in for you can be done same times as your follow up diabetes labs. Cholesterol normal range.   [phone] (home)   Halie Hernandes

## 2017-12-17 ASSESSMENT — ENCOUNTER SYMPTOMS
WEIGHT GAIN: 0
NIGHT SWEATS: 1
FEVER: 0
HALLUCINATIONS: 0
FATIGUE: 0
POLYDIPSIA: 0
WEIGHT LOSS: 0
INCREASED ENERGY: 0
ALTERED TEMPERATURE REGULATION: 0
CHILLS: 0
POLYPHAGIA: 0
DECREASED APPETITE: 0

## 2017-12-18 ENCOUNTER — ONCOLOGY VISIT (OUTPATIENT)
Dept: ONCOLOGY | Facility: CLINIC | Age: 57
End: 2017-12-18
Attending: OBSTETRICS & GYNECOLOGY
Payer: COMMERCIAL

## 2017-12-18 VITALS
SYSTOLIC BLOOD PRESSURE: 159 MMHG | RESPIRATION RATE: 16 BRPM | TEMPERATURE: 98.2 F | WEIGHT: 181.4 LBS | HEART RATE: 62 BPM | HEIGHT: 63 IN | OXYGEN SATURATION: 98 % | BODY MASS INDEX: 32.14 KG/M2 | DIASTOLIC BLOOD PRESSURE: 81 MMHG

## 2017-12-18 DIAGNOSIS — C54.1 ENDOMETRIAL CANCER (H): Primary | ICD-10-CM

## 2017-12-18 PROCEDURE — 99024 POSTOP FOLLOW-UP VISIT: CPT | Mod: ZP | Performed by: OBSTETRICS & GYNECOLOGY

## 2017-12-18 PROCEDURE — 99212 OFFICE O/P EST SF 10 MIN: CPT | Mod: ZF

## 2017-12-18 ASSESSMENT — PAIN SCALES - GENERAL: PAINLEVEL: NO PAIN (0)

## 2017-12-18 NOTE — LETTER
Date:January 17, 2018      Patient was self referred, no letter generated. Do not send.        [facility]

## 2017-12-18 NOTE — PROGRESS NOTES
GYN ONC Post Op Progress Note    Date: 2017    RE: Shelbi Howard  : 1960  MARISSA: 2017    Shelbi Howard is a 57 year old woman with a diagnosis of Stage IA grade 1 endometrioid adenocarcinoma.   She is here today for a postoperative visit.  She is POD#13 s/p DaTLH, RSO, SAMANTHA, cystoscopy for grade I endometrioid adenocarcinoma.    She reports today she is doing well. Pain is well controlled, only using ibuprofen occasionally.  Reports chills occasional at night, denies any fevers. Eating and drinking well, denies any nausea or vomiting.  Denies any urinary or bowel concerns.  Denies any vaginal bleeding.     Cancer Treatment History:  - 10/17/17 Presented to PCP with postmenopausal bleeding.  - US notable for EMS of 1.1 cm.  -  Endometrial biopsy with Grade 1 endometrioid adenocarcinoma    Past Medical History:   Past Medical History:   Diagnosis Date     ASCUS of cervix with negative high risk HPV 10/17/2017     Endometrial cancer (H)      HTN (hypertension)      Migraine      Obesity      Past Surgical History:   Past Surgical History:   Procedure Laterality Date     CYSTOSCOPY N/A 2017    Procedure: CYSTOSCOPY;;  Surgeon: Facundo Parekh MD;  Location: [facility] OR     DAVINCI HYSTERECTOMY TOTAL, BILATERAL SALPINGO-OOPHORECTOMY, NODE DISSECTION, COMBINED N/A 2017    Procedure: COMBINED DAVINCI HYSTERECTOMY TOTAL, SALPINGO-OOPHORECTOMY, NODE DISSECTION;  DaVinci Assisted Total Laparoscopic Hysterectomy, Right Salpingo-Oophorectomy, Lysis of Adhesions, Cystoscopy;  Surgeon: Facundo Parekh MD;  Location: [facility] OR     LAPAROSCOPIC HYSTERECTOMY TOTAL  2017     oopherectomy Left     13 lb benign mass removal     Current Medications:   has a current medication list which includes the following prescription(s): lisinopril, aspirin, metformin, metformin osmotic, blood glucose monitoring, blood glucose monitoring, alcohol swab, oxycodone-acetaminophen, ibuprofen, and senna-docusate.  "    Allergies:   Allergies   Allergen Reactions     Latex Itching      Social History:  Social History   Substance Use Topics     Smoking status: Former Smoker     Packs/day: 0.50     Years: 38.00     Types: Cigarettes     Quit date: 2/17/2017     Smokeless tobacco: Never Used     Alcohol use Yes      Comment: occasionally      History   Drug Use No     Family History:   The patient's family history is notable for .  Family History   Problem Relation Age of Onset     DIABETES Brother      Influenza/Pneumonia Father 62     KIDNEY DISEASE Sister      Physical Exam:   /81  Pulse 62  Temp 98.2  F (36.8  C) (Oral)  Resp 16  Ht 1.59 m (5' 2.6\")  Wt 82.3 kg (181 lb 6.4 oz)  SpO2 98%  Breastfeeding? No  BMI 32.55 kg/m2  Body mass index is 32.55 kg/(m^2).    Gen:  healthy and alert, no distress  Abd: Soft, non-tender, non-distended, healing intact incision sites  Pelvic: Speculum exam with healing vaginal cuff. Bimanual exam with intact vaginal cuff palpated. No tenderness or vaginal bleeding noted.    Assessment:  Shelbi Howard is a 57 year old woman with a diagnosis of Stage IA grade I endometrioid adenocarcinoma presenting for postop followup.     Plan:   1.)    Discussed results of final pathology revealing Stage IA grade I endometrial adenocarcinoma.  No further treatment is required at this time. Patient will need continued close surveillance. Patient is healing well from surgery.  Readdressed post operative restrictions with a full 6 weeks of no heavy lifting or anything per vagina.  All patient questions were answered.    Return to clinic in 3-4 months for follow-up.    Thank you for allowing us to participate in the care of your patient.       I, Analilia Cruz, PGY3, am serving as scribe for Dr. Parekh.    Analilia Cruz MD  OB/GYN PGY3    Answers for HPI/ROS submitted by the patient on 12/17/2017   General Symptoms: Yes  Skin Symptoms: No  HENT Symptoms: No  EYE SYMPTOMS: No  HEART SYMPTOMS: " No  LUNG SYMPTOMS: No  INTESTINAL SYMPTOMS: No  URINARY SYMPTOMS: No  GYNECOLOGIC SYMPTOMS: No  BREAST SYMPTOMS: No  SKELETAL SYMPTOMS: No  BLOOD SYMPTOMS: No  NERVOUS SYSTEM SYMPTOMS: No  MENTAL HEALTH SYMPTOMS: No  Fever: No  Loss of appetite: No  Weight loss: No  Weight gain: No  Fatigue: No  Night sweats: Yes  Chills: No  Increased stress: No  Excessive hunger: No  Excessive thirst: No  Feeling hot or cold when others believe the temperature is normal: No  Loss of height: No  Post-operative complications: No  Surgical site pain: No  Hallucinations: No  Change in or Loss of Energy: No  Hyperactivity: No  Confusion: No

## 2017-12-18 NOTE — NURSING NOTE
"Oncology Rooming Note    December 18, 2017 10:55 AM   Shelbi Howard is a 57 year old female who presents for:    Chief Complaint   Patient presents with     Oncology Clinic Visit     return patient visit for post op follow up related to Endometrial carcinoma (H)     Initial Vitals: /81  Pulse 62  Temp 98.2  F (36.8  C) (Oral)  Resp 16  Ht 1.59 m (5' 2.6\")  Wt 82.3 kg (181 lb 6.4 oz)  SpO2 98%  Breastfeeding? No  BMI 32.55 kg/m2 Estimated body mass index is 32.55 kg/(m^2) as calculated from the following:    Height as of this encounter: 1.59 m (5' 2.6\").    Weight as of this encounter: 82.3 kg (181 lb 6.4 oz). Body surface area is 1.91 meters squared.  No Pain (0) Comment: Data Unavailable   No LMP recorded. Patient is postmenopausal.  Allergies reviewed: Yes  Medications reviewed: Yes    Medications: Medication refills not needed today.  Pharmacy name entered into [employer]: [facility] [address] [place] [id number] [facility]    Clinical concerns: no concerns dr. cerda was notified.    6 minutes for nursing intake (face to face time)     Yossi Lam CMA              "

## 2017-12-18 NOTE — MR AVS SNAPSHOT
After Visit Summary   12/18/2017    Shelbi Howard    MRN: [id number]           Patient Information     Date Of Birth          1960        Visit Information        Provider Department      12/18/2017 10:40 AM Facundo Parekh MD [facility]         Follow-ups after your visit        Your next 10 appointments already scheduled     Mar 20, 2018  3:30 PM CDT   (Arrive by 3:15 PM)   Return Visit with BERLIN Dang CNP   [facility] ([facility])    [address] [place] [address]  [address] [facility] [address] [phone]              Who to contact     If you have questions or need follow up information about today's clinic visit or your schedule please contact [facility] directly at [phone].  Normal or non-critical lab and imaging results will be communicated to you by MyChart, letter or phone within 4 business days after the clinic has received the results. If you do not hear from us within 7 days, please contact the clinic through MyChart or phone. If you have a critical or abnormal lab result, we will notify you by phone as soon as possible.  Submit refill requests through [employer] or call your pharmacy and they will forward the refill request to us. Please allow 3 business days for your refill to be completed.          Additional Information About Your Visit        MyChart Information     [employer] gives you secure access to your electronic health record. If you see a primary care provider, you can also send messages to your care team and make appointments. If you have questions, please call your primary care clinic.  If you do not have a primary care provider, please call [phone] and they will assist you.        Care EveryWhere ID     This is your Care EveryWhere ID. This could be used by other organizations to access your [facility] medical records  [id number]        Your Vitals Were      "Pulse Temperature Respirations Height Pulse Oximetry Breastfeeding?    62 98.2  F (36.8  C) (Oral) 16 1.59 m (5' 2.6\") 98% No    BMI (Body Mass Index)                   32.55 kg/m2            Blood Pressure from Last 3 Encounters:   12/18/17 159/81   12/08/17 122/70   12/06/17 123/58    Weight from Last 3 Encounters:   12/18/17 82.3 kg (181 lb 6.4 oz)   12/08/17 83.3 kg (183 lb 9.6 oz)   12/05/17 84.2 kg (185 lb 10 oz)              Today, you had the following     No orders found for display       Primary Care Provider Office Phone # Fax #    [facility] [phone]       [address] [place] [address]        Equal Access to Services     RUBY THORPE : Bryna Holder, waaxda luqadaha, qaybta kaalmada kati, favian richardson . [facility] [phone].    ATENCIÓN: Si habla español, tiene a hagen disposición servicios gratuitos de asistencia lingüística. [facility] [phone].    We comply with applicable federal civil rights laws and [place] laws. We do not discriminate on the basis of race, color, national origin, age, disability, sex, sexual orientation, or gender identity.            Thank you!     Thank you for choosing [facility]  for your care. Our goal is always to provide you with excellent care. Hearing back from our patients is one way we can continue to improve our services. Please take a few minutes to complete the written survey that you may receive in the mail after your visit with us. Thank you!             Your Updated Medication List - Protect others around you: Learn how to safely use, store and throw away your medicines at www.disposemymeds.org.          This list is accurate as of: 12/18/17 12:05 PM.  Always use your most recent med list.                   Brand Name Dispense Instructions for use Diagnosis    alcohol swab prep pads     100 each    Use to swab area of injection/jennifer as directed.    Type " 2 diabetes mellitus without complication, unspecified long term insulin use status (H)       aspirin 81 MG EC tablet     90 tablet    Take 1 tablet (81 mg) by mouth daily    Type 2 diabetes mellitus with hyperosmolarity without coma, without long-term current use of insulin (H)       blood glucose monitoring lancets     100 each    Use to test blood sugar 2 times daily.    Type 2 diabetes mellitus without complication, unspecified long term insulin use status (H)       blood glucose monitoring test strip    VI CONTOUR NEXT    100 strip    Use to test blood sugar 2 times daily.    Type 2 diabetes mellitus without complication, unspecified long term insulin use status (H)       ibuprofen 600 MG tablet    ADVIL/MOTRIN    30 tablet    Take 1 tablet (600 mg) by mouth every 6 hours as needed for pain (mild)    S/P hysterectomy       lisinopril 10 MG tablet    PRINIVIL/ZESTRIL    90 tablet    Take 1 tablet (10 mg) by mouth daily    Type 2 diabetes mellitus with hyperosmolarity without coma, without long-term current use of insulin (H)       * metFORMIN osmotic 1000 MG 24 hr tablet    FORTAMET    30 tablet    Take 1,000 mg by mouth daily (with dinner)    Type 2 diabetes mellitus without complication, unspecified long term insulin use status (H)       * metFORMIN 1000 MG tablet    GLUCOPHAGE    180 tablet    Take 1 tablet (1,000 mg) by mouth 2 times daily (with meals)    New onset type 2 diabetes mellitus (H), Type 2 diabetes mellitus with hyperosmolarity without coma, without long-term current use of insulin (H)       oxyCODONE-acetaminophen 5-325 MG per tablet    PERCOCET    30 tablet    Take 1-2 tablets by mouth every 4 hours as needed for pain (moderate to severe)    S/P hysterectomy       senna-docusate 8.6-50 MG per tablet    SENOKOT-S;PERICOLACE    60 tablet    Take 1-2 tablets by mouth 2 times daily Take while on oral narcotics to prevent or treat constipation.    S/P hysterectomy       * Notice:  This list has 2  medication(s) that are the same as other medications prescribed for you. Read the directions carefully, and ask your doctor or other care provider to review them with you.

## 2017-12-18 NOTE — LETTER
2017       RE: Shelbi Howard  [address] [address] [address]     Dear Colleague,    Thank you for referring your patient, Shelbi Howard, to the [facility]. Please see a copy of my visit note below.    GYN ONC Post Op Progress Note    Date: 2017    RE: Shelbi Howard  : 1960  MARISSA: 2017    Shelbi Howard is a 57 year old woman with a diagnosis of Stage IA grade 1 endometrioid adenocarcinoma.   She is here today for a postoperative visit.  She is POD#13 s/p DaTLH, RSO, SAMANTHA, cystoscopy for grade I endometrioid adenocarcinoma.    She reports today she is doing well. Pain is well controlled, only using ibuprofen occasionally.  Reports chills occasional at night, denies any fevers. Eating and drinking well, denies any nausea or vomiting.  Denies any urinary or bowel concerns.  Denies any vaginal bleeding.     Cancer Treatment History:  - 10/17/17 Presented to PCP with postmenopausal bleeding.  - US notable for EMS of 1.1 cm.  -  Endometrial biopsy with Grade 1 endometrioid adenocarcinoma    Past Medical History:   Past Medical History:   Diagnosis Date     ASCUS of cervix with negative high risk HPV 10/17/2017     Endometrial cancer (H)      HTN (hypertension)      Migraine      Obesity      Past Surgical History:   Past Surgical History:   Procedure Laterality Date     CYSTOSCOPY N/A 2017    Procedure: CYSTOSCOPY;;  Surgeon: Facundo Parekh MD;  Location: [facility] OR     DAVINCI HYSTERECTOMY TOTAL, BILATERAL SALPINGO-OOPHORECTOMY, NODE DISSECTION, COMBINED N/A 2017    Procedure: COMBINED DAVINCI HYSTERECTOMY TOTAL, SALPINGO-OOPHORECTOMY, NODE DISSECTION;  DaVinci Assisted Total Laparoscopic Hysterectomy, Right Salpingo-Oophorectomy, Lysis of Adhesions, Cystoscopy;  Surgeon: Facundo Parekh MD;  Location: [facility] OR     LAPAROSCOPIC HYSTERECTOMY TOTAL  2017     oopherectomy Left     13 lb benign mass removal     Current Medications:   has a current medication  "list which includes the following prescription(s): lisinopril, aspirin, metformin, metformin osmotic, blood glucose monitoring, blood glucose monitoring, alcohol swab, oxycodone-acetaminophen, ibuprofen, and senna-docusate.     Allergies:   Allergies   Allergen Reactions     Latex Itching      Social History:  Social History   Substance Use Topics     Smoking status: Former Smoker     Packs/day: 0.50     Years: 38.00     Types: Cigarettes     Quit date: 2/17/2017     Smokeless tobacco: Never Used     Alcohol use Yes      Comment: occasionally      History   Drug Use No     Family History:   The patient's family history is notable for .  Family History   Problem Relation Age of Onset     DIABETES Brother      Influenza/Pneumonia Father 62     KIDNEY DISEASE Sister      Physical Exam:   /81  Pulse 62  Temp 98.2  F (36.8  C) (Oral)  Resp 16  Ht 1.59 m (5' 2.6\")  Wt 82.3 kg (181 lb 6.4 oz)  SpO2 98%  Breastfeeding? No  BMI 32.55 kg/m2  Body mass index is 32.55 kg/(m^2).    Gen:  healthy and alert, no distress  Abd: Soft, non-tender, non-distended, healing intact incision sites  Pelvic: Speculum exam with healing vaginal cuff. Bimanual exam with intact vaginal cuff palpated. No tenderness or vaginal bleeding noted.    Assessment:  Shelbi Howard is a 57 year old woman with a diagnosis of Stage IA grade I endometrioid adenocarcinoma presenting for postop followup.     Plan:   1.)    Discussed results of final pathology revealing Stage IA grade I endometrial adenocarcinoma.  No further treatment is required at this time. Patient will need continued close surveillance. Patient is healing well from surgery.  Readdressed post operative restrictions with a full 6 weeks of no heavy lifting or anything per vagina.  All patient questions were answered.    Return to clinic in 3-4 months for follow-up.    Thank you for allowing us to participate in the care of your patient.       I, Analilia Cruz, PGY3, am serving as " santy for Dr. Parekh.    Analilia Cruz MD  OB/GYN PGY3    Answers for HPI/ROS submitted by the patient on 12/17/2017   General Symptoms: Yes  Skin Symptoms: No  HENT Symptoms: No  EYE SYMPTOMS: No  HEART SYMPTOMS: No  LUNG SYMPTOMS: No  INTESTINAL SYMPTOMS: No  URINARY SYMPTOMS: No  GYNECOLOGIC SYMPTOMS: No  BREAST SYMPTOMS: No  SKELETAL SYMPTOMS: No  BLOOD SYMPTOMS: No  NERVOUS SYSTEM SYMPTOMS: No  MENTAL HEALTH SYMPTOMS: No  Fever: No  Loss of appetite: No  Weight loss: No  Weight gain: No  Fatigue: No  Night sweats: Yes  Chills: No  Increased stress: No  Excessive hunger: No  Excessive thirst: No  Feeling hot or cold when others believe the temperature is normal: No  Loss of height: No  Post-operative complications: No  Surgical site pain: No  Hallucinations: No  Change in or Loss of Energy: No  Hyperactivity: No  Confusion: No      Again, thank you for allowing me to participate in the care of your patient.      Sincerely,    Facundo Parekh MD

## 2017-12-21 LAB — COPATH REPORT: NORMAL

## 2018-01-09 ENCOUNTER — TELEPHONE (OUTPATIENT)
Dept: FAMILY MEDICINE | Facility: OTHER | Age: 58
End: 2018-01-09

## 2018-01-09 DIAGNOSIS — E11.9 TYPE 2 DIABETES MELLITUS WITHOUT COMPLICATION, UNSPECIFIED LONG TERM INSULIN USE STATUS: ICD-10-CM

## 2018-01-09 NOTE — TELEPHONE ENCOUNTER
Please let Shelbi know that her refills for glucose test strips and lancets were sent to [facility] [place] [facility].     Thank you  Halie Hernandes CNP

## 2018-01-09 NOTE — LETTER
January 10, 2018      Shelbi Howard  [address] [address] [facility] [address]        Dear Shelbi,     We have tried to reach you by phone and have been unsuccessful. This letter is to inform you that refills for your glucose test strips and lancets were sent to [facility].    Thank you,  [facility]

## 2018-01-10 NOTE — TELEPHONE ENCOUNTER
Left message for patient to call back. Please see message below. 3rd attempt. Letter sent.  Sharmila Lomas CMA

## 2018-01-11 PROBLEM — R87.610 ASCUS OF CERVIX WITH NEGATIVE HIGH RISK HPV: Status: ACTIVE | Noted: 2017-10-17

## 2018-03-07 ENCOUNTER — TELEPHONE (OUTPATIENT)
Dept: FAMILY MEDICINE | Facility: OTHER | Age: 58
End: 2018-03-07

## 2018-03-07 NOTE — TELEPHONE ENCOUNTER
Summary:    Patient is due/failing the following:   A1C and FOLLOW UP    Action needed:   Patient needs office visit for Diabetic follow up. and Patient needs non-fasting lab only appointment    Type of outreach:    Phone, left message for patient to call back.     Questions for provider review:    None                                                                                                                                    Suzi Trinidad     Chart routed to Care Team .      Panel Management Review      Patient has the following on her problem list:     Diabetes    ASA: Passed    Last A1C  Lab Results   Component Value Date    A1C 9.0 12/05/2017     A1C tested: FAILED    Last LDL:    Lab Results   Component Value Date    CHOL 125 12/08/2017     Lab Results   Component Value Date    HDL 46 12/08/2017     Lab Results   Component Value Date    LDL 54 12/08/2017     Lab Results   Component Value Date    TRIG 125 12/08/2017     No results found for: CHOLHDLRATIO  Lab Results   Component Value Date    NHDL 79 12/08/2017       Is the patient on a Statin? NO             Is the patient on Aspirin? YES    Medications     Salicylates    aspirin 81 MG EC tablet          Last three blood pressure readings:  BP Readings from Last 3 Encounters:   12/18/17 159/81   12/08/17 122/70   12/06/17 123/58            Tobacco History:     History   Smoking Status     Former Smoker     Packs/day: 0.50     Years: 38.00     Types: Cigarettes     Quit date: 2/17/2017   Smokeless Tobacco     Never Used           Composite cancer screening  Chart review shows that this patient is due/due soon for the following Mammogram and Colonoscopy

## 2018-03-20 ENCOUNTER — ONCOLOGY VISIT (OUTPATIENT)
Dept: ONCOLOGY | Facility: CLINIC | Age: 58
End: 2018-03-20
Attending: NURSE PRACTITIONER
Payer: COMMERCIAL

## 2018-03-20 VITALS
RESPIRATION RATE: 16 BRPM | WEIGHT: 181.22 LBS | HEART RATE: 60 BPM | BODY MASS INDEX: 32.51 KG/M2 | SYSTOLIC BLOOD PRESSURE: 162 MMHG | TEMPERATURE: 97.5 F | DIASTOLIC BLOOD PRESSURE: 82 MMHG | OXYGEN SATURATION: 98 %

## 2018-03-20 DIAGNOSIS — C54.1 ENDOMETRIAL CARCINOMA (H): Primary | ICD-10-CM

## 2018-03-20 DIAGNOSIS — I15.9 SECONDARY HYPERTENSION: ICD-10-CM

## 2018-03-20 PROCEDURE — 99213 OFFICE O/P EST LOW 20 MIN: CPT | Mod: 25 | Performed by: NURSE PRACTITIONER

## 2018-03-20 PROCEDURE — G0463 HOSPITAL OUTPT CLINIC VISIT: HCPCS | Mod: 25

## 2018-03-20 PROCEDURE — 88305 TISSUE EXAM BY PATHOLOGIST: CPT | Performed by: NURSE PRACTITIONER

## 2018-03-20 PROCEDURE — 57100 BIOPSY VAGINAL MUCOSA SIMPLE: CPT | Mod: ZF | Performed by: NURSE PRACTITIONER

## 2018-03-20 ASSESSMENT — ENCOUNTER SYMPTOMS
POSTURAL DYSPNEA: 0
EYE REDNESS: 0
WEAKNESS: 0
NECK MASS: 0
DIFFICULTY URINATING: 0
MEMORY LOSS: 0
DECREASED APPETITE: 0
SNORES LOUDLY: 0
WEIGHT GAIN: 0
DYSPNEA ON EXERTION: 0
SORE THROAT: 0
CONSTIPATION: 0
WHEEZING: 0
HALLUCINATIONS: 0
CHILLS: 0
HEARTBURN: 0
TASTE DISTURBANCE: 0
SMELL DISTURBANCE: 0
JAUNDICE: 0
FATIGUE: 0
LOSS OF CONSCIOUSNESS: 0
BACK PAIN: 0
RECTAL PAIN: 0
BLOOD IN STOOL: 0
TACHYCARDIA: 0
HOT FLASHES: 0
CLAUDICATION: 0
EYE PAIN: 0
RESPIRATORY PAIN: 0
SLEEP DISTURBANCES DUE TO BREATHING: 0
LEG SWELLING: 0
DOUBLE VISION: 0
NAUSEA: 0
NECK PAIN: 0
NAIL CHANGES: 0
FLANK PAIN: 0
BOWEL INCONTINENCE: 0
LEG PAIN: 0
DYSURIA: 0
NERVOUS/ANXIOUS: 0
DECREASED LIBIDO: 0
BRUISES/BLEEDS EASILY: 0
SKIN CHANGES: 0
DIZZINESS: 0
COUGH: 0
LIGHT-HEADEDNESS: 0
DIARRHEA: 0
TREMORS: 0
INSOMNIA: 0
EXTREMITY NUMBNESS: 0
VOMITING: 0
ABDOMINAL PAIN: 0
POOR WOUND HEALING: 0
SWOLLEN GLANDS: 0
PANIC: 0
SEIZURES: 0
ALTERED TEMPERATURE REGULATION: 0
JOINT SWELLING: 0
WEIGHT LOSS: 0
NUMBNESS: 0
BREAST MASS: 0
HEMATURIA: 0
TROUBLE SWALLOWING: 0
RECTAL BLEEDING: 0
DECREASED CONCENTRATION: 0
COUGH DISTURBING SLEEP: 0
EYE IRRITATION: 0
DISTURBANCES IN COORDINATION: 0
NIGHT SWEATS: 0
SINUS PAIN: 0
PARALYSIS: 0
SYNCOPE: 0
MUSCLE WEAKNESS: 0
MUSCLE CRAMPS: 0
SINUS CONGESTION: 0
ORTHOPNEA: 0
STIFFNESS: 0
POLYDIPSIA: 0
ARTHRALGIAS: 0
HEMOPTYSIS: 0
BLOATING: 0
MYALGIAS: 0
EXERCISE INTOLERANCE: 0
BREAST PAIN: 0
TINGLING: 0
FEVER: 0
INCREASED ENERGY: 0
HOARSE VOICE: 0
HYPOTENSION: 0
SPEECH CHANGE: 0
HEADACHES: 0
PALPITATIONS: 0
HYPERTENSION: 0
EYE WATERING: 0
POLYPHAGIA: 0
SPUTUM PRODUCTION: 0
DEPRESSION: 0
SHORTNESS OF BREATH: 0

## 2018-03-20 ASSESSMENT — PAIN SCALES - GENERAL: PAINLEVEL: NO PAIN (0)

## 2018-03-20 NOTE — LETTER
3/20/2018       RE: Shelbi Howard  [id number] [facility] [id number]     Dear Colleague,    Thank you for referring your patient, Shelbi Howard, to the [facility]. Please see a copy of my visit note below.                Follow Up Notes on Referred Patient    Date: 3/20/2018        RE: Shelbi Howard  : 1960  MARISSA: 3/20/2018      Shelbi Howard is a 58 year old woman with a diagnosis of stage IA grade 1 endometrioid endometrial adenocarcinoma. She is here today for a surveillance visit.     Oncology history:  10/17/17 Presented to PCP with postmenopausal bleeding.  US notable for EMS of 1.1 cm.  Endometrial biopsy with Grade 1 endometrioid adenocarcinoma    17: DaVinci Assisted Total Laparoscopic Hysterectomy, Right Salpingo-Oophorectomy, Lysis of Adhesions, Cystoscopy  SPECIMEN(S):   Cervix, uterus, right ovary fallopian tube     FINAL DIAGNOSIS:   CERVIX, UTERUS, RIGHT OVARY AND FALLOPIAN TUBE, HYSTERECTOMY AND RIGHT   SALPINGO-OOPHORECTOMY:   - Endometrial endometrioid adenocarcinoma, FIGO grade 1, arising in endometrial polyp with atypical hyperplasia   - Myometrium with no significant histologic abnormality   - Cervix with Nabothian cysts   - Ovary and fallopian tube with no significant histologic abnormality           MLH1 Expression:             - Loss of nuclear expression         MSH2 Expression:             - Intact nuclear expression         MSH6 Expression:             - Intact nuclear expression         PMS2 Expression:             - Loss of nuclear expression    MLH1 promoter methylation positive      Today she comes to clinic feeling well and denies any concerning symptoms. She denies any vaginal bleeding, no changes in her bowel or bladder habits, no nausea/emesis, no lower extremity edema, and no difficulties eating or sleeping. She denies any abdominal discomfort/bloating, no fevers or chills, and no chest pain or shortness of breath. She is seeing her PCP Monday  "for her annual exam; her mammogram is due then as well. She has never had a colonoscopy or other colon cancer screening. She is not sexually active and denies any vaginal dryness. She does check her BP at home and reports readings of <140. She states she was told she has had a ventral hernia for \"a long time\".       Review of Systems     Constitutional:  Negative for fever, chills, weight loss, weight gain, fatigue, decreased appetite, night sweats, recent stressors, height gain, height loss, post-operative complications, incisional pain, hallucinations, increased energy, hyperactivity and confused.   HENT:  Negative for ear pain, hearing loss, tinnitus, nosebleeds, trouble swallowing, hoarse voice, mouth sores, sore throat, ear discharge, tooth pain, gum tenderness, taste disturbance, smell disturbance, hearing aid, bleeding gums, dry mouth, sinus pain, sinus congestion and neck mass.    Eyes:  Negative for double vision, pain, redness, eye pain, decreased vision, eye watering, eye bulging, eye dryness, flashing lights, spots, floaters, strabismus, tunnel vision, jaundice and eye irritation.   Respiratory:   Negative for cough, hemoptysis, sputum production, shortness of breath, wheezing, sleep disturbances due to breathing, snores loudly, respiratory pain, dyspnea on exertion, cough disturbing sleep and postural dyspnea.    Cardiovascular:  Negative for chest pain, dyspnea on exertion, palpitations, orthopnea, claudication, leg swelling, fingers/toes turn blue, hypertension, hypotension, syncope, history of heart murmur, chest pain on exertion, chest pain at rest, pacemaker, few scattered varicosities, leg pain, sleep disturbances due to breathing, tachycardia, light-headedness, exercise intolerance and edema.   Gastrointestinal:  Negative for heartburn, nausea, vomiting, abdominal pain, diarrhea, constipation, blood in stool, melena, rectal pain, bloating, hemorrhoids, bowel incontinence, jaundice, rectal " bleeding, coffee ground emesis and change in stool.   Genitourinary:  Negative for bladder incontinence, dysuria, urgency, hematuria, flank pain, vaginal discharge, difficulty urinating, genital sores, dyspareunia, decreased libido, nocturia, voiding less frequently, arousal difficulty, abnormal vaginal bleeding, excessive menstruation, menstrual changes, hot flashes, vaginal dryness and postmenopausal bleeding.   Musculoskeletal:  Negative for myalgias, back pain, joint swelling, arthralgias, stiffness, muscle cramps, neck pain, bone pain, muscle weakness and fracture.   Skin:  Negative for nail changes, itching, poor wound healing, rash, hair changes, skin changes, acne, warts, poor wound healing, scarring, flaky skin, Raynaud's phenomenon, sensitivity to sunlight and skin thickening.   Neurological:  Negative for dizziness, tingling, tremors, speech change, seizures, loss of consciousness, weakness, light-headedness, numbness, headaches, disturbances in coordination, extremity numbness, memory loss, difficulty walking and paralysis.   Endo/Heme:  Negative for anemia, swollen glands and bruises/bleeds easily.   Psychiatric/Behavioral:  Negative for depression, hallucinations, memory loss, decreased concentration, mood swings and panic attacks.    Breast:  Negative for breast discharge, breast mass, breast pain and nipple retraction.   Endocrine:  Negative for altered temperature regulation, polyphagia, polydipsia, unwanted hair growth and change in facial hair.          Past Medical History:    Past Medical History:   Diagnosis Date     ASCUS of cervix with negative high risk HPV 10/17/2017     Endometrial cancer (H)      HTN (hypertension)      Migraine      Obesity          Past Surgical History:    Past Surgical History:   Procedure Laterality Date     CYSTOSCOPY N/A 12/5/2017    Procedure: CYSTOSCOPY;;  Surgeon: Facundo Parekh MD;  Location: [facility] OR     DAVINCI HYSTERECTOMY TOTAL, BILATERAL  SALPINGO-OOPHORECTOMY, NODE DISSECTION, COMBINED N/A 12/5/2017    Procedure: COMBINED DAVINCI HYSTERECTOMY TOTAL, SALPINGO-OOPHORECTOMY, NODE DISSECTION;  DaVinci Assisted Total Laparoscopic Hysterectomy, Right Salpingo-Oophorectomy, Lysis of Adhesions, Cystoscopy;  Surgeon: Facundo Parekh MD;  Location: [facility] OR     LAPAROSCOPIC HYSTERECTOMY TOTAL  12/2017     oopherectomy Left     13 lb benign mass removal         Health Maintenance Due   Topic Date Due     FOOT EXAM Q1 YEAR  01/29/1959     EYE EXAM Q1 YEAR  01/29/1959     PNEUMOVAX 1X HI RISK PATIENT < 65 (NO IB MSG)  01/29/1960     TETANUS IMMUNIZATION (SYSTEM ASSIGNED)  01/29/1976     MAMMO SCREEN Q2 YR (SYSTEM ASSIGNED)  01/29/2008     COLON CANCER SCREEN (SYSTEM ASSIGNED)  01/29/2008     ADVANCE DIRECTIVE PLANNING Q5 YRS  01/29/2013       Current Medications:     Current Outpatient Prescriptions   Medication Sig Dispense Refill     blood glucose monitoring (VI CONTOUR NEXT) test strip Use to test blood sugar 2 times daily. 100 strip 3     blood glucose monitoring (VI MICROLET) lancets Use to test blood sugar 2 times daily. 100 each 3     lisinopril (PRINIVIL/ZESTRIL) 10 MG tablet Take 1 tablet (10 mg) by mouth daily 90 tablet 1     aspirin 81 MG EC tablet Take 1 tablet (81 mg) by mouth daily 90 tablet 3     metFORMIN (GLUCOPHAGE) 1000 MG tablet Take 1 tablet (1,000 mg) by mouth 2 times daily (with meals) 180 tablet 3     metFORMIN (FORTAMET) 1000 MG 24 hr tablet Take 1,000 mg by mouth daily (with dinner) 30 tablet 0     alcohol swab prep pads Use to swab area of injection/jennifer as directed. 100 each 0     oxyCODONE-acetaminophen (PERCOCET) 5-325 MG per tablet Take 1-2 tablets by mouth every 4 hours as needed for pain (moderate to severe) 30 tablet 0     ibuprofen (ADVIL/MOTRIN) 600 MG tablet Take 1 tablet (600 mg) by mouth every 6 hours as needed for pain (mild) 30 tablet 0     senna-docusate (SENOKOT-S;PERICOLACE) 8.6-50 MG per tablet Take 1-2  tablets by mouth 2 times daily Take while on oral narcotics to prevent or treat constipation. 60 tablet 0         Allergies:        Allergies   Allergen Reactions     Latex Itching        Social History:     Social History   Substance Use Topics     Smoking status: Former Smoker     Packs/day: 0.50     Years: 38.00     Types: Cigarettes     Quit date: 2/17/2017     Smokeless tobacco: Never Used     Alcohol use Yes      Comment: occasionally        History   Drug Use No         Family History:       Family History   Problem Relation Age of Onset     DIABETES Brother      Influenza/Pneumonia Father 62     KIDNEY DISEASE Sister          Physical Exam:     /82  Pulse 60  Temp 97.5  F (36.4  C) (Oral)  Resp 16  Wt 82.2 kg (181 lb 3.5 oz)  SpO2 98%  BMI 32.51 kg/m2  Body mass index is 32.51 kg/(m^2).    General Appearance: healthy and alert, no distress     HEENT: no thyromegaly, no palpable nodules or masses        Cardiovascular: regular rate and rhythm, no gallops, rubs or murmurs     Respiratory: lungs clear, no rales, rhonchi or wheezes, normal diaphragmatic excursion    Musculoskeletal: extremities non tender and without edema    Skin: no lesions or rashes     Neurological: normal gait, no gross defects     Psychiatric: appropriate mood and affect                               Hematological: normal cervical, supraclavicular and inguinal lymph nodes     Gastrointestinal:       abdomen soft, non-tender, non-distended, large hernia which is soft    Genitourinary: External genitalia and urethral meatus appears normal.  Vaginal cuff with red lesion vs granulation tissue mid cuff; biopsy to be done.  Cervix surgically absent.     Prior to the start of the procedure and with procedural staff participation, I verbally confirmed the patient s identity using two indicators, relevant allergies, that the procedure was appropriate and matched the consent or emergent situation, and that the correct equipment/implants  were available. Immediately prior to starting the procedure I conducted the Time Out with the procedural staff and re-confirmed the patient s name, procedure, and site/side. (The Joint Commission universal protocol was followed.)  Yes    Sedation (Moderate or Deep): None    Vaginal cuff biopsy: After discussion of the procedure, patient gave verbal and written consent. Vaginal cuff was cleaned with betadine. A Tischler biopsy was used to excise the thickened skin lesion, which was sent to pathology. Silver nitrate and pressure were applied for hemostasis. Patient tolerated the procedure without complication. Post procedure instructions were given to the patient. Patient's post procedure pain was 0/10.      Assessment:    Shelbi Howard is a 58 year old woman with a diagnosis of stage IA grade 1 endometrioid endometrial adenocarcinoma. She is here today for a surveillance visit.      Total time spent face to face with the patient was 30 minutes. 10 minutes was spent on the procedure. Greater than 50% of the remaining 20 minutes was spent counseling and/or coordinating care as described above.       Plan:     1.)        Discussed updated surveillance recommendations for low grade/risk endometrial cancer per Salani (2017) which includes visits every 6 months x 2 years from surgery and then annually thereafter. Biopsy results from today pending, if not WNL, will have her return sooner. She would like to be seen at [place] as this is closer to her. Reviewed signs and symptoms for when she should contact the clinic or seek additional care. Patient to contact the clinic with any questions or concerns in the interim.     2.) Genetic risk factors were assessed and she had loss of MLH1 and PMS2 expression; her MLH1 promoter methylation was positive which is typically associated with sporadic tumors.     3.) Labs and/or tests ordered include:  Vaginal cuff biopsy.      4.) Health maintenance issues addressed today include  annual health maintenance and non-gynecologic issues with PCP. Briefly reviewed when to seek emergency care for her hernia.     5.)        HTN: follow up with PCP Monday for management.     6.)        Strongly encouraged her to discuss colonoscopy screening with her PCP given her endometrial cancer diagnosis.     BERLIN Conde, WHNP-BC, ANP-BC  Women's Health Nurse Practitioner  Adult Nurse Pracitioner  Division of Gynecologic Oncology          CC  Patient Care Team:  [facility], [facility] as PCP - General      Again, thank you for allowing me to participate in the care of your patient.      Sincerely,    BERLIN Dang CNP

## 2018-03-20 NOTE — LETTER
Date:March 21, 2018      Patient was self referred, no letter generated. Do not send.        [facility] Health Information

## 2018-03-20 NOTE — NURSING NOTE
"Oncology Rooming Note    March 20, 2018 3:14 PM   Shelbi Howard is a 58 year old female who presents for:    Chief Complaint   Patient presents with     Oncology Clinic Visit     Return for Endometrial Ca      Initial Vitals: /82  Pulse 60  Temp 97.5  F (36.4  C) (Oral)  Resp 16  Wt 82.2 kg (181 lb 3.5 oz)  SpO2 98%  BMI 32.51 kg/m2 Estimated body mass index is 32.51 kg/(m^2) as calculated from the following:    Height as of 12/18/17: 1.59 m (5' 2.6\").    Weight as of this encounter: 82.2 kg (181 lb 3.5 oz). Body surface area is 1.91 meters squared.  No Pain (0) Comment: Data Unavailable   No LMP recorded. Patient is postmenopausal.  Allergies reviewed: Yes  Medications reviewed: Yes    Medications: Medication refills not needed today.  Pharmacy name entered into [facility]: [facility] [address] [place] [id number] [facility]    Clinical concerns: results  Amber was notified.    8 minutes for nursing intake (face to face time)     Josette Tejeda MA              "

## 2018-03-20 NOTE — PROGRESS NOTES
Follow Up Notes on Referred Patient    Date: 3/20/2018        RE: Shelbi Howard  : 1960  MARISSA: 3/20/2018      Shelbi Howard is a 58 year old woman with a diagnosis of stage IA grade 1 endometrioid endometrial adenocarcinoma. She is here today for a surveillance visit.     Oncology history:  10/17/17 Presented to PCP with postmenopausal bleeding.  US notable for EMS of 1.1 cm.  Endometrial biopsy with Grade 1 endometrioid adenocarcinoma    17: DaVinci Assisted Total Laparoscopic Hysterectomy, Right Salpingo-Oophorectomy, Lysis of Adhesions, Cystoscopy  SPECIMEN(S):   Cervix, uterus, right ovary fallopian tube     FINAL DIAGNOSIS:   CERVIX, UTERUS, RIGHT OVARY AND FALLOPIAN TUBE, HYSTERECTOMY AND RIGHT   SALPINGO-OOPHORECTOMY:   - Endometrial endometrioid adenocarcinoma, FIGO grade 1, arising in endometrial polyp with atypical hyperplasia   - Myometrium with no significant histologic abnormality   - Cervix with Nabothian cysts   - Ovary and fallopian tube with no significant histologic abnormality           MLH1 Expression:             - Loss of nuclear expression         MSH2 Expression:             - Intact nuclear expression         MSH6 Expression:             - Intact nuclear expression         PMS2 Expression:             - Loss of nuclear expression    MLH1 promoter methylation positive      Today she comes to clinic feeling well and denies any concerning symptoms. She denies any vaginal bleeding, no changes in her bowel or bladder habits, no nausea/emesis, no lower extremity edema, and no difficulties eating or sleeping. She denies any abdominal discomfort/bloating, no fevers or chills, and no chest pain or shortness of breath. She is seeing her PCP Monday for her annual exam; her mammogram is due then as well. She has never had a colonoscopy or other colon cancer screening. She is not sexually active and denies any vaginal dryness. She does check her BP at home and reports readings of  "<140. She states she was told she has had a ventral hernia for \"a long time\".       Review of Systems     Constitutional:  Negative for fever, chills, weight loss, weight gain, fatigue, decreased appetite, night sweats, recent stressors, height gain, height loss, post-operative complications, incisional pain, hallucinations, increased energy, hyperactivity and confused.   HENT:  Negative for ear pain, hearing loss, tinnitus, nosebleeds, trouble swallowing, hoarse voice, mouth sores, sore throat, ear discharge, tooth pain, gum tenderness, taste disturbance, smell disturbance, hearing aid, bleeding gums, dry mouth, sinus pain, sinus congestion and neck mass.    Eyes:  Negative for double vision, pain, redness, eye pain, decreased vision, eye watering, eye bulging, eye dryness, flashing lights, spots, floaters, strabismus, tunnel vision, jaundice and eye irritation.   Respiratory:   Negative for cough, hemoptysis, sputum production, shortness of breath, wheezing, sleep disturbances due to breathing, snores loudly, respiratory pain, dyspnea on exertion, cough disturbing sleep and postural dyspnea.    Cardiovascular:  Negative for chest pain, dyspnea on exertion, palpitations, orthopnea, claudication, leg swelling, fingers/toes turn blue, hypertension, hypotension, syncope, history of heart murmur, chest pain on exertion, chest pain at rest, pacemaker, few scattered varicosities, leg pain, sleep disturbances due to breathing, tachycardia, light-headedness, exercise intolerance and edema.   Gastrointestinal:  Negative for heartburn, nausea, vomiting, abdominal pain, diarrhea, constipation, blood in stool, melena, rectal pain, bloating, hemorrhoids, bowel incontinence, jaundice, rectal bleeding, coffee ground emesis and change in stool.   Genitourinary:  Negative for bladder incontinence, dysuria, urgency, hematuria, flank pain, vaginal discharge, difficulty urinating, genital sores, dyspareunia, decreased libido, " nocturia, voiding less frequently, arousal difficulty, abnormal vaginal bleeding, excessive menstruation, menstrual changes, hot flashes, vaginal dryness and postmenopausal bleeding.   Musculoskeletal:  Negative for myalgias, back pain, joint swelling, arthralgias, stiffness, muscle cramps, neck pain, bone pain, muscle weakness and fracture.   Skin:  Negative for nail changes, itching, poor wound healing, rash, hair changes, skin changes, acne, warts, poor wound healing, scarring, flaky skin, Raynaud's phenomenon, sensitivity to sunlight and skin thickening.   Neurological:  Negative for dizziness, tingling, tremors, speech change, seizures, loss of consciousness, weakness, light-headedness, numbness, headaches, disturbances in coordination, extremity numbness, memory loss, difficulty walking and paralysis.   Endo/Heme:  Negative for anemia, swollen glands and bruises/bleeds easily.   Psychiatric/Behavioral:  Negative for depression, hallucinations, memory loss, decreased concentration, mood swings and panic attacks.    Breast:  Negative for breast discharge, breast mass, breast pain and nipple retraction.   Endocrine:  Negative for altered temperature regulation, polyphagia, polydipsia, unwanted hair growth and change in facial hair.          Past Medical History:    Past Medical History:   Diagnosis Date     ASCUS of cervix with negative high risk HPV 10/17/2017     Endometrial cancer (H)      HTN (hypertension)      Migraine      Obesity          Past Surgical History:    Past Surgical History:   Procedure Laterality Date     CYSTOSCOPY N/A 12/5/2017    Procedure: CYSTOSCOPY;;  Surgeon: Facundo Parekh MD;  Location: [facility] OR     DAVINCI HYSTERECTOMY TOTAL, BILATERAL SALPINGO-OOPHORECTOMY, NODE DISSECTION, COMBINED N/A 12/5/2017    Procedure: COMBINED DAVINCI HYSTERECTOMY TOTAL, SALPINGO-OOPHORECTOMY, NODE DISSECTION;  DaVinci Assisted Total Laparoscopic Hysterectomy, Right Salpingo-Oophorectomy, Lysis of  Adhesions, Cystoscopy;  Surgeon: Facundo aPrekh MD;  Location: [facility] OR     LAPAROSCOPIC HYSTERECTOMY TOTAL  12/2017     oopherectomy Left     13 lb benign mass removal         Health Maintenance Due   Topic Date Due     FOOT EXAM Q1 YEAR  01/29/1959     EYE EXAM Q1 YEAR  01/29/1959     PNEUMOVAX 1X HI RISK PATIENT < 65 (NO IB MSG)  01/29/1960     TETANUS IMMUNIZATION (SYSTEM ASSIGNED)  01/29/1976     MAMMO SCREEN Q2 YR (SYSTEM ASSIGNED)  01/29/2008     COLON CANCER SCREEN (SYSTEM ASSIGNED)  01/29/2008     ADVANCE DIRECTIVE PLANNING Q5 YRS  01/29/2013       Current Medications:     Current Outpatient Prescriptions   Medication Sig Dispense Refill     blood glucose monitoring ([employer] CONTOUR NEXT) test strip Use to test blood sugar 2 times daily. 100 strip 3     blood glucose monitoring (VI MICROLET) lancets Use to test blood sugar 2 times daily. 100 each 3     lisinopril (PRINIVIL/ZESTRIL) 10 MG tablet Take 1 tablet (10 mg) by mouth daily 90 tablet 1     aspirin 81 MG EC tablet Take 1 tablet (81 mg) by mouth daily 90 tablet 3     metFORMIN (GLUCOPHAGE) 1000 MG tablet Take 1 tablet (1,000 mg) by mouth 2 times daily (with meals) 180 tablet 3     metFORMIN (FORTAMET) 1000 MG 24 hr tablet Take 1,000 mg by mouth daily (with dinner) 30 tablet 0     alcohol swab prep pads Use to swab area of injection/jennifer as directed. 100 each 0     oxyCODONE-acetaminophen (PERCOCET) 5-325 MG per tablet Take 1-2 tablets by mouth every 4 hours as needed for pain (moderate to severe) 30 tablet 0     ibuprofen (ADVIL/MOTRIN) 600 MG tablet Take 1 tablet (600 mg) by mouth every 6 hours as needed for pain (mild) 30 tablet 0     senna-docusate (SENOKOT-S;PERICOLACE) 8.6-50 MG per tablet Take 1-2 tablets by mouth 2 times daily Take while on oral narcotics to prevent or treat constipation. 60 tablet 0         Allergies:        Allergies   Allergen Reactions     Latex Itching        Social History:     Social History   Substance Use Topics      Smoking status: Former Smoker     Packs/day: 0.50     Years: 38.00     Types: Cigarettes     Quit date: 2/17/2017     Smokeless tobacco: Never Used     Alcohol use Yes      Comment: occasionally        History   Drug Use No         Family History:       Family History   Problem Relation Age of Onset     DIABETES Brother      Influenza/Pneumonia Father 62     KIDNEY DISEASE Sister          Physical Exam:     /82  Pulse 60  Temp 97.5  F (36.4  C) (Oral)  Resp 16  Wt 82.2 kg (181 lb 3.5 oz)  SpO2 98%  BMI 32.51 kg/m2  Body mass index is 32.51 kg/(m^2).    General Appearance: healthy and alert, no distress     HEENT: no thyromegaly, no palpable nodules or masses        Cardiovascular: regular rate and rhythm, no gallops, rubs or murmurs     Respiratory: lungs clear, no rales, rhonchi or wheezes, normal diaphragmatic excursion    Musculoskeletal: extremities non tender and without edema    Skin: no lesions or rashes     Neurological: normal gait, no gross defects     Psychiatric: appropriate mood and affect                               Hematological: normal cervical, supraclavicular and inguinal lymph nodes     Gastrointestinal:       abdomen soft, non-tender, non-distended, large hernia which is soft    Genitourinary: External genitalia and urethral meatus appears normal.  Vaginal cuff with red lesion vs granulation tissue mid cuff; biopsy to be done.  Cervix surgically absent.     Prior to the start of the procedure and with procedural staff participation, I verbally confirmed the patient s identity using two indicators, relevant allergies, that the procedure was appropriate and matched the consent or emergent situation, and that the correct equipment/implants were available. Immediately prior to starting the procedure I conducted the Time Out with the procedural staff and re-confirmed the patient s name, procedure, and site/side. (The Joint Commission universal protocol was followed.)  Yes    Sedation  (Moderate or Deep): None    Vaginal cuff biopsy: After discussion of the procedure, patient gave verbal and written consent. Vaginal cuff was cleaned with betadine. A Tischler biopsy was used to excise the thickened skin lesion, which was sent to pathology. Silver nitrate and pressure were applied for hemostasis. Patient tolerated the procedure without complication. Post procedure instructions were given to the patient. Patient's post procedure pain was 0/10.      Assessment:    Shelbi Howard is a 58 year old woman with a diagnosis of stage IA grade 1 endometrioid endometrial adenocarcinoma. She is here today for a surveillance visit.      Total time spent face to face with the patient was 30 minutes. 10 minutes was spent on the procedure. Greater than 50% of the remaining 20 minutes was spent counseling and/or coordinating care as described above.       Plan:     1.)        Discussed updated surveillance recommendations for low grade/risk endometrial cancer per Tiff (2017) which includes visits every 6 months x 2 years from surgery and then annually thereafter. Biopsy results from today pending, if not WNL, will have her return sooner. She would like to be seen at [place] as this is closer to her. Reviewed signs and symptoms for when she should contact the clinic or seek additional care. Patient to contact the clinic with any questions or concerns in the interim.     2.) Genetic risk factors were assessed and she had loss of MLH1 and PMS2 expression; her MLH1 promoter methylation was positive which is typically associated with sporadic tumors.     3.) Labs and/or tests ordered include:  Vaginal cuff biopsy.      4.) Health maintenance issues addressed today include annual health maintenance and non-gynecologic issues with PCP. Briefly reviewed when to seek emergency care for her hernia.     5.)        HTN: follow up with PCP Monday for management.     6.)        Strongly encouraged her to discuss colonoscopy  screening with her PCP given her endometrial cancer diagnosis.     BERLIN Conde, WHNP-BC, ANP-BC  Women's Health Nurse Practitioner  Adult Nurse Pracitioner  Division of Gynecologic Oncology          CC  Patient Care Team:  Clinic, [facility] [facility] as PCP - General

## 2018-03-20 NOTE — MR AVS SNAPSHOT
After Visit Summary   3/20/2018    Shelbi Howard    MRN: [id number]           Patient Information     Date Of Birth          1960        Visit Information        Provider Department      3/20/2018 3:30 PM Michelle Clark APRN CNP [facility]        Today's Diagnoses     Endometrial carcinoma (H)    -  1       Follow-ups after your visit        Follow-up notes from your care team     Return in about 6 months (around 9/20/2018).      Your next 10 appointments already scheduled     Mar 26, 2018  5:10 PM CDT   [facility] BERLIN Ramos CNP   [facility]    [address] [place] [id number] [phone]            Sep 12, 2018  2:00 PM CDT   Return Visit with BERLIN Dang CNP   [facility]    [address] [place] [id number] [phone]              Who to contact     If you have questions or need follow up information about today's clinic visit or your schedule please contact [facility] directly at [phone].  Normal or non-critical lab and imaging results will be communicated to you by MyChart, letter or phone within 4 business days after the clinic has received the results. If you do not hear from us within 7 days, please contact the clinic through [employer] or phone. If you have a critical or abnormal lab result, we will notify you by phone as soon as possible.  Submit refill requests through [employer] or call your pharmacy and they will forward the refill request to us. Please allow 3 business days for your refill to be completed.          Additional Information About Your Visit        MyChart Information     [employer] gives you secure access to your electronic health record. If you see a primary care provider, you can also send messages to your care team and make appointments. If you have  questions, please call your primary care clinic.  If you do not have a primary care provider, please call [phone] and they will assist you.        Care EveryWhere ID     This is your Care EveryWhere ID. This could be used by other organizations to access your [place] medical records  [id number]        Your Vitals Were     Pulse Temperature Respirations Pulse Oximetry BMI (Body Mass Index)       60 97.5  F (36.4  C) (Oral) 16 98% 32.51 kg/m2        Blood Pressure from Last 3 Encounters:   03/20/18 162/82   12/18/17 159/81   12/08/17 122/70    Weight from Last 3 Encounters:   03/20/18 82.2 kg (181 lb 3.5 oz)   12/18/17 82.3 kg (181 lb 6.4 oz)   12/08/17 83.3 kg (183 lb 9.6 oz)              We Performed the Following     Biopsy of Vagina     Surgical pathology exam        Primary Care Provider Office Phone # Fax #    [facility] [phone]       [address] [place] [id number]        Equal Access to Services     RUBY THORPE : Hadii sydnee ku hadasho Sohugh, waaxda luqadaha, qaybta kaalmada ademariano, favian richardson . So [facility] [phone].    ATENCIÓN: Si habla español, tiene a hagen disposición servicios gratuitos de asistencia lingüística. [facility] [phone].    We comply with applicable federal civil rights laws and [place] laws. We do not discriminate on the basis of race, color, national origin, age, disability, sex, sexual orientation, or gender identity.            Thank you!     Thank you for choosing [facility]  for your care. Our goal is always to provide you with excellent care. Hearing back from our patients is one way we can continue to improve our services. Please take a few minutes to complete the written survey that you may receive in the mail after your visit with us. Thank you!             Your Updated Medication List - Protect others around you: Learn how to safely use, store and throw away your medicines at  www.disposemymeds.org.          This list is accurate as of 3/20/18  3:57 PM.  Always use your most recent med list.                   Brand Name Dispense Instructions for use Diagnosis    alcohol swab prep pads     100 each    Use to swab area of injection/jennifer as directed.    Type 2 diabetes mellitus without complication, unspecified long term insulin use status (H)       aspirin 81 MG EC tablet     90 tablet    Take 1 tablet (81 mg) by mouth daily    Type 2 diabetes mellitus with hyperosmolarity without coma, without long-term current use of insulin (H)       blood glucose monitoring lancets     100 each    Use to test blood sugar 2 times daily.    Type 2 diabetes mellitus without complication, unspecified long term insulin use status (H)       blood glucose monitoring test strip    VI CONTOUR NEXT    100 strip    Use to test blood sugar 2 times daily.    Type 2 diabetes mellitus without complication, unspecified long term insulin use status (H)       ibuprofen 600 MG tablet    ADVIL/MOTRIN    30 tablet    Take 1 tablet (600 mg) by mouth every 6 hours as needed for pain (mild)    S/P hysterectomy       lisinopril 10 MG tablet    PRINIVIL/ZESTRIL    90 tablet    Take 1 tablet (10 mg) by mouth daily    Type 2 diabetes mellitus with hyperosmolarity without coma, without long-term current use of insulin (H)       * metFORMIN osmotic 1000 MG 24 hr tablet    FORTAMET    30 tablet    Take 1,000 mg by mouth daily (with dinner)    Type 2 diabetes mellitus without complication, unspecified long term insulin use status (H)       * metFORMIN 1000 MG tablet    GLUCOPHAGE    180 tablet    Take 1 tablet (1,000 mg) by mouth 2 times daily (with meals)    New onset type 2 diabetes mellitus (H), Type 2 diabetes mellitus with hyperosmolarity without coma, without long-term current use of insulin (H)       oxyCODONE-acetaminophen 5-325 MG per tablet    PERCOCET    30 tablet    Take 1-2 tablets by mouth every 4 hours as needed for  pain (moderate to severe)    S/P hysterectomy       senna-docusate 8.6-50 MG per tablet    SENOKOT-S;PERICOLACE    60 tablet    Take 1-2 tablets by mouth 2 times daily Take while on oral narcotics to prevent or treat constipation.    S/P hysterectomy       * Notice:  This list has 2 medication(s) that are the same as other medications prescribed for you. Read the directions carefully, and ask your doctor or other care provider to review them with you.

## 2018-03-21 NOTE — PROGRESS NOTES
SUBJECTIVE:   Shelbi Howard is a 58 year old female who presents to clinic today for the following health issues:      HPI  Diabetes Follow-up    Patient is checking blood sugars: twice daily.    Blood sugar testing frequency justification: Uncontrolled diabetes  Results are as follows:         am - 130s         suppertime - 112-115    Diabetic concerns: None     Symptoms of hypoglycemia (low blood sugar): none     Paresthesias (numbness or burning in feet) or sores: No     Date of last diabetic eye exam: Feb 2018    BP Readings from Last 2 Encounters:   03/20/18 162/82   12/18/17 159/81     Hemoglobin A1C (%)   Date Value   12/05/2017 9.0 (H)     LDL Cholesterol Calculated (mg/dL)   Date Value   12/08/2017 54     Problem list and histories reviewed & adjusted, as indicated.  Pneumonia vaccine will check with insurance.   Went to optometrist in 2/2018 for diabetic eye exam. Moriah Hudson.   Denies side effects to medications.   Discussed adding statin medication last LDL 54     Additional history: as documented    Patient Active Problem List   Diagnosis     Post-menopausal bleeding     ASCUS of cervix with negative high risk HPV     Endometrial carcinoma (H)     S/P hysterectomy     New onset type 2 diabetes mellitus (H)     Type 2 diabetes mellitus with hyperosmolarity without coma, without long-term current use of insulin (H)     Past Surgical History:   Procedure Laterality Date     CYSTOSCOPY N/A 12/5/2017    Procedure: CYSTOSCOPY;;  Surgeon: Facundo Parekh MD;  Location: [facility] OR     DAVINCI HYSTERECTOMY TOTAL, BILATERAL SALPINGO-OOPHORECTOMY, NODE DISSECTION, COMBINED N/A 12/5/2017    Procedure: COMBINED DAVINCI HYSTERECTOMY TOTAL, SALPINGO-OOPHORECTOMY, NODE DISSECTION;  DaVinci Assisted Total Laparoscopic Hysterectomy, Right Salpingo-Oophorectomy, Lysis of Adhesions, Cystoscopy;  Surgeon: Facundo Parekh MD;  Location: [facility] OR     LAPAROSCOPIC HYSTERECTOMY TOTAL  12/2017     oopherectomy Left      13 lb benign mass removal       Social History   Substance Use Topics     Smoking status: Former Smoker     Packs/day: 0.50     Years: 38.00     Types: Cigarettes     Quit date: 2/17/2017     Smokeless tobacco: Never Used     Alcohol use Yes      Comment: occasionally      Family History   Problem Relation Age of Onset     DIABETES Brother      Influenza/Pneumonia Father 62     KIDNEY DISEASE Sister          Current Outpatient Prescriptions   Medication Sig Dispense Refill     simvastatin (ZOCOR) 10 MG tablet Take 1 tablet (10 mg) by mouth At Bedtime 90 tablet 1     blood glucose monitoring (VI CONTOUR NEXT) test strip Use to test blood sugar 2 times daily. 100 strip 3     blood glucose monitoring (VI MICROLET) lancets Use to test blood sugar 2 times daily. 100 each 3     lisinopril (PRINIVIL/ZESTRIL) 10 MG tablet Take 1 tablet (10 mg) by mouth daily 90 tablet 1     aspirin 81 MG EC tablet Take 1 tablet (81 mg) by mouth daily 90 tablet 3     metFORMIN (GLUCOPHAGE) 1000 MG tablet Take 1 tablet (1,000 mg) by mouth 2 times daily (with meals) 180 tablet 3     metFORMIN (FORTAMET) 1000 MG 24 hr tablet Take 1,000 mg by mouth daily (with dinner) 30 tablet 0     alcohol swab prep pads Use to swab area of injection/jennifer as directed. 100 each 0     ibuprofen (ADVIL/MOTRIN) 600 MG tablet Take 1 tablet (600 mg) by mouth every 6 hours as needed for pain (mild) 30 tablet 0     Allergies   Allergen Reactions     Latex Itching     Recent Labs   Lab Test  03/26/18   1752  12/08/17   1458  12/06/17   0555  12/05/17 2003 11/20/17   1222   A1C  6.4*   --    --   9.0*   --    LDL   --   54   --    --    --    HDL   --   46*   --    --    --    TRIG   --   125   --    --    --    CR   --    --   0.78   --   0.65   GFRESTIMATED   --    --   75   --   >90   GFRESTBLACK   --    --   >90   --   >90   POTASSIUM   --    --   4.3   --   4.2   TSH   --   0.36*   --    --    --       BP Readings from Last 3 Encounters:   03/26/18  "142/86   03/20/18 162/82   12/18/17 159/81    Wt Readings from Last 3 Encounters:   03/26/18 180 lb (81.6 kg)   03/20/18 181 lb 3.5 oz (82.2 kg)   12/18/17 181 lb 6.4 oz (82.3 kg)                  Labs reviewed in EPIC    ROS:  Constitutional, HEENT, cardiovascular, pulmonary, gi and gu systems are negative, except as otherwise noted.    OBJECTIVE:     /86 (BP Location: Left arm, Patient Position: Chair, Cuff Size: Adult Large)  Pulse 78  Temp 97.7  F (36.5  C) (Oral)  Resp 18  Ht 5' 2.5\" (1.588 m)  Wt 180 lb (81.6 kg)  BMI 32.4 kg/m2  Body mass index is 32.4 kg/(m^2).  GENERAL: healthy, alert and no distress  EYES: Eyes grossly normal to inspection, PERRL and conjunctivae and sclerae normal  HENT: ear canals and TM's normal, nose and mouth without ulcers or lesions  NECK: no adenopathy, no asymmetry, masses, or scars and thyroid normal to palpation  RESP: lungs clear to auscultation - no rales, rhonchi or wheezes  CV: regular rate and rhythm, normal S1 S2, no S3 or S4, no murmur, click or rub, no peripheral edema and peripheral pulses strong  ABDOMEN: soft, nontender, no hepatosplenomegaly, no masses and bowel sounds normal  MS: no gross musculoskeletal defects noted, no edema  SKIN: no suspicious lesions or rashes  NEURO: Normal strength and tone, mentation intact and speech normal  PSYCH: mentation appears normal, affect normal/bright  Diabetic foot exam: normal DP and PT pulses, no trophic changes or ulcerative lesions and normal sensory exam    ASSESSMENT/PLAN:     1. Type 2 diabetes mellitus with hyperosmolarity without coma, without long-term current use of insulin (H)  Recommend starting statin medication  - simvastatin (ZOCOR) 10 MG tablet; Take 1 tablet (10 mg) by mouth At Bedtime  Dispense: 90 tablet; Refill: 1  - **A1C FUTURE 3mo  - **Basic metabolic panel FUTURE anytime    2. Screen for colon cancer  referal place  - GASTROENTEROLOGY ADULT REF PROCEDURE ONLY [facility] [phone]; " [facility] GI Provider    3. Visit for screening mammogram  Referral placed  - MA SCREENING DIGITAL BILAT - Future  (s+30); Future    4. Screening for diabetic peripheral neuropathy  Foot exam completed  - FOOT EXAM  NO CHARGE [90299.884]    5. Need for prophylactic vaccination against Streptococcus pneumoniae (pneumococcus)  Information given for patient to review she is going to call her insurance company to see if this will be covered for her.    6. Need for prophylactic vaccination with tetanus-diphtheria (TD)  Given  - TDAP VACCINE (ADACEL)    7. New onset type 2 diabetes mellitus (H)  Recheck of lab  - **A1C FUTURE 3mo  - **Basic metabolic panel FUTURE anytime    8. Subclinical hypothyroidism  Recheck of lab  - TSH with free T4 reflex    Patient Instructions     Pneumococcal Vaccination  There are 2 pneumococcal vaccinations that protect people against pneumococcal disease.  Pneumococcal disease  Pneumococcal disease is caused by bacteria (Streptococcus pneumoniae). This germ is easily spread when someone with the bacteria coughs, sneezes, laughs, or talks. You can get pneumococcal disease more than once. This is because there are many different types (strains) of the bacteria. Some strains are also resistant to treatment with antibiotics.  There are different kinds of pneumococcal disease, depending on what part of the body is infected. They include:    Pneumonia. Infection in the lungs.    Meningitis. Infection of the covering of the brain and spinal cord.    Otitis media. Infection of the middle ear.    Bacteremia or septicemia. Infection in the blood.  Pneumococcal disease can be life-threatening, especially for people in high-risk groups. Each year, thousands of people die of this disease. Thousands more become seriously ill.  The vaccine    The pneumococcal vaccines are the best way to avoid pneumococcal disease. They are safe and effective. The vaccines are given as shots (injections). This can be done  at your healthcare provider's office or a health clinic. Drugstores, senior centers, and workplaces often offer vaccinations, too. If you have questions, ask your healthcare provider.  The pneumococcal vaccines are recommended for:    Persons 65 and older    Infants    People with chronic health problems (such as diabetes, chronic lung or heart disease, liver disease)    People who have a cochlear implant    People who have weakened immune systems    People who live in nursing homes or other long-term care facilities    People who smoke or have asthma  They are given:    In a 4-dose series in infants    One time in adults; some people need a second dose of one of the vaccines  Your healthcare provider can tell you more about the vaccines, whether you should get them, and the number of shots you should get.  Date Last Reviewed: 11/1/2016 2000-2017 [employer]. [address] [place] [address], [place] [address]. All rights reserved. This information is not intended as a substitute for professional medical care. Always follow your healthcare professional's instructions.      I will my chart you with your lab results and any further treatment as indicated.     Thank you  Halie Hernandes [facility]

## 2018-03-23 LAB — COPATH REPORT: NORMAL

## 2018-03-26 ENCOUNTER — OFFICE VISIT (OUTPATIENT)
Dept: FAMILY MEDICINE | Facility: OTHER | Age: 58
End: 2018-03-26
Payer: COMMERCIAL

## 2018-03-26 VITALS
DIASTOLIC BLOOD PRESSURE: 86 MMHG | HEART RATE: 78 BPM | WEIGHT: 180 LBS | RESPIRATION RATE: 18 BRPM | TEMPERATURE: 97.7 F | BODY MASS INDEX: 31.89 KG/M2 | SYSTOLIC BLOOD PRESSURE: 142 MMHG | HEIGHT: 63 IN

## 2018-03-26 DIAGNOSIS — Z12.31 VISIT FOR SCREENING MAMMOGRAM: ICD-10-CM

## 2018-03-26 DIAGNOSIS — Z23 NEED FOR PROPHYLACTIC VACCINATION AGAINST STREPTOCOCCUS PNEUMONIAE (PNEUMOCOCCUS): ICD-10-CM

## 2018-03-26 DIAGNOSIS — Z13.89 SCREENING FOR DIABETIC PERIPHERAL NEUROPATHY: ICD-10-CM

## 2018-03-26 DIAGNOSIS — E11.00 TYPE 2 DIABETES MELLITUS WITH HYPEROSMOLARITY WITHOUT COMA, WITHOUT LONG-TERM CURRENT USE OF INSULIN (H): Primary | ICD-10-CM

## 2018-03-26 DIAGNOSIS — Z23 NEED FOR PROPHYLACTIC VACCINATION WITH TETANUS-DIPHTHERIA (TD): ICD-10-CM

## 2018-03-26 DIAGNOSIS — E03.8 SUBCLINICAL HYPOTHYROIDISM: ICD-10-CM

## 2018-03-26 DIAGNOSIS — E11.9 NEW ONSET TYPE 2 DIABETES MELLITUS (H): ICD-10-CM

## 2018-03-26 DIAGNOSIS — Z12.11 SCREEN FOR COLON CANCER: ICD-10-CM

## 2018-03-26 LAB
ANION GAP SERPL CALCULATED.3IONS-SCNC: 11 MMOL/L (ref 3–14)
BUN SERPL-MCNC: 25 MG/DL (ref 7–30)
CALCIUM SERPL-MCNC: 8.9 MG/DL (ref 8.5–10.1)
CHLORIDE SERPL-SCNC: 105 MMOL/L (ref 94–109)
CO2 SERPL-SCNC: 24 MMOL/L (ref 20–32)
CREAT SERPL-MCNC: 0.59 MG/DL (ref 0.52–1.04)
GFR SERPL CREATININE-BSD FRML MDRD: >90 ML/MIN/1.7M2
GLUCOSE SERPL-MCNC: 128 MG/DL (ref 70–99)
HBA1C MFR BLD: 6.4 % (ref 4.3–6)
POTASSIUM SERPL-SCNC: 3.9 MMOL/L (ref 3.4–5.3)
SODIUM SERPL-SCNC: 140 MMOL/L (ref 133–144)
TSH SERPL DL<=0.005 MIU/L-ACNC: 0.53 MU/L (ref 0.4–4)

## 2018-03-26 PROCEDURE — 84443 ASSAY THYROID STIM HORMONE: CPT | Performed by: NURSE PRACTITIONER

## 2018-03-26 PROCEDURE — 99214 OFFICE O/P EST MOD 30 MIN: CPT | Mod: 25 | Performed by: NURSE PRACTITIONER

## 2018-03-26 PROCEDURE — 36415 COLL VENOUS BLD VENIPUNCTURE: CPT | Performed by: NURSE PRACTITIONER

## 2018-03-26 PROCEDURE — 80048 BASIC METABOLIC PNL TOTAL CA: CPT | Performed by: NURSE PRACTITIONER

## 2018-03-26 PROCEDURE — 99207 C FOOT EXAM  NO CHARGE: CPT | Mod: 25 | Performed by: NURSE PRACTITIONER

## 2018-03-26 PROCEDURE — 90471 IMMUNIZATION ADMIN: CPT | Performed by: NURSE PRACTITIONER

## 2018-03-26 PROCEDURE — 83036 HEMOGLOBIN GLYCOSYLATED A1C: CPT | Performed by: NURSE PRACTITIONER

## 2018-03-26 PROCEDURE — 90715 TDAP VACCINE 7 YRS/> IM: CPT | Performed by: NURSE PRACTITIONER

## 2018-03-26 RX ORDER — SIMVASTATIN 10 MG
10 TABLET ORAL AT BEDTIME
Qty: 90 TABLET | Refills: 1 | Status: SHIPPED | OUTPATIENT
Start: 2018-03-26 | End: 2018-09-04

## 2018-03-26 NOTE — MR AVS SNAPSHOT
After Visit Summary   3/26/2018    Shelbi Howard    MRN: [id number]           Patient Information     Date Of Birth          1960        Visit Information        Provider Department      3/26/2018 5:10 PM Halie Hernandes APRN [facility]        Today's Diagnoses     Type 2 diabetes mellitus with hyperosmolarity without coma, without long-term current use of insulin (H)    -  1    Screen for colon cancer        Visit for screening mammogram        Screening for diabetic peripheral neuropathy        Need for prophylactic vaccination against Streptococcus pneumoniae (pneumococcus)        Need for prophylactic vaccination with tetanus-diphtheria (TD)        New onset type 2 diabetes mellitus (H)        Subclinical hypothyroidism          Care Instructions      Pneumococcal Vaccination  There are 2 pneumococcal vaccinations that protect people against pneumococcal disease.  Pneumococcal disease  Pneumococcal disease is caused by bacteria (Streptococcus pneumoniae). This germ is easily spread when someone with the bacteria coughs, sneezes, laughs, or talks. You can get pneumococcal disease more than once. This is because there are many different types (strains) of the bacteria. Some strains are also resistant to treatment with antibiotics.  There are different kinds of pneumococcal disease, depending on what part of the body is infected. They include:    Pneumonia. Infection in the lungs.    Meningitis. Infection of the covering of the brain and spinal cord.    Otitis media. Infection of the middle ear.    Bacteremia or septicemia. Infection in the blood.  Pneumococcal disease can be life-threatening, especially for people in high-risk groups. Each year, thousands of people die of this disease. Thousands more become seriously ill.  The vaccine    The pneumococcal vaccines are the best way to avoid pneumococcal disease. They are safe and effective. The vaccines are given as  shots (injections). This can be done at your healthcare provider's office or a health clinic. Drugstores, senior centers, and workplaces often offer vaccinations, too. If you have questions, ask your healthcare provider.  The pneumococcal vaccines are recommended for:    Persons 65 and older    Infants    People with chronic health problems (such as diabetes, chronic lung or heart disease, liver disease)    People who have a cochlear implant    People who have weakened immune systems    People who live in nursing homes or other long-term care facilities    People who smoke or have asthma  They are given:    In a 4-dose series in infants    One time in adults; some people need a second dose of one of the vaccines  Your healthcare provider can tell you more about the vaccines, whether you should get them, and the number of shots you should get.  Date Last Reviewed: 11/1/2016 2000-2017 [employer]. [address] [place] [address]. All rights reserved. This information is not intended as a substitute for professional medical care. Always follow your healthcare professional's instructions.      I will my chart you with your lab results and any further treatment as indicated.     Thank you  Halie Hernandes CNP            Follow-ups after your visit        Additional Services     GASTROENTEROLOGY ADULT REF PROCEDURE ONLY [facility] [phone]; [facility] GI Provider       Last Lab Result: Creatinine (mg/dL)       Date                     Value                 12/06/2017               0.78             ----------  There is no height or weight on file to calculate BMI.     Needed:  No  Language:  English    Patient will be contacted to schedule procedure.     Please be aware that coverage of these services is subject to the terms and limitations of your health insurance plan.  Call member services at your health plan with any benefit or coverage questions.  Any procedures must  be performed at a [facility] OR coordinated by your clinic's referral office.    Please bring the following with you to your appointment:    (1) Any X-Rays, CTs or MRIs which have been performed.  Contact the facility where they were done to arrange for  prior to your scheduled appointment.    (2) List of current medications   (3) This referral request   (4) Any documents/labs given to you for this referral                  Your next 10 appointments already scheduled     Sep 12, 2018  2:00 PM CDT   Return Visit with BERLIN Dang CNP   [facility]    [id number] [place] [address] [id number] [phone]              Future tests that were ordered for you today     Open Future Orders        Priority Expected Expires Ordered    MA SCREENING DIGITAL BILAT - Future  (s+30) Routine  3/21/2019 3/26/2018            Who to contact     If you have questions or need follow up information about today's clinic visit or your schedule please contact [facility] directly at [phone].  Normal or non-critical lab and imaging results will be communicated to you by [employer], letter or phone within 4 business days after the clinic has received the results. If you do not hear from us within 7 days, please contact the clinic through [employer] or phone. If you have a critical or abnormal lab result, we will notify you by phone as soon as possible.  Submit refill requests through [employer] or call your pharmacy and they will forward the refill request to us. Please allow 3 business days for your refill to be completed.          Additional Information About Your Visit        [employer] Information     [employer] gives you secure access to your electronic health record. If you see a primary care provider, you can also send messages to your care team and make appointments. If you have questions, please call your primary care clinic.  If you do not have  "a primary care provider, please call [phone] and they will assist you.        Care EveryWhere ID     This is your Care EveryWhere ID. This could be used by other organizations to access your [place] medical records  [id number]        Your Vitals Were     Pulse Temperature Respirations Height BMI (Body Mass Index)       78 97.7  F (36.5  C) (Oral) 18 5' 2.5\" (1.588 m) 32.4 kg/m2        Blood Pressure from Last 3 Encounters:   03/26/18 142/86   03/20/18 162/82   12/18/17 159/81    Weight from Last 3 Encounters:   03/26/18 180 lb (81.6 kg)   03/20/18 181 lb 3.5 oz (82.2 kg)   12/18/17 181 lb 6.4 oz (82.3 kg)              We Performed the Following     **A1C FUTURE 3mo     **Basic metabolic panel FUTURE anytime     FOOT EXAM  NO CHARGE [77599.114]     GASTROENTEROLOGY ADULT REF PROCEDURE ONLY [facility] [phone]; [facility] GI Provider     TDAP VACCINE (ADACEL)     TSH with free T4 reflex          Today's Medication Changes          These changes are accurate as of 3/26/18  5:44 PM.  If you have any questions, ask your nurse or doctor.               Start taking these medicines.        Dose/Directions    simvastatin 10 MG tablet   Commonly known as:  ZOCOR   Used for:  Type 2 diabetes mellitus with hyperosmolarity without coma, without long-term current use of insulin (H)   Started by:  Halie Hernandes APRN CNP        Dose:  10 mg   Take 1 tablet (10 mg) by mouth At Bedtime   Quantity:  90 tablet   Refills:  1            Where to get your medicines      These medications were sent to [facility] [address] [place] [address] [facility]  [address] [facility] [address]     Phone:  [phone]     simvastatin 10 MG tablet                Primary Care Provider Office Phone # Fax #    [facility] [phone]       [address] [facility] [address]        Equal Access to Services     RUBY THORPE AH: aliyah Gentile, " nathalie elizabethsaroj aristidesfavian quintana kwakuin hayaan adeeg kharash la'aan ah. So [facility] [phone].    ATENCIÓN: Si abraham hightower, tiene a hagen disposición servicios gratuitos de asistencia lingüística. Latrice al [phone].    We comply with applicable federal civil rights laws and [place] laws. We do not discriminate on the basis of race, color, national origin, age, disability, sex, sexual orientation, or gender identity.            Thank you!     Thank you for choosing [facility]  for your care. Our goal is always to provide you with excellent care. Hearing back from our patients is one way we can continue to improve our services. Please take a few minutes to complete the written survey that you may receive in the mail after your visit with us. Thank you!             Your Updated Medication List - Protect others around you: Learn how to safely use, store and throw away your medicines at www.disposemymeds.org.          This list is accurate as of 3/26/18  5:44 PM.  Always use your most recent med list.                   Brand Name Dispense Instructions for use Diagnosis    alcohol swab prep pads     100 each    Use to swab area of injection/jennifer as directed.    Type 2 diabetes mellitus without complication, unspecified long term insulin use status (H)       aspirin 81 MG EC tablet     90 tablet    Take 1 tablet (81 mg) by mouth daily    Type 2 diabetes mellitus with hyperosmolarity without coma, without long-term current use of insulin (H)       blood glucose monitoring lancets     100 each    Use to test blood sugar 2 times daily.    Type 2 diabetes mellitus without complication, unspecified long term insulin use status (H)       blood glucose monitoring test strip    VI CONTOUR NEXT    100 strip    Use to test blood sugar 2 times daily.    Type 2 diabetes mellitus without complication, unspecified long term insulin use status (H)       ibuprofen 600 MG tablet    ADVIL/MOTRIN    30 tablet    Take 1  tablet (600 mg) by mouth every 6 hours as needed for pain (mild)    S/P hysterectomy       lisinopril 10 MG tablet    PRINIVIL/ZESTRIL    90 tablet    Take 1 tablet (10 mg) by mouth daily    Type 2 diabetes mellitus with hyperosmolarity without coma, without long-term current use of insulin (H)       * metFORMIN osmotic 1000 MG 24 hr tablet    FORTAMET    30 tablet    Take 1,000 mg by mouth daily (with dinner)    Type 2 diabetes mellitus without complication, unspecified long term insulin use status (H)       * metFORMIN 1000 MG tablet    GLUCOPHAGE    180 tablet    Take 1 tablet (1,000 mg) by mouth 2 times daily (with meals)    New onset type 2 diabetes mellitus (H), Type 2 diabetes mellitus with hyperosmolarity without coma, without long-term current use of insulin (H)       simvastatin 10 MG tablet    ZOCOR    90 tablet    Take 1 tablet (10 mg) by mouth At Bedtime    Type 2 diabetes mellitus with hyperosmolarity without coma, without long-term current use of insulin (H)       * Notice:  This list has 2 medication(s) that are the same as other medications prescribed for you. Read the directions carefully, and ask your doctor or other care provider to review them with you.

## 2018-03-26 NOTE — NURSING NOTE
"Chief Complaint   Patient presents with     RECHECK     Panel Management     nereyda, height, tdap, pneumovax, mammo, colon cancer, honoring choices, DM eye and foot        Initial /86 (BP Location: Left arm, Patient Position: Chair, Cuff Size: Adult Large)  Pulse 78  Temp 97.7  F (36.5  C) (Oral)  Resp 18  Ht 5' 2.5\" (1.588 m)  Wt 180 lb (81.6 kg)  BMI 32.4 kg/m2 Estimated body mass index is 32.4 kg/(m^2) as calculated from the following:    Height as of this encounter: 5' 2.5\" (1.588 m).    Weight as of this encounter: 180 lb (81.6 kg).  Medication Reconciliation: complete    "

## 2018-03-26 NOTE — PATIENT INSTRUCTIONS
Pneumococcal Vaccination  There are 2 pneumococcal vaccinations that protect people against pneumococcal disease.  Pneumococcal disease  Pneumococcal disease is caused by bacteria (Streptococcus pneumoniae). This germ is easily spread when someone with the bacteria coughs, sneezes, laughs, or talks. You can get pneumococcal disease more than once. This is because there are many different types (strains) of the bacteria. Some strains are also resistant to treatment with antibiotics.  There are different kinds of pneumococcal disease, depending on what part of the body is infected. They include:    Pneumonia. Infection in the lungs.    Meningitis. Infection of the covering of the brain and spinal cord.    Otitis media. Infection of the middle ear.    Bacteremia or septicemia. Infection in the blood.  Pneumococcal disease can be life-threatening, especially for people in high-risk groups. Each year, thousands of people die of this disease. Thousands more become seriously ill.  The vaccine    The pneumococcal vaccines are the best way to avoid pneumococcal disease. They are safe and effective. The vaccines are given as shots (injections). This can be done at your healthcare provider's office or a health clinic. Drugstores, senior centers, and workplaces often offer vaccinations, too. If you have questions, ask your healthcare provider.  The pneumococcal vaccines are recommended for:    Persons 65 and older    Infants    People with chronic health problems (such as diabetes, chronic lung or heart disease, liver disease)    People who have a cochlear implant    People who have weakened immune systems    People who live in nursing homes or other long-term care facilities    People who smoke or have asthma  They are given:    In a 4-dose series in infants    One time in adults; some people need a second dose of one of the vaccines  Your healthcare provider can tell you more about the vaccines, whether you should get them,  and the number of shots you should get.  Date Last Reviewed: 11/1/2016 2000-2017 [employer]. [address] [place] [address], [place] [address]. All rights reserved. This information is not intended as a substitute for professional medical care. Always follow your healthcare professional's instructions.      I will my chart you with your lab results and any further treatment as indicated.     Thank you  Halie Hernandes CNP

## 2018-03-26 NOTE — PROGRESS NOTES
Screening Questionnaire for Adult Immunization    Are you sick today?   No   Do you have allergies to medications, food, a vaccine component or latex?   No   Have you ever had a serious reaction after receiving a vaccination?   No   Do you have a long-term health problem with heart disease, lung disease, asthma, kidney disease, metabolic disease (e.g. diabetes), anemia, or other blood disorder?   No   Do you have cancer, leukemia, HIV/AIDS, or any other immune system problem?   No   In the past 3 months, have you taken medications that affect  your immune system, such as prednisone, other steroids, or anticancer drugs; drugs for the treatment of rheumatoid arthritis, Crohn s disease, or psoriasis; or have you had radiation treatments?   No   Have you had a seizure, or a brain or other nervous system problem?   No   During the past year, have you received a transfusion of blood or blood     products, or been given immune (gamma) globulin or antiviral drug?   No   For women: Are you pregnant or is there a chance you could become        pregnant during the next month?   No   Have you received any vaccinations in the past 4 weeks?   No     Immunization questionnaire answers were all negative.       Patient instructed to remain in clinic for 15 minutes afterwards, and to report any adverse reaction to me immediately.     Prior to injection verified patient identity using patient's name and date of birth.    Screening performed by Sonia Guerrero on 3/26/2018 at 5:53 PM.

## 2018-03-27 NOTE — PROGRESS NOTES
Shelbi  Your results show greatly improved hemoglobin A1C at 6.4 down from 9.0 and your electrolytes are all normal kidney function is normal. Your thyroid level is normal as will   Keep up the great work!    Please contact me if you have any questions through my-chart or at [phone].    Halie Hernandes CNP

## 2018-03-28 ENCOUNTER — TELEPHONE (OUTPATIENT)
Dept: FAMILY MEDICINE | Facility: OTHER | Age: 58
End: 2018-03-28

## 2018-03-28 DIAGNOSIS — Z12.11 SCREEN FOR COLON CANCER: Primary | ICD-10-CM

## 2018-03-28 NOTE — TELEPHONE ENCOUNTER
You placed a referral for patient to have a colonoscopy with [facility].  Her insurance plan requires her to stay within [place] for the highest benefit level.      Would you be willing to change to either one of the [facility] providers or [facility]?      Thanks!   Sarah/Juan Manuel

## 2018-03-28 NOTE — LETTER

## 2018-03-28 NOTE — LETTER
[facility]  [address]  [place] [address]  [phone]        March 28, 2018    Shelbi Howard  [address] [address]

## 2018-04-06 ENCOUNTER — ANESTHESIA EVENT (OUTPATIENT)
Dept: GASTROENTEROLOGY | Facility: CLINIC | Age: 58
End: 2018-04-06
Payer: COMMERCIAL

## 2018-04-06 ENCOUNTER — ANESTHESIA (OUTPATIENT)
Dept: GASTROENTEROLOGY | Facility: CLINIC | Age: 58
End: 2018-04-06
Payer: COMMERCIAL

## 2018-04-06 ENCOUNTER — HOSPITAL ENCOUNTER (OUTPATIENT)
Facility: CLINIC | Age: 58
Discharge: HOME | End: 2018-04-06
Attending: SURGERY | Admitting: SURGERY
Payer: COMMERCIAL

## 2018-04-06 ENCOUNTER — SURGERY (OUTPATIENT)
Age: 58
End: 2018-04-06

## 2018-04-06 VITALS
DIASTOLIC BLOOD PRESSURE: 72 MMHG | HEART RATE: 66 BPM | RESPIRATION RATE: 16 BRPM | SYSTOLIC BLOOD PRESSURE: 152 MMHG | OXYGEN SATURATION: 96 % | TEMPERATURE: 97.8 F

## 2018-04-06 LAB
COLONOSCOPY: NORMAL
GLUCOSE BLDC GLUCOMTR-MCNC: 130 MG/DL (ref 70–99)

## 2018-04-06 PROCEDURE — 88305 TISSUE EXAM BY PATHOLOGIST: CPT | Mod: 26 | Performed by: SURGERY

## 2018-04-06 PROCEDURE — 25000128 H RX IP 250 OP 636: Performed by: NURSE ANESTHETIST, CERTIFIED REGISTERED

## 2018-04-06 PROCEDURE — 45385 COLONOSCOPY W/LESION REMOVAL: CPT | Mod: PT | Performed by: SURGERY

## 2018-04-06 PROCEDURE — 82962 GLUCOSE BLOOD TEST: CPT

## 2018-04-06 PROCEDURE — 88305 TISSUE EXAM BY PATHOLOGIST: CPT | Performed by: SURGERY

## 2018-04-06 PROCEDURE — 45385 COLONOSCOPY W/LESION REMOVAL: CPT | Performed by: SURGERY

## 2018-04-06 PROCEDURE — 25000125 ZZHC RX 250: Performed by: NURSE ANESTHETIST, CERTIFIED REGISTERED

## 2018-04-06 PROCEDURE — 25000125 ZZHC RX 250: Performed by: SURGERY

## 2018-04-06 PROCEDURE — 40000296 ZZH STATISTIC ENDO RECOVERY CLASS 1:2 FIRST HOUR: Performed by: SURGERY

## 2018-04-06 RX ORDER — NALOXONE HYDROCHLORIDE 0.4 MG/ML
.1-.4 INJECTION, SOLUTION INTRAMUSCULAR; INTRAVENOUS; SUBCUTANEOUS
Status: DISCONTINUED | OUTPATIENT
Start: 2018-04-06 | End: 2018-04-06 | Stop reason: HOSPADM

## 2018-04-06 RX ORDER — ONDANSETRON 2 MG/ML
4 INJECTION INTRAMUSCULAR; INTRAVENOUS
Status: DISCONTINUED | OUTPATIENT
Start: 2018-04-06 | End: 2018-04-06 | Stop reason: HOSPADM

## 2018-04-06 RX ORDER — MEPERIDINE HYDROCHLORIDE 25 MG/ML
12.5 INJECTION INTRAMUSCULAR; INTRAVENOUS; SUBCUTANEOUS
Status: DISCONTINUED | OUTPATIENT
Start: 2018-04-06 | End: 2018-04-06 | Stop reason: HOSPADM

## 2018-04-06 RX ORDER — ONDANSETRON 2 MG/ML
4 INJECTION INTRAMUSCULAR; INTRAVENOUS EVERY 30 MIN PRN
Status: DISCONTINUED | OUTPATIENT
Start: 2018-04-06 | End: 2018-04-06 | Stop reason: HOSPADM

## 2018-04-06 RX ORDER — SODIUM CHLORIDE, SODIUM LACTATE, POTASSIUM CHLORIDE, CALCIUM CHLORIDE 600; 310; 30; 20 MG/100ML; MG/100ML; MG/100ML; MG/100ML
INJECTION, SOLUTION INTRAVENOUS CONTINUOUS
Status: DISCONTINUED | OUTPATIENT
Start: 2018-04-06 | End: 2018-04-06 | Stop reason: HOSPADM

## 2018-04-06 RX ORDER — ONDANSETRON 4 MG/1
4 TABLET, ORALLY DISINTEGRATING ORAL EVERY 6 HOURS PRN
Status: DISCONTINUED | OUTPATIENT
Start: 2018-04-06 | End: 2018-04-06 | Stop reason: HOSPADM

## 2018-04-06 RX ORDER — PROPOFOL 10 MG/ML
INJECTION, EMULSION INTRAVENOUS CONTINUOUS PRN
Status: DISCONTINUED | OUTPATIENT
Start: 2018-04-06 | End: 2018-04-06

## 2018-04-06 RX ORDER — ONDANSETRON 4 MG/1
4 TABLET, ORALLY DISINTEGRATING ORAL EVERY 30 MIN PRN
Status: DISCONTINUED | OUTPATIENT
Start: 2018-04-06 | End: 2018-04-06 | Stop reason: HOSPADM

## 2018-04-06 RX ORDER — LIDOCAINE 40 MG/G
CREAM TOPICAL
Status: DISCONTINUED | OUTPATIENT
Start: 2018-04-06 | End: 2018-04-06 | Stop reason: HOSPADM

## 2018-04-06 RX ORDER — LIDOCAINE HYDROCHLORIDE 20 MG/ML
INJECTION, SOLUTION INFILTRATION; PERINEURAL PRN
Status: DISCONTINUED | OUTPATIENT
Start: 2018-04-06 | End: 2018-04-06

## 2018-04-06 RX ORDER — FLUMAZENIL 0.1 MG/ML
0.2 INJECTION, SOLUTION INTRAVENOUS
Status: DISCONTINUED | OUTPATIENT
Start: 2018-04-06 | End: 2018-04-06 | Stop reason: HOSPADM

## 2018-04-06 RX ORDER — ONDANSETRON 2 MG/ML
4 INJECTION INTRAMUSCULAR; INTRAVENOUS EVERY 6 HOURS PRN
Status: DISCONTINUED | OUTPATIENT
Start: 2018-04-06 | End: 2018-04-06 | Stop reason: HOSPADM

## 2018-04-06 RX ORDER — PROPOFOL 10 MG/ML
INJECTION, EMULSION INTRAVENOUS PRN
Status: DISCONTINUED | OUTPATIENT
Start: 2018-04-06 | End: 2018-04-06

## 2018-04-06 RX ADMIN — PROPOFOL 150 MCG/KG/MIN: 10 INJECTION, EMULSION INTRAVENOUS at 12:38

## 2018-04-06 RX ADMIN — PROPOFOL 50 MG: 10 INJECTION, EMULSION INTRAVENOUS at 12:38

## 2018-04-06 RX ADMIN — LIDOCAINE HYDROCHLORIDE 1 ML: 10 INJECTION, SOLUTION EPIDURAL; INFILTRATION; INTRACAUDAL at 11:56

## 2018-04-06 RX ADMIN — SODIUM CHLORIDE, POTASSIUM CHLORIDE, SODIUM LACTATE AND CALCIUM CHLORIDE: 600; 310; 30; 20 INJECTION, SOLUTION INTRAVENOUS at 11:56

## 2018-04-06 RX ADMIN — LIDOCAINE HYDROCHLORIDE 100 MG: 20 INJECTION, SOLUTION INFILTRATION; PERINEURAL at 12:35

## 2018-04-06 NOTE — DISCHARGE INSTRUCTIONS
[facility]    Home Care Following Endoscopy    Dr. Cheng      Activity:    You have just undergone an endoscopic procedure usually performed with conscious sedation.  Do not work or operate machinery (including a car) for at least 12 hours.      I encourage you to walk and attempt to pass this air as soon as possible.    Diet:    Return to the diet you were on before your procedure but eat lightly for the first 12-24 hours.    Drink plenty of water.    Resume any regular medications unless otherwise advised by your physician.  Please begin any new medication prescribed as a result of your procedure as directed by your physician.     If you had any biopsy or polyp removed please refrain from aspirin or aspirin products for 2 days.  If on Coumadin please restart as instructed by your physician.   Pain:    You may take Tylenol as needed for pain.  Expected Recovery:    You can expect some mild abdominal fullness and/or discomfort due to the air used to inflate your intestinal tract. It is also normal to have a mild sore throat after upper endoscopy.    Call Your Physician if You Have:    After Colonoscopy:  o Worsening persisting abdominal pain which is worse with activity.  o Fevers (>101 degrees F), chills or shakes.  o Passage of continued blood with bowel movements.   Any questions or concerns about your recovery, please call [phone] or after hours [phone] Nurse Advice Line.    Follow-up Care:    You should receive a call or letter with your results within 1 week. Please call if you have not received a notification of your results.    If asked to return to clinic please make an appointment 1 week after your procedure.  Call [phone].     Same-Day Surgery   Adult Discharge Orders & Instructions     For 24 hours after surgery    1. Get plenty of rest.  A responsible adult must stay with you for at least 24 hours after you leave the hospital.   2. Do not drive or use heavy equipment.   If you have weakness or tingling, don't drive or use heavy equipment until this feeling goes away.  3. Do not drink alcohol.  4. Avoid strenuous or risky activities.  Ask for help when climbing stairs.   5. You may feel lightheaded.  IF so, sit for a few minutes before standing.  Have someone help you get up.   6. You may have a slight fever. Call the doctor if your fever is over 100 F (37.7 C) (taken under the tongue) or lasts longer than 24 hours.  7. You may have a dry mouth, a sore throat, muscle aches or trouble sleeping.  These should go away after 24 hours.  8. Do not make important or legal decisions.  Based on the surgery/procedure that you had today, we do not anticipate that you will have any problems.  However, given the various responses that patients can have to the surgical experience, we want to ensure that you have information available to manage pain or nausea and what to do if you observe bleeding or you develop any signs and symptoms of infection:  Methods to control pain include:  Prescription pain medication or over the counter medications as prescribed or suggested by your physician.  In addition, ice packs and periods of rest are often helpful.  If your pain is not managed with the above methods, contact your physician.  Methods to control nausea include:  Anti-nausea medication approved by your physician.  Drink clear liquids such as apple juice, ginger ale, broth or 7-Up. Be sure to drink enough fluids.  Move to a regular diet as you feel able.  Rest may also help.  Bleeding:  It's not uncommon to see a little blood staining on the dressing, about the size of a quarter in the first 24 hours; if you see this, there is no reason to be alarmed.  However, should this continue to increase in size, apply pressure if able, ant notify your physician.  Infection:  We do not anticipate that you will acquire an infection, but if you should experience any of the following symptoms:  redness, swelling,  heat, increasing pain or abnormal drainage at your surgery site, fever or chills, please notify your physician.    Call your doctor for any of the followin.  Signs of infection (fever, growing tenderness at the surgery site, a large amount of drainage or bleeding, severe pain, foul-smelling drainage, redness, swelling).    2. It has been over 8 to 10 hours since surgery and you are still not able to urinate (pass water).    3.  Headache for over 24 hours.    Nurse advice line: [phone]

## 2018-04-06 NOTE — ANESTHESIA PREPROCEDURE EVALUATION
Anesthesia Evaluation     . Pt has had prior anesthetic.     No history of anesthetic complications          ROS/MED HX    ENT/Pulmonary:  - neg pulmonary ROS     Neurologic:       Cardiovascular:     (+) ----. : . . . :. . Previous cardiac testing date:results:date: results:ECG reviewed date: results: date: results:          METS/Exercise Tolerance:  >4 METS   Hematologic:  - neg hematologic  ROS       Musculoskeletal:  - neg musculoskeletal ROS       GI/Hepatic:         Renal/Genitourinary:         Endo:     (+) Obesity, .      Psychiatric:  - neg psychiatric ROS       Infectious Disease:         Malignancy:         Other:                     Physical Exam  Normal systems: cardiovascular and pulmonary    Airway   Mallampati: II  TM distance: >3 FB  Neck ROM: limited    Dental   (+) upper dentures and lower dentures    Cardiovascular       Pulmonary                         Anesthesia Plan      History & Physical Review  History and physical reviewed and following examination; no interval change.    ASA Status:  3 .    NPO Status:  > 8 hours    Plan for MAC with Intravenous and Propofol induction. Maintenance will be TIVA.  Reason for MAC:  Deep or markedly invasive procedure (G8)         Postoperative Care      Consents  Anesthetic plan, risks, benefits and alternatives discussed with:  Patient.  Use of blood products discussed: No .   .                          .

## 2018-04-06 NOTE — ANESTHESIA POSTPROCEDURE EVALUATION
Patient: Shelbi Howard    Procedure(s):  colonoscopy with polypectomy by snare - Wound Class: II-Clean Contaminated    Diagnosis:screening  Diagnosis Additional Information: No value filed.    Anesthesia Type:  MAC    Note:  Anesthesia Post Evaluation    Patient location during evaluation: Phase 2  Patient participation: Able to fully participate in evaluation  Level of consciousness: awake and alert  Pain management: adequate  Airway patency: patent  Cardiovascular status: acceptable  Respiratory status: acceptable  Hydration status: acceptable  PONV: none             Last vitals:  Vitals:    04/06/18 1140   BP: (!) 128/94   Resp: 18   Temp: 97.8  F (36.6  C)   SpO2: 94%         Electronically Signed By: BERLIN Matias CRNA  April 6, 2018  1:15 PM

## 2018-04-06 NOTE — LETTER
Shelbi Howard  [address] [address] [address]    April 11, 2018      Dear Shelbi,  This letter is to inform you of the results of your pathology report from your colonoscopy.  Your pathology report was:  FINAL DIAGNOSIS:   Colon, transverse, polyp, polypectomy   - Tubular adenoma.   - No evidence of high grade dysplasia or malignancy.      This is a low-risk, pre-cancerous polyp that was removed at the time of your colonoscopy. This means you should have a surveillance colonoscopy in 5 years to check for new polyps.  If you have further questions please don t hesitate to call our clinic at [phone].   Sincerely,     Roberto Cheng, DO

## 2018-04-06 NOTE — IP AVS SNAPSHOT
[facility]    [address] [address]    Phone:  [phone]                                       After Visit Summary   4/6/2018    Shelbi Howard    MRN: [id number]           After Visit Summary Signature Page     I have received my discharge instructions, and my questions have been answered. I have discussed any challenges I see with this plan with the nurse or doctor.    ..........................................................................................................................................  Patient/Patient Representative Signature      ..........................................................................................................................................  Patient Representative Print Name and Relationship to Patient    ..................................................               ................................................  Date                                            Time    ..........................................................................................................................................  Reviewed by Signature/Title    ...................................................              ..............................................  Date                                                            Time

## 2018-04-06 NOTE — ANESTHESIA CARE TRANSFER NOTE
Patient: Shelbi Howard    Procedure(s):  colonoscopy with polypectomy by snare - Wound Class: II-Clean Contaminated    Diagnosis: screening  Diagnosis Additional Information: No value filed.    Anesthesia Type:   MAC     Note:  Airway :Room Air  Patient transferred to:Phase II  Handoff Report: Identifed the Patient, Identified the Reponsible Provider, Reviewed the pertinent medical history, Discussed the surgical course, Reviewed Intra-OP anesthesia mangement and issues during anesthesia, Set expectations for post-procedure period and Allowed opportunity for questions and acknowledgement of understanding      Vitals: (Last set prior to Anesthesia Care Transfer)    CRNA VITALS  4/6/2018 1228 - 4/6/2018 1303      4/6/2018             Pulse: 69    SpO2: 98 %    Resp Rate (observed): 19                Electronically Signed By: BERLIN Matias CRNA  April 6, 2018  1:03 PM

## 2018-04-06 NOTE — IP AVS SNAPSHOT
MRN:[id number]                      After Visit Summary   4/6/2018    Shelbi Howard    MRN: [id number]           Thank you!     Thank you for choosing [facility] for your care. Our goal is always to provide you with excellent care. Hearing back from our patients is one way we can continue to improve our services. Please take a few minutes to complete the written survey that you may receive in the mail after you visit with us. Thank you!        Patient Information     Date Of Birth          1960        About your hospital stay     You were admitted on:  April 6, 2018 You last received care in the:  [facility]    You were discharged on:  April 6, 2018       Who to Call     For medical emergencies, please call 911.  For non-urgent questions about your medical care, please call your primary care provider or clinic, [phone]  For questions related to your surgery, please call your surgery clinic        Attending Provider     Provider Roberto Snowden, DO Surgery       Primary Care Provider Office Phone # Fax #    [facility] [phone]      Your next 10 appointments already scheduled     Sep 12, 2018  2:00 PM CDT   Return Visit with BERLIN Dang [facility]    [address] [place] [address] [phone]              Further instructions from your care team         [facility]    Home Care Following Endoscopy    Dr. Cheng      Activity:    You have just undergone an endoscopic procedure usually performed with conscious sedation.  Do not work or operate machinery (including a car) for at least 12 hours.      I encourage you to walk and attempt to pass this air as soon as possible.    Diet:    Return to the diet you were on before your procedure but eat lightly for the first 12-24 hours.    Drink plenty of water.    Resume any  regular medications unless otherwise advised by your physician.  Please begin any new medication prescribed as a result of your procedure as directed by your physician.     If you had any biopsy or polyp removed please refrain from aspirin or aspirin products for 2 days.  If on Coumadin please restart as instructed by your physician.   Pain:    You may take Tylenol as needed for pain.  Expected Recovery:    You can expect some mild abdominal fullness and/or discomfort due to the air used to inflate your intestinal tract. It is also normal to have a mild sore throat after upper endoscopy.    Call Your Physician if You Have:    After Colonoscopy:  o Worsening persisting abdominal pain which is worse with activity.  o Fevers (>101 degrees F), chills or shakes.  o Passage of continued blood with bowel movements.   Any questions or concerns about your recovery, please call [phone] or after hours [phone] Nurse Advice Line.    Follow-up Care:    You should receive a call or letter with your results within 1 week. Please call if you have not received a notification of your results.    If asked to return to clinic please make an appointment 1 week after your procedure.  Call [phone].     Same-Day Surgery   Adult Discharge Orders & Instructions     For 24 hours after surgery    1. Get plenty of rest.  A responsible adult must stay with you for at least 24 hours after you leave the hospital.   2. Do not drive or use heavy equipment.  If you have weakness or tingling, don't drive or use heavy equipment until this feeling goes away.  3. Do not drink alcohol.  4. Avoid strenuous or risky activities.  Ask for help when climbing stairs.   5. You may feel lightheaded.  IF so, sit for a few minutes before standing.  Have someone help you get up.   6. You may have a slight fever. Call the doctor if your fever is over 100 F (37.7 C) (taken under the tongue) or lasts longer than 24 hours.  7. You may have a dry mouth, a  sore throat, muscle aches or trouble sleeping.  These should go away after 24 hours.  8. Do not make important or legal decisions.  Based on the surgery/procedure that you had today, we do not anticipate that you will have any problems.  However, given the various responses that patients can have to the surgical experience, we want to ensure that you have information available to manage pain or nausea and what to do if you observe bleeding or you develop any signs and symptoms of infection:  Methods to control pain include:  Prescription pain medication or over the counter medications as prescribed or suggested by your physician.  In addition, ice packs and periods of rest are often helpful.  If your pain is not managed with the above methods, contact your physician.  Methods to control nausea include:  Anti-nausea medication approved by your physician.  Drink clear liquids such as apple juice, ginger ale, broth or 7-Up. Be sure to drink enough fluids.  Move to a regular diet as you feel able.  Rest may also help.  Bleeding:  It's not uncommon to see a little blood staining on the dressing, about the size of a quarter in the first 24 hours; if you see this, there is no reason to be alarmed.  However, should this continue to increase in size, apply pressure if able, ant notify your physician.  Infection:  We do not anticipate that you will acquire an infection, but if you should experience any of the following symptoms:  redness, swelling, heat, increasing pain or abnormal drainage at your surgery site, fever or chills, please notify your physician.    Call your doctor for any of the followin.  Signs of infection (fever, growing tenderness at the surgery site, a large amount of drainage or bleeding, severe pain, foul-smelling drainage, redness, swelling).    2. It has been over 8 to 10 hours since surgery and you are still not able to urinate (pass water).    3.  Headache for over 24 hours.    Nurse advice  line: [phone]        Pending Results     Date and Time Order Name Status Description    4/6/2018 1252 Surgical pathology exam In process             Admission Information     Date & Time Provider Department Dept. Phone    4/6/2018 Roberto Cheng DO [facility] [phone]      Your Vitals Were     Blood Pressure Pulse Temperature Respirations Pulse Oximetry       116/77 61 97.8  F (36.6  C) (Oral) 16 96%       MyChart Information     MyChart gives you secure access to your electronic health record. If you see a primary care provider, you can also send messages to your care team and make appointments. If you have questions, please call your primary care clinic.  If you do not have a primary care provider, please call [phone] and they will assist you.        Care EveryWhere ID     This is your Care EveryWhere ID. This could be used by other organizations to access your [facility] medical records  [id number]        Equal Access to Services     RUBY THORPE : Hadii sydnee Holder, waaxda lupauline, qaybta kaalmada ademariano, favian ricks. So [facility] [phone].    ATENCIÓN: Si habla español, tiene a hagen disposición servicios gratuitos de asistencia lingüística. Llame al [phone].    We comply with applicable federal civil rights laws and [place] laws. We do not discriminate on the basis of race, color, national origin, age, disability, sex, sexual orientation, or gender identity.               Review of your medicines      CONTINUE these medicines which have NOT CHANGED        Dose / Directions    alcohol swab prep pads   Used for:  Type 2 diabetes mellitus without complication, unspecified long term insulin use status (H)        Use to swab area of injection/jennifer as directed.   Quantity:  100 each   Refills:  0       aspirin 81 MG EC tablet   Used for:  Type 2 diabetes mellitus with hyperosmolarity without coma, without long-term current  use of insulin (H)        Dose:  81 mg   Take 1 tablet (81 mg) by mouth daily   Quantity:  90 tablet   Refills:  3       blood glucose monitoring lancets   Used for:  Type 2 diabetes mellitus without complication, unspecified long term insulin use status (H)        Use to test blood sugar 2 times daily.   Quantity:  100 each   Refills:  3       blood glucose monitoring test strip   Commonly known as:  VI CONTOUR NEXT   Used for:  Type 2 diabetes mellitus without complication, unspecified long term insulin use status (H)        Use to test blood sugar 2 times daily.   Quantity:  100 strip   Refills:  3       ibuprofen 600 MG tablet   Commonly known as:  ADVIL/MOTRIN   Used for:  S/P hysterectomy        Dose:  600 mg   Take 1 tablet (600 mg) by mouth every 6 hours as needed for pain (mild)   Quantity:  30 tablet   Refills:  0       lisinopril 10 MG tablet   Commonly known as:  PRINIVIL/ZESTRIL   Used for:  Type 2 diabetes mellitus with hyperosmolarity without coma, without long-term current use of insulin (H)        Dose:  10 mg   Take 1 tablet (10 mg) by mouth daily   Quantity:  90 tablet   Refills:  1       * metFORMIN osmotic 1000 MG 24 hr tablet   Commonly known as:  FORTAMET   Used for:  Type 2 diabetes mellitus without complication, unspecified long term insulin use status (H)        Dose:  1000 mg   Take 1,000 mg by mouth daily (with dinner)   Quantity:  30 tablet   Refills:  0       * metFORMIN 1000 MG tablet   Commonly known as:  GLUCOPHAGE   Used for:  New onset type 2 diabetes mellitus (H), Type 2 diabetes mellitus with hyperosmolarity without coma, without long-term current use of insulin (H)        Dose:  1000 mg   Take 1 tablet (1,000 mg) by mouth 2 times daily (with meals)   Quantity:  180 tablet   Refills:  3       simvastatin 10 MG tablet   Commonly known as:  ZOCOR   Used for:  Type 2 diabetes mellitus with hyperosmolarity without coma, without long-term current use of insulin (H)        Dose:  10  mg   Take 1 tablet (10 mg) by mouth At Bedtime   Quantity:  90 tablet   Refills:  1       * Notice:  This list has 2 medication(s) that are the same as other medications prescribed for you. Read the directions carefully, and ask your doctor or other care provider to review them with you.             Protect others around you: Learn how to safely use, store and throw away your medicines at www.disposemymeds.org.             Medication List: This is a list of all your medications and when to take them. Check marks below indicate your daily home schedule. Keep this list as a reference.      Medications           Morning Afternoon Evening Bedtime As Needed    alcohol swab prep pads   Use to swab area of injection/jennifer as directed.                                aspirin 81 MG EC tablet   Take 1 tablet (81 mg) by mouth daily                                blood glucose monitoring lancets   Use to test blood sugar 2 times daily.                                blood glucose monitoring test strip   Commonly known as:  VI CONTOUR NEXT   Use to test blood sugar 2 times daily.                                ibuprofen 600 MG tablet   Commonly known as:  ADVIL/MOTRIN   Take 1 tablet (600 mg) by mouth every 6 hours as needed for pain (mild)                                lisinopril 10 MG tablet   Commonly known as:  PRINIVIL/ZESTRIL   Take 1 tablet (10 mg) by mouth daily                                * metFORMIN osmotic 1000 MG 24 hr tablet   Commonly known as:  FORTAMET   Take 1,000 mg by mouth daily (with dinner)                                * metFORMIN 1000 MG tablet   Commonly known as:  GLUCOPHAGE   Take 1 tablet (1,000 mg) by mouth 2 times daily (with meals)                                simvastatin 10 MG tablet   Commonly known as:  ZOCOR   Take 1 tablet (10 mg) by mouth At Bedtime                                * Notice:  This list has 2 medication(s) that are the same as other medications prescribed for you. Read  the directions carefully, and ask your doctor or other care provider to review them with you.

## 2018-04-06 NOTE — H&P (VIEW-ONLY)
SUBJECTIVE:   Shelbi Howard is a 58 year old female who presents to clinic today for the following health issues:      HPI  Diabetes Follow-up    Patient is checking blood sugars: twice daily.    Blood sugar testing frequency justification: Uncontrolled diabetes  Results are as follows:         am - 130s         suppertime - 112-115    Diabetic concerns: None     Symptoms of hypoglycemia (low blood sugar): none     Paresthesias (numbness or burning in feet) or sores: No     Date of last diabetic eye exam: Feb 2018    BP Readings from Last 2 Encounters:   03/20/18 162/82   12/18/17 159/81     Hemoglobin A1C (%)   Date Value   12/05/2017 9.0 (H)     LDL Cholesterol Calculated (mg/dL)   Date Value   12/08/2017 54     Problem list and histories reviewed & adjusted, as indicated.  Pneumonia vaccine will check with insurance.   Went to optometrist in 2/2018 for diabetic eye exam. Moriah [facility].   Denies side effects to medications.   Discussed adding statin medication last LDL 54     Additional history: as documented    Patient Active Problem List   Diagnosis     Post-menopausal bleeding     ASCUS of cervix with negative high risk HPV     Endometrial carcinoma (H)     S/P hysterectomy     New onset type 2 diabetes mellitus (H)     Type 2 diabetes mellitus with hyperosmolarity without coma, without long-term current use of insulin (H)     Past Surgical History:   Procedure Laterality Date     CYSTOSCOPY N/A 12/5/2017    Procedure: CYSTOSCOPY;;  Surgeon: Facundo Parekh MD;  Location: [facility] OR     DAVINCI HYSTERECTOMY TOTAL, BILATERAL SALPINGO-OOPHORECTOMY, NODE DISSECTION, COMBINED N/A 12/5/2017    Procedure: COMBINED DAVINCI HYSTERECTOMY TOTAL, SALPINGO-OOPHORECTOMY, NODE DISSECTION;  DaVinci Assisted Total Laparoscopic Hysterectomy, Right Salpingo-Oophorectomy, Lysis of Adhesions, Cystoscopy;  Surgeon: Facundo Parekh MD;  Location: [facility] OR     LAPAROSCOPIC HYSTERECTOMY TOTAL  12/2017     oopherectomy Left      13 lb benign mass removal       Social History   Substance Use Topics     Smoking status: Former Smoker     Packs/day: 0.50     Years: 38.00     Types: Cigarettes     Quit date: 2/17/2017     Smokeless tobacco: Never Used     Alcohol use Yes      Comment: occasionally      Family History   Problem Relation Age of Onset     DIABETES Brother      Influenza/Pneumonia Father 62     KIDNEY DISEASE Sister          Current Outpatient Prescriptions   Medication Sig Dispense Refill     simvastatin (ZOCOR) 10 MG tablet Take 1 tablet (10 mg) by mouth At Bedtime 90 tablet 1     blood glucose monitoring (VI CONTOUR NEXT) test strip Use to test blood sugar 2 times daily. 100 strip 3     blood glucose monitoring (VI MICROLET) lancets Use to test blood sugar 2 times daily. 100 each 3     lisinopril (PRINIVIL/ZESTRIL) 10 MG tablet Take 1 tablet (10 mg) by mouth daily 90 tablet 1     aspirin 81 MG EC tablet Take 1 tablet (81 mg) by mouth daily 90 tablet 3     metFORMIN (GLUCOPHAGE) 1000 MG tablet Take 1 tablet (1,000 mg) by mouth 2 times daily (with meals) 180 tablet 3     metFORMIN (FORTAMET) 1000 MG 24 hr tablet Take 1,000 mg by mouth daily (with dinner) 30 tablet 0     alcohol swab prep pads Use to swab area of injection/jennifer as directed. 100 each 0     ibuprofen (ADVIL/MOTRIN) 600 MG tablet Take 1 tablet (600 mg) by mouth every 6 hours as needed for pain (mild) 30 tablet 0     Allergies   Allergen Reactions     Latex Itching     Recent Labs   Lab Test  03/26/18   1752  12/08/17   1458  12/06/17   0555  12/05/17 2003 11/20/17   1222   A1C  6.4*   --    --   9.0*   --    LDL   --   54   --    --    --    HDL   --   46*   --    --    --    TRIG   --   125   --    --    --    CR   --    --   0.78   --   0.65   GFRESTIMATED   --    --   75   --   >90   GFRESTBLACK   --    --   >90   --   >90   POTASSIUM   --    --   4.3   --   4.2   TSH   --   0.36*   --    --    --       BP Readings from Last 3 Encounters:   03/26/18  "142/86   03/20/18 162/82   12/18/17 159/81    Wt Readings from Last 3 Encounters:   03/26/18 180 lb (81.6 kg)   03/20/18 181 lb 3.5 oz (82.2 kg)   12/18/17 181 lb 6.4 oz (82.3 kg)                  Labs reviewed in EPIC    ROS:  Constitutional, HEENT, cardiovascular, pulmonary, gi and gu systems are negative, except as otherwise noted.    OBJECTIVE:     /86 (BP Location: Left arm, Patient Position: Chair, Cuff Size: Adult Large)  Pulse 78  Temp 97.7  F (36.5  C) (Oral)  Resp 18  Ht 5' 2.5\" (1.588 m)  Wt 180 lb (81.6 kg)  BMI 32.4 kg/m2  Body mass index is 32.4 kg/(m^2).  GENERAL: healthy, alert and no distress  EYES: Eyes grossly normal to inspection, PERRL and conjunctivae and sclerae normal  HENT: ear canals and TM's normal, nose and mouth without ulcers or lesions  NECK: no adenopathy, no asymmetry, masses, or scars and thyroid normal to palpation  RESP: lungs clear to auscultation - no rales, rhonchi or wheezes  CV: regular rate and rhythm, normal S1 S2, no S3 or S4, no murmur, click or rub, no peripheral edema and peripheral pulses strong  ABDOMEN: soft, nontender, no hepatosplenomegaly, no masses and bowel sounds normal  MS: no gross musculoskeletal defects noted, no edema  SKIN: no suspicious lesions or rashes  NEURO: Normal strength and tone, mentation intact and speech normal  PSYCH: mentation appears normal, affect normal/bright  Diabetic foot exam: normal DP and PT pulses, no trophic changes or ulcerative lesions and normal sensory exam    ASSESSMENT/PLAN:     1. Type 2 diabetes mellitus with hyperosmolarity without coma, without long-term current use of insulin (H)  Recommend starting statin medication  - simvastatin (ZOCOR) 10 MG tablet; Take 1 tablet (10 mg) by mouth At Bedtime  Dispense: 90 tablet; Refill: 1  - **A1C FUTURE 3mo  - **Basic metabolic panel FUTURE anytime    2. Screen for colon cancer  referal place  - GASTROENTEROLOGY ADULT REF PROCEDURE ONLY [facility] [phone]; " [facility]    3. Visit for screening mammogram  Referral placed  - MA SCREENING DIGITAL BILAT - Future  (s+30); Future    4. Screening for diabetic peripheral neuropathy  Foot exam completed  - FOOT EXAM  NO CHARGE [[id number]]    5. Need for prophylactic vaccination against Streptococcus pneumoniae (pneumococcus)  Information given for patient to review she is going to call her insurance company to see if this will be covered for her.    6. Need for prophylactic vaccination with tetanus-diphtheria (TD)  Given  - TDAP VACCINE (ADACEL)    7. New onset type 2 diabetes mellitus (H)  Recheck of lab  - **A1C FUTURE 3mo  - **Basic metabolic panel FUTURE anytime    8. Subclinical hypothyroidism  Recheck of lab  - TSH with free T4 reflex    Patient Instructions     Pneumococcal Vaccination  There are 2 pneumococcal vaccinations that protect people against pneumococcal disease.  Pneumococcal disease  Pneumococcal disease is caused by bacteria (Streptococcus pneumoniae). This germ is easily spread when someone with the bacteria coughs, sneezes, laughs, or talks. You can get pneumococcal disease more than once. This is because there are many different types (strains) of the bacteria. Some strains are also resistant to treatment with antibiotics.  There are different kinds of pneumococcal disease, depending on what part of the body is infected. They include:    Pneumonia. Infection in the lungs.    Meningitis. Infection of the covering of the brain and spinal cord.    Otitis media. Infection of the middle ear.    Bacteremia or septicemia. Infection in the blood.  Pneumococcal disease can be life-threatening, especially for people in high-risk groups. Each year, thousands of people die of this disease. Thousands more become seriously ill.  The vaccine    The pneumococcal vaccines are the best way to avoid pneumococcal disease. They are safe and effective. The vaccines are given as shots (injections). This can be done  at your healthcare provider's office or a health clinic. Drugstores, senior centers, and workplaces often offer vaccinations, too. If you have questions, ask your healthcare provider.  The pneumococcal vaccines are recommended for:    Persons 65 and older    Infants    People with chronic health problems (such as diabetes, chronic lung or heart disease, liver disease)    People who have a cochlear implant    People who have weakened immune systems    People who live in nursing homes or other long-term care facilities    People who smoke or have asthma  They are given:    In a 4-dose series in infants    One time in adults; some people need a second dose of one of the vaccines  Your healthcare provider can tell you more about the vaccines, whether you should get them, and the number of shots you should get.  Date Last Reviewed: 11/1/2016 2000-2017 The [employer]. [address] [place] [address], [place] [address]. All rights reserved. This information is not intended as a substitute for professional medical care. Always follow your healthcare professional's instructions.      I will my chart you with your lab results and any further treatment as indicated.     Thank you  Halie Hernandes [facility]

## 2018-04-06 NOTE — INTERVAL H&P NOTE
I have verified the history with the patient and following examination, there are no changes to the history and physical examination. The patient is cleared for proposed surgical procedure.

## 2018-04-10 LAB — COPATH REPORT: NORMAL

## 2018-05-29 ENCOUNTER — MYC REFILL (OUTPATIENT)
Dept: FAMILY MEDICINE | Facility: OTHER | Age: 58
End: 2018-05-29

## 2018-05-29 DIAGNOSIS — E11.00 TYPE 2 DIABETES MELLITUS WITH HYPEROSMOLARITY WITHOUT COMA, WITHOUT LONG-TERM CURRENT USE OF INSULIN (H): ICD-10-CM

## 2018-05-29 NOTE — TELEPHONE ENCOUNTER
Message from Silviat:  Original authorizing provider: BERLIN Jones CNP    Shelbi Howard would like a refill of the following medications:  lisinopril (PRINIVIL/ZESTRIL) 10 MG tablet [BERLIN Jones CNP]    Preferred pharmacy: [facility] [address] [place] [id number] [facility]    Comment:  could we please switch my pharmacy to [facility] in [place], [place].

## 2018-05-30 ENCOUNTER — MYC REFILL (OUTPATIENT)
Dept: FAMILY MEDICINE | Facility: OTHER | Age: 58
End: 2018-05-30

## 2018-05-30 DIAGNOSIS — E11.00 TYPE 2 DIABETES MELLITUS WITH HYPEROSMOLARITY WITHOUT COMA, WITHOUT LONG-TERM CURRENT USE OF INSULIN (H): ICD-10-CM

## 2018-05-30 NOTE — TELEPHONE ENCOUNTER
Lisinopril    BP Readings from Last 3 Encounters:   04/06/18 152/72   03/26/18 142/86   03/20/18 162/82     Routing refill request to provider for review/approval because:  Labs out of range:  B/P    Ryanne Borrego, RN, BSN

## 2018-05-30 NOTE — TELEPHONE ENCOUNTER
Message from Silviat:  Original authorizing provider: BERLIN Jones CNP    Shelbi Howard would like a refill of the following medications:  lisinopril (PRINIVIL/ZESTRIL) 10 MG tablet [BERLIN Jones CNP]    Preferred pharmacy: [facility] [address] [place] [id number] [facility]    Comment:  please change to [facility]

## 2018-05-31 RX ORDER — LISINOPRIL 10 MG/1
10 TABLET ORAL DAILY
Qty: 30 TABLET | Refills: 0 | Status: SHIPPED | OUTPATIENT
Start: 2018-05-31 | End: 2018-06-29

## 2018-05-31 NOTE — TELEPHONE ENCOUNTER
Refill given for Lisinopril 10 mg for one month. Please have Shelbi schedule ov as she was started on this medication for renal protection secondary to diabetes. She is also due for follow up recheck of her diabetes and would like to address blood pressure if continues to be elevated.     Thank you  Halie Hernandes CNP

## 2018-06-01 RX ORDER — LISINOPRIL 10 MG/1
10 TABLET ORAL DAILY
Qty: 90 TABLET | Refills: 1 | OUTPATIENT
Start: 2018-06-01

## 2018-07-02 NOTE — PROGRESS NOTES
SUBJECTIVE:   Shelbi Howard is a 58 year old female who presents to clinic today for the following health issues:      HPI  Hypertension Follow-up      Outpatient blood pressures are not being checked.    Low Salt Diet: no added salt  Has elevated blood pressure readings > 140/90   She states she went to ophthalmologist in [facility] for eye exam in Feb 2018    Is not eating a DASH diet and is trying to walk daily is doing about 2 miles now.     Problem list and histories reviewed & adjusted, as indicated.  Additional history: as documented        Patient Active Problem List   Diagnosis     Post-menopausal bleeding     ASCUS of cervix with negative high risk HPV     Endometrial carcinoma (H)     S/P hysterectomy     New onset type 2 diabetes mellitus (H)     Type 2 diabetes mellitus with hyperosmolarity without coma, without long-term current use of insulin (H)     Past Surgical History:   Procedure Laterality Date     CYSTOSCOPY N/A 12/5/2017    Procedure: CYSTOSCOPY;;  Surgeon: Facundo Parekh MD;  Location: [facility] OR     DAVINCI HYSTERECTOMY TOTAL, BILATERAL SALPINGO-OOPHORECTOMY, NODE DISSECTION, COMBINED N/A 12/5/2017    Procedure: COMBINED DAVINCI HYSTERECTOMY TOTAL, SALPINGO-OOPHORECTOMY, NODE DISSECTION;  DaVinci Assisted Total Laparoscopic Hysterectomy, Right Salpingo-Oophorectomy, Lysis of Adhesions, Cystoscopy;  Surgeon: Facundo Parekh MD;  Location: [facility] OR     LAPAROSCOPIC HYSTERECTOMY TOTAL  12/2017     oopherectomy Left     13 lb benign mass removal       Social History   Substance Use Topics     Smoking status: Former Smoker     Packs/day: 0.50     Years: 38.00     Types: Cigarettes     Quit date: 2/17/2017     Smokeless tobacco: Never Used     Alcohol use Yes      Comment: 4 x times     Family History   Problem Relation Age of Onset     Diabetes Brother      Influenza/Pneumonia Father 62     KIDNEY DISEASE Sister          Current Outpatient Prescriptions   Medication Sig Dispense  Refill     lisinopril-hydrochlorothiazide (PRINZIDE/ZESTORETIC) 10-12.5 MG per tablet Take 1 tablet by mouth daily 60 tablet 0     alcohol swab prep pads Use to swab area of injection/jennifer as directed. 100 each 0     aspirin 81 MG EC tablet Take 1 tablet (81 mg) by mouth daily 90 tablet 3     blood glucose monitoring (VI CONTOUR NEXT) test strip Use to test blood sugar 2 times daily. 100 strip 3     blood glucose monitoring (VI MICROLET) lancets Use to test blood sugar 2 times daily. 100 each 3     metFORMIN (GLUCOPHAGE) 1000 MG tablet Take 1 tablet (1,000 mg) by mouth 2 times daily (with meals) 180 tablet 3     simvastatin (ZOCOR) 10 MG tablet Take 1 tablet (10 mg) by mouth At Bedtime 90 tablet 1     [DISCONTINUED] metFORMIN (FORTAMET) 1000 MG 24 hr tablet Take 1,000 mg by mouth daily (with dinner) (Patient not taking: Reported on 7/9/2018) 30 tablet 0     Allergies   Allergen Reactions     Latex Itching     Recent Labs   Lab Test  07/09/18   1725  03/26/18   1752  12/08/17   1458  12/06/17   0555  12/05/17 2003   A1C  6.2*  6.4*   --    --   9.0*   LDL   --    --   54   --    --    HDL   --    --   46*   --    --    TRIG   --    --   125   --    --    CR   --   0.59   --   0.78   --    GFRESTIMATED   --   >90   --   75   --    GFRESTBLACK   --   >90   --   >90   --    POTASSIUM   --   3.9   --   4.3   --    TSH   --   0.53  0.36*   --    --       BP Readings from Last 3 Encounters:   07/09/18 148/90   04/06/18 152/72   03/26/18 142/86    Wt Readings from Last 3 Encounters:   07/09/18 182 lb 9.6 oz (82.8 kg)   03/26/18 180 lb (81.6 kg)   03/20/18 181 lb 3.5 oz (82.2 kg)                  Labs reviewed in EPIC    ROS:  Constitutional, HEENT, cardiovascular, pulmonary, gi and gu systems are negative, except as otherwise noted.    OBJECTIVE:     /90 (BP Location: Right arm, Patient Position: Chair, Cuff Size: Adult Large)  Pulse 74  Temp 98  F (36.7  C) (Oral)  Resp 16  Wt 182 lb 9.6 oz (82.8 kg)   BMI 32.87 kg/m2  Body mass index is 32.87 kg/(m^2).  GENERAL: healthy, alert and no distress  EYES: Eyes grossly normal to inspection, PERRL and conjunctivae and sclerae normal  HENT: ear canals and TM's normal, nose and mouth without ulcers or lesions  NECK: no adenopathy, no asymmetry, masses, or scars and thyroid normal to palpation  RESP: lungs clear to auscultation - no rales, rhonchi or wheezes  CV: regular rate and rhythm, normal S1 S2, no S3 or S4, no murmur, click or rub, no peripheral edema and peripheral pulses strong  SKIN: no suspicious lesions or rashes    ASSESSMENT/PLAN:     1. Benign essential hypertension  New diagnosis discussed lifestyle changes and diet. Declined Pgen study. Will start rx as prescribed follow in 3 months may have phone visit if doing well.     - Basic metabolic panel  - lisinopril-hydrochlorothiazide (PRINZIDE/ZESTORETIC) 10-12.5 MG per tablet; Take 1 tablet by mouth daily  Dispense: 60 tablet; Refill: 0    2. Type 2 diabetes mellitus with hyperosmolarity without coma, without long-term current use of insulin (H)  Due for labs for diabetes.   - Hemoglobin A1c  - Basic metabolic panel    3. Screening for HIV (human immunodeficiency virus)  screening  - HIV Screening    She is due for pneumovax information given declined today she will check with her insurance company.     Patient Instructions     Pneumococcal Vaccination  There are 2 pneumococcal vaccinations that protect people against pneumococcal disease.  Pneumococcal disease  Pneumococcal disease is caused by bacteria (Streptococcus pneumoniae). This germ is easily spread when someone with the bacteria coughs, sneezes, laughs, or talks. You can get pneumococcal disease more than once. This is because there are many different types (strains) of the bacteria. Some strains are also resistant to treatment with antibiotics.  There are different kinds of pneumococcal disease, depending on what part of the body is infected. They  include:    Pneumonia. Infection in the lungs.    Meningitis. Infection of the covering of the brain and spinal cord.    Otitis media. Infection of the middle ear.    Bacteremia or septicemia. Infection in the blood.  Pneumococcal disease can be life-threatening, especially for people in high-risk groups. Each year, thousands of people die of this disease. Thousands more become seriously ill.  The vaccine    The pneumococcal vaccines are the best way to avoid pneumococcal disease. They are safe and effective. The vaccines are given as shots (injections). This can be done at your healthcare provider's office or a health clinic. Drugstores, senior centers, and workplaces often offer vaccinations, too. If you have questions, ask your healthcare provider.  The pneumococcal vaccines are recommended for:    Persons 65 and older    Infants    People with chronic health problems (such as diabetes, chronic lung or heart disease, liver disease)    People who have a cochlear implant    People who have weakened immune systems    People who live in nursing homes or other long-term care facilities    People who smoke or have asthma  They are given:    In a 4-dose series in infants    One time in adults; some people need a second dose of one of the vaccines  Your healthcare provider can tell you more about the vaccines, whether you should get them, and the number of shots you should get.  Date Last Reviewed: 11/1/2016 2000-2017 [employer]. [address] [place] [address], [place] [address]. All rights reserved. This information is not intended as a substitute for professional medical care. Always follow your healthcare professional's instructions.      Please call to schedule mammogram   Low-Salt Choices  Eating salt (sodium) can make your body retain too much water. Excess water makes your heart work harder. Canned, packaged, and frozen foods are easy to prepare. But they are often high in sodium. Here are some ideas for  low-salt foods you can easily make yourself.    For breakfast    Fruit or 100% fruit juice    Whole-wheat bread or an English muffin. Look for sodium content on Nutrition Facts labels.    Low-fat milk or yogurt    Unsalted eggs    Shredded wheat    Corn tortillas    Unsalted steamed rice    Regular (not instant) hot cereal, made without salt  Stay away from:    Sausage, brown, and ham    Flour tortillas    Packaged muffins, pancakes, and biscuits    Instant hot cereals    Cottage cheese  For lunch and dinner    Fresh fish, chicken, turkey, or meat--baked, broiled, or roasted without salt    Dry beans, cooked without salt    Tofu, stir-fried without salt    Unsalted fresh fruit and vegetables, or frozen or canned fruit and vegetables with no added salt  Stay away from:    Lunch or deli meat that is cured or smoked    Cheese    Tomato juice and ketchup    Canned vegetables, soups, and fish not labeled as no-salt-added or reduced sodium    Packaged gravies and sauces    Olives, pickles, and relish    Bottled salad dressings  For snacks and desserts    Yogurt    Unsalted, air-popped popcorn    Unsalted nuts or seeds  Stay away from:    Pies and cakes    Packaged dessert mixes    Pizza    Canned and packaged puddings    Pretzels, chips, crackers, and nuts--unless the label says unsalted  Date Last Reviewed: 6/1/2017 2000-2017 The [employer]. [address] [place] [address]. All rights reserved. This information is not intended as a substitute for professional medical care. Always follow your healthcare professional's instructions.      Monitor your blood pressure daily keep a log. You want your numbers to be < 140/90. Monitor for symptoms of low blood pressure light headedness, dizziness fatigue.     We will follow up via phone visit as discussed as long as you are doing well and tolerating the medication.     Thank you  Halie Hernandes [facility]

## 2018-07-09 ENCOUNTER — OFFICE VISIT (OUTPATIENT)
Dept: FAMILY MEDICINE | Facility: OTHER | Age: 58
End: 2018-07-09
Payer: COMMERCIAL

## 2018-07-09 VITALS
BODY MASS INDEX: 33.6 KG/M2 | TEMPERATURE: 98 F | HEART RATE: 74 BPM | WEIGHT: 182.6 LBS | RESPIRATION RATE: 16 BRPM | SYSTOLIC BLOOD PRESSURE: 148 MMHG | DIASTOLIC BLOOD PRESSURE: 90 MMHG | HEIGHT: 62 IN

## 2018-07-09 DIAGNOSIS — Z11.4 SCREENING FOR HIV (HUMAN IMMUNODEFICIENCY VIRUS): ICD-10-CM

## 2018-07-09 DIAGNOSIS — I10 BENIGN ESSENTIAL HYPERTENSION: Primary | ICD-10-CM

## 2018-07-09 DIAGNOSIS — E11.00 TYPE 2 DIABETES MELLITUS WITH HYPEROSMOLARITY WITHOUT COMA, WITHOUT LONG-TERM CURRENT USE OF INSULIN (H): ICD-10-CM

## 2018-07-09 LAB
ANION GAP SERPL CALCULATED.3IONS-SCNC: 9 MMOL/L (ref 3–14)
BUN SERPL-MCNC: 23 MG/DL (ref 7–30)
CALCIUM SERPL-MCNC: 9.3 MG/DL (ref 8.5–10.1)
CHLORIDE SERPL-SCNC: 106 MMOL/L (ref 94–109)
CO2 SERPL-SCNC: 28 MMOL/L (ref 20–32)
CREAT SERPL-MCNC: 0.72 MG/DL (ref 0.52–1.04)
GFR SERPL CREATININE-BSD FRML MDRD: 82 ML/MIN/1.7M2
GLUCOSE SERPL-MCNC: 104 MG/DL (ref 70–99)
HBA1C MFR BLD: 6.2 % (ref 0–5.6)
POTASSIUM SERPL-SCNC: 4.4 MMOL/L (ref 3.4–5.3)
SODIUM SERPL-SCNC: 143 MMOL/L (ref 133–144)

## 2018-07-09 PROCEDURE — 83036 HEMOGLOBIN GLYCOSYLATED A1C: CPT | Performed by: NURSE PRACTITIONER

## 2018-07-09 PROCEDURE — 80048 BASIC METABOLIC PNL TOTAL CA: CPT | Performed by: NURSE PRACTITIONER

## 2018-07-09 PROCEDURE — 36415 COLL VENOUS BLD VENIPUNCTURE: CPT | Performed by: NURSE PRACTITIONER

## 2018-07-09 PROCEDURE — 87389 HIV-1 AG W/HIV-1&-2 AB AG IA: CPT | Performed by: NURSE PRACTITIONER

## 2018-07-09 PROCEDURE — 99214 OFFICE O/P EST MOD 30 MIN: CPT | Performed by: NURSE PRACTITIONER

## 2018-07-09 RX ORDER — LISINOPRIL/HYDROCHLOROTHIAZIDE 10-12.5 MG
1 TABLET ORAL DAILY
Qty: 60 TABLET | Refills: 0 | Status: SHIPPED | OUTPATIENT
Start: 2018-07-09 | End: 2018-09-04

## 2018-07-09 NOTE — PATIENT INSTRUCTIONS
Pneumococcal Vaccination  There are 2 pneumococcal vaccinations that protect people against pneumococcal disease.  Pneumococcal disease  Pneumococcal disease is caused by bacteria (Streptococcus pneumoniae). This germ is easily spread when someone with the bacteria coughs, sneezes, laughs, or talks. You can get pneumococcal disease more than once. This is because there are many different types (strains) of the bacteria. Some strains are also resistant to treatment with antibiotics.  There are different kinds of pneumococcal disease, depending on what part of the body is infected. They include:    Pneumonia. Infection in the lungs.    Meningitis. Infection of the covering of the brain and spinal cord.    Otitis media. Infection of the middle ear.    Bacteremia or septicemia. Infection in the blood.  Pneumococcal disease can be life-threatening, especially for people in high-risk groups. Each year, thousands of people die of this disease. Thousands more become seriously ill.  The vaccine    The pneumococcal vaccines are the best way to avoid pneumococcal disease. They are safe and effective. The vaccines are given as shots (injections). This can be done at your healthcare provider's office or a health clinic. Drugstores, senior centers, and workplaces often offer vaccinations, too. If you have questions, ask your healthcare provider.  The pneumococcal vaccines are recommended for:    Persons 65 and older    Infants    People with chronic health problems (such as diabetes, chronic lung or heart disease, liver disease)    People who have a cochlear implant    People who have weakened immune systems    People who live in nursing homes or other long-term care facilities    People who smoke or have asthma  They are given:    In a 4-dose series in infants    One time in adults; some people need a second dose of one of the vaccines  Your healthcare provider can tell you more about the vaccines, whether you should get them,  and the number of shots you should get.  Date Last Reviewed: 11/1/2016 2000-2017 [employer]. [address] [place] [address], [place] [address]. All rights reserved. This information is not intended as a substitute for professional medical care. Always follow your healthcare professional's instructions.      Please call to schedule mammogram   Low-Salt Choices  Eating salt (sodium) can make your body retain too much water. Excess water makes your heart work harder. Canned, packaged, and frozen foods are easy to prepare. But they are often high in sodium. Here are some ideas for low-salt foods you can easily make yourself.    For breakfast    Fruit or 100% fruit juice    Whole-wheat bread or an English muffin. Look for sodium content on Nutrition Facts labels.    Low-fat milk or yogurt    Unsalted eggs    Shredded wheat    Corn tortillas    Unsalted steamed rice    Regular (not instant) hot cereal, made without salt  Stay away from:    Sausage, brown, and ham    Flour tortillas    Packaged muffins, pancakes, and biscuits    Instant hot cereals    Cottage cheese  For lunch and dinner    Fresh fish, chicken, turkey, or meat--baked, broiled, or roasted without salt    Dry beans, cooked without salt    Tofu, stir-fried without salt    Unsalted fresh fruit and vegetables, or frozen or canned fruit and vegetables with no added salt  Stay away from:    Lunch or deli meat that is cured or smoked    Cheese    Tomato juice and ketchup    Canned vegetables, soups, and fish not labeled as no-salt-added or reduced sodium    Packaged gravies and sauces    Olives, pickles, and relish    Bottled salad dressings  For snacks and desserts    Yogurt    Unsalted, air-popped popcorn    Unsalted nuts or seeds  Stay away from:    Pies and cakes    Packaged dessert mixes    Pizza    Canned and packaged puddings    Pretzels, chips, crackers, and nuts--unless the label says unsalted  Date Last Reviewed: 6/1/2017 2000-2017 The  [employer]. [address] [place] [address], [place] [address]. All rights reserved. This information is not intended as a substitute for professional medical care. Always follow your healthcare professional's instructions.      Monitor your blood pressure daily keep a log. You want your numbers to be < 140/90. Monitor for symptoms of low blood pressure light headedness, dizziness fatigue.     We will follow up via phone visit as discussed as long as you are doing well and tolerating the medication.     Thank you  Halie Hernandes CNP

## 2018-07-09 NOTE — MR AVS SNAPSHOT
After Visit Summary   7/9/2018    Shelbi Howard    MRN: [id number]           Patient Information     Date Of Birth          1960        Visit Information        Provider Department      7/9/2018 4:40 PM Halie Hernandes APRN [facility]        Today's Diagnoses     Benign essential hypertension    -  1    Type 2 diabetes mellitus with hyperosmolarity without coma, without long-term current use of insulin (H)        Screening for HIV (human immunodeficiency virus)          Care Instructions      Pneumococcal Vaccination  There are 2 pneumococcal vaccinations that protect people against pneumococcal disease.  Pneumococcal disease  Pneumococcal disease is caused by bacteria (Streptococcus pneumoniae). This germ is easily spread when someone with the bacteria coughs, sneezes, laughs, or talks. You can get pneumococcal disease more than once. This is because there are many different types (strains) of the bacteria. Some strains are also resistant to treatment with antibiotics.  There are different kinds of pneumococcal disease, depending on what part of the body is infected. They include:    Pneumonia. Infection in the lungs.    Meningitis. Infection of the covering of the brain and spinal cord.    Otitis media. Infection of the middle ear.    Bacteremia or septicemia. Infection in the blood.  Pneumococcal disease can be life-threatening, especially for people in high-risk groups. Each year, thousands of people die of this disease. Thousands more become seriously ill.  The vaccine    The pneumococcal vaccines are the best way to avoid pneumococcal disease. They are safe and effective. The vaccines are given as shots (injections). This can be done at your healthcare provider's office or a health clinic. Drugstores, senior centers, and workplaces often offer vaccinations, too. If you have questions, ask your healthcare provider.  The pneumococcal vaccines are recommended  for:    Persons 65 and older    Infants    People with chronic health problems (such as diabetes, chronic lung or heart disease, liver disease)    People who have a cochlear implant    People who have weakened immune systems    People who live in nursing homes or other long-term care facilities    People who smoke or have asthma  They are given:    In a 4-dose series in infants    One time in adults; some people need a second dose of one of the vaccines  Your healthcare provider can tell you more about the vaccines, whether you should get them, and the number of shots you should get.  Date Last Reviewed: 11/1/2016 2000-2017 [employer]. [address] [place], [place] [id number]. All rights reserved. This information is not intended as a substitute for professional medical care. Always follow your healthcare professional's instructions.      Please call to schedule mammogram   Low-Salt Choices  Eating salt (sodium) can make your body retain too much water. Excess water makes your heart work harder. Canned, packaged, and frozen foods are easy to prepare. But they are often high in sodium. Here are some ideas for low-salt foods you can easily make yourself.    For breakfast    Fruit or 100% fruit juice    Whole-wheat bread or an English muffin. Look for sodium content on Nutrition Facts labels.    Low-fat milk or yogurt    Unsalted eggs    Shredded wheat    Corn tortillas    Unsalted steamed rice    Regular (not instant) hot cereal, made without salt  Stay away from:    Sausage, brown, and ham    Flour tortillas    Packaged muffins, pancakes, and biscuits    Instant hot cereals    Cottage cheese  For lunch and dinner    Fresh fish, chicken, turkey, or meat--baked, broiled, or roasted without salt    Dry beans, cooked without salt    Tofu, stir-fried without salt    Unsalted fresh fruit and vegetables, or frozen or canned fruit and vegetables with no added salt  Stay away from:    Lunch or deli meat  that is cured or smoked    Cheese    Tomato juice and ketchup    Canned vegetables, soups, and fish not labeled as no-salt-added or reduced sodium    Packaged gravies and sauces    Olives, pickles, and relish    Bottled salad dressings  For snacks and desserts    Yogurt    Unsalted, air-popped popcorn    Unsalted nuts or seeds  Stay away from:    Pies and cakes    Packaged dessert mixes    Pizza    Canned and packaged puddings    Pretzels, chips, crackers, and nuts--unless the label says unsalted  Date Last Reviewed: 6/1/2017 2000-2017 [employer]. [address] [place] [address]. All rights reserved. This information is not intended as a substitute for professional medical care. Always follow your healthcare professional's instructions.      Monitor your blood pressure daily keep a log. You want your numbers to be < 140/90. Monitor for symptoms of low blood pressure light headedness, dizziness fatigue.     We will follow up via phone visit as discussed as long as you are doing well and tolerating the medication.     Thank you  Halie Hernandes CNP            Follow-ups after your visit        Your next 10 appointments already scheduled     Sep 12, 2018  2:00 PM CDT   Return Visit with BERLIN Dang CNP   [facility]    [id number] [place] [id number] [phone]              Who to contact     If you have questions or need follow up information about today's clinic visit or your schedule please contact [facility] directly at [phone].  Normal or non-critical lab and imaging results will be communicated to you by MyChart, letter or phone within 4 business days after the clinic has received the results. If you do not hear from us within 7 days, please contact the clinic through MyChart or phone. If you have a critical or abnormal lab result, we will notify you by phone as soon as  possible.  Submit refill requests through [email or web address] or call your pharmacy and they will forward the refill request to us. Please allow 3 business days for your refill to be completed.          Additional Information About Your Visit        [email or web address] Information     [email or web address] gives you secure access to your electronic health record. If you see a primary care provider, you can also send messages to your care team and make appointments. If you have questions, please call your primary care clinic.  If you do not have a primary care provider, please call [phone] and they will assist you.        Care EveryWhere ID     This is your Care EveryWhere ID. This could be used by other organizations to access your [facility] medical records  [id number]        Your Vitals Were     Pulse Temperature Respirations BMI (Body Mass Index)          74 98  F (36.7  C) (Oral) 16 32.87 kg/m2         Blood Pressure from Last 3 Encounters:   07/09/18 148/90   04/06/18 152/72   03/26/18 142/86    Weight from Last 3 Encounters:   07/09/18 182 lb 9.6 oz (82.8 kg)   03/26/18 180 lb (81.6 kg)   03/20/18 181 lb 3.5 oz (82.2 kg)              We Performed the Following     Basic metabolic panel     Hemoglobin A1c     HIV Screening          Today's Medication Changes          These changes are accurate as of 7/9/18  5:17 PM.  If you have any questions, ask your nurse or doctor.               Start taking these medicines.        Dose/Directions    lisinopril-hydrochlorothiazide 10-12.5 MG per tablet   Commonly known as:  PRINZIDE/ZESTORETIC   Used for:  Benign essential hypertension   Started by:  Halie Hernandes APRN CNP        Dose:  1 tablet   Take 1 tablet by mouth daily   Quantity:  60 tablet   Refills:  0            Where to get your medicines      These medications were sent to [facility] [address] [place], [place] - [address] [facility]  [address] [facility] [address]     Phone:  [phone]      lisinopril-hydrochlorothiazide 10-12.5 MG per tablet                Primary Care Provider Fax #    Physician No Ref-Primary [phone]       No address on file        Equal Access to Services     RUBY THORPE : Hadii aad ku hadduane Holder, aliyah diannepauline, nathalie kasaroj parikh, favian anderson laLuistri ricks. So [facility] [phone].    ATENCIÓN: Si habla español, tiene a hagen disposición servicios gratuitos de asistencia lingüística. Llame al [phone].    We comply with applicable federal civil rights laws and [place] laws. We do not discriminate on the basis of race, color, national origin, age, disability, sex, sexual orientation, or gender identity.            Thank you!     Thank you for choosing [facility]  for your care. Our goal is always to provide you with excellent care. Hearing back from our patients is one way we can continue to improve our services. Please take a few minutes to complete the written survey that you may receive in the mail after your visit with us. Thank you!             Your Updated Medication List - Protect others around you: Learn how to safely use, store and throw away your medicines at www.disposemymeds.org.          This list is accurate as of 7/9/18  5:17 PM.  Always use your most recent med list.                   Brand Name Dispense Instructions for use Diagnosis    alcohol swab prep pads     100 each    Use to swab area of injection/jennifer as directed.    Type 2 diabetes mellitus without complication, unspecified long term insulin use status (H)       aspirin 81 MG EC tablet     90 tablet    Take 1 tablet (81 mg) by mouth daily    Type 2 diabetes mellitus with hyperosmolarity without coma, without long-term current use of insulin (H)       blood glucose monitoring lancets     100 each    Use to test blood sugar 2 times daily.    Type 2 diabetes mellitus without complication, unspecified long term insulin use status (H)       blood glucose  monitoring test strip    VI CONTOUR NEXT    100 strip    Use to test blood sugar 2 times daily.    Type 2 diabetes mellitus without complication, unspecified long term insulin use status (H)       lisinopril-hydrochlorothiazide 10-12.5 MG per tablet    PRINZIDE/ZESTORETIC    60 tablet    Take 1 tablet by mouth daily    Benign essential hypertension       metFORMIN 1000 MG tablet    GLUCOPHAGE    180 tablet    Take 1 tablet (1,000 mg) by mouth 2 times daily (with meals)    New onset type 2 diabetes mellitus (H), Type 2 diabetes mellitus with hyperosmolarity without coma, without long-term current use of insulin (H)       simvastatin 10 MG tablet    ZOCOR    90 tablet    Take 1 tablet (10 mg) by mouth At Bedtime    Type 2 diabetes mellitus with hyperosmolarity without coma, without long-term current use of insulin (H)

## 2018-07-10 LAB — HIV 1+2 AB+HIV1 P24 AG SERPL QL IA: NONREACTIVE

## 2018-07-12 DIAGNOSIS — E11.00 TYPE 2 DIABETES MELLITUS WITH HYPEROSMOLARITY WITHOUT COMA, WITHOUT LONG-TERM CURRENT USE OF INSULIN (H): Primary | ICD-10-CM

## 2018-07-12 DIAGNOSIS — Z23 NEED FOR PNEUMOCOCCAL VACCINATION: ICD-10-CM

## 2018-07-12 NOTE — PROGRESS NOTES
Please advise Shelbi Howard,  1960, that her lab results were hiv screen negative, normal metabolic panel (electrolytes and kidney function look good). HGB A1C is 6.2 improved from last visit this is great! Continue medications as you have been.   [phone] (home)   Thank you  Halie Hernandes CNP

## 2018-07-20 ENCOUNTER — TELEPHONE (OUTPATIENT)
Dept: FAMILY MEDICINE | Facility: OTHER | Age: 58
End: 2018-07-20

## 2018-07-20 NOTE — LETTER
[facility]  [id number] [place] [address]  Phone: [phone]  July 20, 2018      Shelbi Howard  [address] [facility] [address]      Dear Shelbi,    We care about your health and have reviewed your health plan including your medical conditions, medications, and lab results.  Based on this review, it is recommended that you follow up regarding the following health topic(s):  -Breast Cancer Screening    We recommend you take the following action(s):  -schedule a MAMMOGRAM which is due. Please disregard this reminder if you have had this exam elsewhere within the last 1-2 years please let us know so we can update your records.     Please call us at the [facility] - [phone] (or use [employer]) to address the above recommendations.     Thank you for trusting [facility] and we appreciate the opportunity to serve you.  We look forward to supporting your healthcare needs in the future.    Healthy Regards,    Your Health Care Team  [facility]

## 2018-07-20 NOTE — TELEPHONE ENCOUNTER
Summary:    Patient is due/failing the following:   MAMMOGRAM    Action needed:   Schedule a mammogram     Type of outreach:    Sent letter.    Questions for provider review:    None                                                                                                                                    Suziaaron Trinidad       Chart routed to Care Team .          Panel Management Review      Patient has the following on her problem list:     Diabetes    ASA: Passed    Last A1C  Lab Results   Component Value Date    A1C 6.2 07/09/2018    A1C 6.4 03/26/2018    A1C 9.0 12/05/2017     A1C tested: Passed    Last LDL:    Lab Results   Component Value Date    CHOL 125 12/08/2017     Lab Results   Component Value Date    HDL 46 12/08/2017     Lab Results   Component Value Date    LDL 54 12/08/2017     Lab Results   Component Value Date    TRIG 125 12/08/2017     No results found for: CHOLHDLRATIO  Lab Results   Component Value Date    NHDL 79 12/08/2017       Is the patient on a Statin? YES             Is the patient on Aspirin? YES    Medications     HMG CoA Reductase Inhibitors    simvastatin (ZOCOR) 10 MG tablet    Salicylates    aspirin 81 MG EC tablet          Last three blood pressure readings:  BP Readings from Last 3 Encounters:   07/09/18 148/90   04/06/18 152/72   03/26/18 142/86      Tobacco History:     History   Smoking Status     Former Smoker     Packs/day: 0.50     Years: 38.00     Types: Cigarettes     Quit date: 2/17/2017   Smokeless Tobacco     Never Used           Composite cancer screening  Chart review shows that this patient is due/due soon for the following Mammogram

## 2018-08-02 NOTE — TELEPHONE ENCOUNTER
Summary:    Patient is due/failing the following:   BP CHECK    Action needed:   Patient needs nurse only appointment.    Type of outreach:    Phone, left message for patient to call back.     Questions for provider review:    None                                                                                                                                    Suzi Trinidad       Chart routed to Care Team .        Panel Management Review      Patient has the following on her problem list:     Diabetes    ASA: Passed    Last A1C  Lab Results   Component Value Date    A1C 6.2 07/09/2018    A1C 6.4 03/26/2018    A1C 9.0 12/05/2017     A1C tested: Passed    Last LDL:    Lab Results   Component Value Date    CHOL 125 12/08/2017     Lab Results   Component Value Date    HDL 46 12/08/2017     Lab Results   Component Value Date    LDL 54 12/08/2017     Lab Results   Component Value Date    TRIG 125 12/08/2017     No results found for: CHOLHDLRATIO  Lab Results   Component Value Date    NHDL 79 12/08/2017       Is the patient on a Statin? YES             Is the patient on Aspirin? YES    Medications     HMG CoA Reductase Inhibitors    simvastatin (ZOCOR) 10 MG tablet    Salicylates    aspirin 81 MG EC tablet          Last three blood pressure readings:  BP Readings from Last 3 Encounters:   07/09/18 148/90   04/06/18 152/72   03/26/18 142/86            Tobacco History:     History   Smoking Status     Former Smoker     Packs/day: 0.50     Years: 38.00     Types: Cigarettes     Quit date: 2/17/2017   Smokeless Tobacco     Never Used         Hypertension   Last three blood pressure readings:  BP Readings from Last 3 Encounters:   07/09/18 148/90   04/06/18 152/72   03/26/18 142/86     Blood pressure: FAILED    HTN Guidelines:  Age 18-59 BP range:  Less than 140/90  Age 60-85 with Diabetes:  Less than 140/90  Age 60-85 without Diabetes:  less than 150/90      Composite cancer screening  Chart review shows that this patient  is due/due soon for the following Mammogram

## 2018-08-17 ENCOUNTER — TELEPHONE (OUTPATIENT)
Dept: FAMILY MEDICINE | Facility: OTHER | Age: 58
End: 2018-08-17

## 2018-08-29 ENCOUNTER — ALLIED HEALTH/NURSE VISIT (OUTPATIENT)
Dept: FAMILY MEDICINE | Facility: OTHER | Age: 58
End: 2018-08-29
Payer: COMMERCIAL

## 2018-08-29 VITALS — SYSTOLIC BLOOD PRESSURE: 100 MMHG | DIASTOLIC BLOOD PRESSURE: 70 MMHG

## 2018-08-29 DIAGNOSIS — Z01.30 BLOOD PRESSURE CHECK: Primary | ICD-10-CM

## 2018-08-29 PROCEDURE — 99207 ZZC NO CHARGE NURSE ONLY: CPT | Performed by: NURSE PRACTITIONER

## 2018-08-29 NOTE — MR AVS SNAPSHOT
After Visit Summary   8/29/2018    Shelbi Howard    MRN: [id number]           Patient Information     Date Of Birth          1960        Visit Information        Provider Department      8/29/2018 5:24 PM Halie Hernandes APRN CNP [facility]        Today's Diagnoses     Blood pressure check    -  1       Follow-ups after your visit        Your next 10 appointments already scheduled     Sep 12, 2018  2:00 PM CDT   Return Visit with BERLIN Dang CNP   [facility] ([facility]    [address] [place] [id number] [phone]              Who to contact     If you have questions or need follow up information about today's clinic visit or your schedule please contact [facility] directly at [phone].  Normal or non-critical lab and imaging results will be communicated to you by MyChart, letter or phone within 4 business days after the clinic has received the results. If you do not hear from us within 7 days, please contact the clinic through [employer] or phone. If you have a critical or abnormal lab result, we will notify you by phone as soon as possible.  Submit refill requests through [employer] or call your pharmacy and they will forward the refill request to us. Please allow 3 business days for your refill to be completed.          Additional Information About Your Visit        MyChart Information     [employer] gives you secure access to your electronic health record. If you see a primary care provider, you can also send messages to your care team and make appointments. If you have questions, please call your primary care clinic.  If you do not have a primary care provider, please call [phone] and they will assist you.        Care EveryWhere ID     This is your Care EveryWhere ID. This could be used by other organizations to access your [facility] medical records  [id number]         Blood  Pressure from Last 3 Encounters:   08/29/18 100/70   07/09/18 148/90   04/06/18 152/72    Weight from Last 3 Encounters:   07/09/18 182 lb 9.6 oz (82.8 kg)   03/26/18 180 lb (81.6 kg)   03/20/18 181 lb 3.5 oz (82.2 kg)              Today, you had the following     No orders found for display       Primary Care Provider Fax #    Physician No Ref-Primary [phone]       No address on file        Equal Access to Services     RUBY THORPE : Hadii aad ku hadasho Soomaali, waaxda luqadaha, qaybta kaalmada adeegyada, favian richardson . So [facility] [phone].    ATENCIÓN: Si habla español, tiene a hagen disposición servicios gratuitos de asistencia lingüística. Llame al [phone].    We comply with applicable federal civil rights laws and [place] laws. We do not discriminate on the basis of race, color, national origin, age, disability, sex, sexual orientation, or gender identity.            Thank you!     Thank you for choosing [facility]  for your care. Our goal is always to provide you with excellent care. Hearing back from our patients is one way we can continue to improve our services. Please take a few minutes to complete the written survey that you may receive in the mail after your visit with us. Thank you!             Your Updated Medication List - Protect others around you: Learn how to safely use, store and throw away your medicines at www.disposemymeds.org.          This list is accurate as of 8/29/18  5:29 PM.  Always use your most recent med list.                   Brand Name Dispense Instructions for use Diagnosis    alcohol swab prep pads     100 each    Use to swab area of injection/jennifer as directed.    Type 2 diabetes mellitus without complication, unspecified long term insulin use status (H)       aspirin 81 MG EC tablet     90 tablet    Take 1 tablet (81 mg) by mouth daily    Type 2 diabetes mellitus with hyperosmolarity without coma, without long-term current  use of insulin (H)       blood glucose monitoring lancets     100 each    Use to test blood sugar 2 times daily.    Type 2 diabetes mellitus without complication, unspecified long term insulin use status (H)       blood glucose monitoring test strip    VI CONTOUR NEXT    100 strip    Use to test blood sugar 2 times daily.    Type 2 diabetes mellitus without complication, unspecified long term insulin use status (H)       lisinopril-hydrochlorothiazide 10-12.5 MG per tablet    PRINZIDE/ZESTORETIC    60 tablet    Take 1 tablet by mouth daily    Benign essential hypertension       metFORMIN 1000 MG tablet    GLUCOPHAGE    180 tablet    Take 1 tablet (1,000 mg) by mouth 2 times daily (with meals)    New onset type 2 diabetes mellitus (H), Type 2 diabetes mellitus with hyperosmolarity without coma, without long-term current use of insulin (H)       simvastatin 10 MG tablet    ZOCOR    90 tablet    Take 1 tablet (10 mg) by mouth At Bedtime    Type 2 diabetes mellitus with hyperosmolarity without coma, without long-term current use of insulin (H)

## 2018-08-29 NOTE — PROGRESS NOTES
Shelbi Howard is enrolled/participating in the retail pharmacy Blood Pressure Goals Achievement Program (BPGAP).  Shelbi Howard was evaluated at [facility] on August 29, 2018 at which time her blood pressure was:    BP Readings from Last 3 Encounters:   08/29/18 100/70   07/09/18 148/90   04/06/18 152/72     Reviewed lifestyle modifications for blood pressure control and reduction: including making healthy food choices, managing weight, getting regular exercise, smoking cessation, reducing alcohol consumption, monitoring blood pressure regularly.     Shelbi Howard is not experiencing symptoms.    Follow-Up: BP is at goal of < 140/90mmHg (patient 18+ years of age with or without diabetes).  Recommended follow-up at pharmacy in 6 months.     Recommendation to Provider: none    Shelbi Howard was evaluated for enrollment into the PGEN study today.    Patient eligible for enrollment:  No  Patient interested in enrollment:  Yes    Completed by:   Rhiannon Reardon, PharmD   [facility] pharmacist on behalf of [facility].

## 2018-09-04 ENCOUNTER — MYC REFILL (OUTPATIENT)
Dept: FAMILY MEDICINE | Facility: OTHER | Age: 58
End: 2018-09-04

## 2018-09-04 DIAGNOSIS — I10 BENIGN ESSENTIAL HYPERTENSION: ICD-10-CM

## 2018-09-04 DIAGNOSIS — E11.00 TYPE 2 DIABETES MELLITUS WITH HYPEROSMOLARITY WITHOUT COMA, WITHOUT LONG-TERM CURRENT USE OF INSULIN (H): ICD-10-CM

## 2018-09-04 DIAGNOSIS — E11.9 NEW ONSET TYPE 2 DIABETES MELLITUS (H): ICD-10-CM

## 2018-09-04 NOTE — TELEPHONE ENCOUNTER
Message from Zuri:  Original authorizing provider: BERLIN Jones CNP    Shelbi Howard would like a refill of the following medications:  metFORMIN (GLUCOPHAGE) 1000 MG tablet [BERLIN Jones CNP]  simvastatin (ZOCOR) 10 MG tablet [BERLIN Jones CNP]  lisinopril-hydrochlorothiazide (PRINZIDE/ZESTORETIC) 10-12.5 MG per tablet [BERLIN Jones CNP]    Preferred pharmacy: [facility] [address] [place] [address]    Comment:

## 2018-09-05 RX ORDER — LISINOPRIL/HYDROCHLOROTHIAZIDE 10-12.5 MG
1 TABLET ORAL DAILY
Qty: 60 TABLET | Refills: 0 | Status: SHIPPED | OUTPATIENT
Start: 2018-09-05 | End: 2018-10-31

## 2018-09-05 RX ORDER — SIMVASTATIN 10 MG
10 TABLET ORAL AT BEDTIME
Qty: 90 TABLET | Refills: 0 | Status: SHIPPED | OUTPATIENT
Start: 2018-09-05 | End: 2018-12-16

## 2018-09-05 NOTE — TELEPHONE ENCOUNTER
Lisinopril-hydrochlorothiazide    Prescription approved per [facility] Refill Protocol.      Simvastatin    Prescription approved per [facility] Refill Protocol.      Metformin    Sent 12/8/2017 with 1 year supply. Refill not appropriate at this time.     Ryanne Borrego, RN, BSN

## 2018-09-06 ENCOUNTER — MYC REFILL (OUTPATIENT)
Dept: FAMILY MEDICINE | Facility: OTHER | Age: 58
End: 2018-09-06

## 2018-09-06 ENCOUNTER — MYC MEDICAL ADVICE (OUTPATIENT)
Dept: FAMILY MEDICINE | Facility: OTHER | Age: 58
End: 2018-09-06

## 2018-09-06 DIAGNOSIS — E11.9 NEW ONSET TYPE 2 DIABETES MELLITUS (H): ICD-10-CM

## 2018-09-06 DIAGNOSIS — I10 BENIGN ESSENTIAL HYPERTENSION: ICD-10-CM

## 2018-09-06 DIAGNOSIS — E11.00 TYPE 2 DIABETES MELLITUS WITH HYPEROSMOLARITY WITHOUT COMA, WITHOUT LONG-TERM CURRENT USE OF INSULIN (H): ICD-10-CM

## 2018-09-06 RX ORDER — SIMVASTATIN 10 MG
10 TABLET ORAL AT BEDTIME
Qty: 90 TABLET | Refills: 0 | OUTPATIENT
Start: 2018-09-06

## 2018-09-06 RX ORDER — LISINOPRIL/HYDROCHLOROTHIAZIDE 10-12.5 MG
1 TABLET ORAL DAILY
Qty: 60 TABLET | Refills: 0 | OUTPATIENT
Start: 2018-09-06

## 2018-09-06 NOTE — TELEPHONE ENCOUNTER
Message from Zuri:  Original authorizing provider: BERLIN Jones CNP    Shelbi Howard would like a refill of the following medications:  metFORMIN (GLUCOPHAGE) 1000 MG tablet [BERLIN Jones CNP]  simvastatin (ZOCOR) 10 MG tablet [BERLIN Jones CNP]  lisinopril-hydrochlorothiazide (PRINZIDE/ZESTORETIC) 10-12.5 MG per tablet [BERLIN Jones CNP]    Preferred pharmacy: [facility] [address] [place] [address]    Comment:

## 2018-09-06 NOTE — TELEPHONE ENCOUNTER
Lisinopril-hydrochlorothiazide and Simvastatin    Sent 9/5/2018. Refill not appropriate at this time.       Metformin    Sent 12/8/2017 with 1 year supply. Refill not appropriate at this time.     Ryanne Borrego RN, BSN

## 2018-09-10 ENCOUNTER — TELEPHONE (OUTPATIENT)
Dept: FAMILY MEDICINE | Facility: OTHER | Age: 58
End: 2018-09-10

## 2018-09-10 NOTE — TELEPHONE ENCOUNTER
9/10/2018    Attempt 3    Contacted patient in regards to scheduling VIP mammogram  Message on voicemail     Patient is also due for -     Comments: Clinic made prior attempt(s)        Outreach   Dana Cardoza

## 2018-09-10 NOTE — TELEPHONE ENCOUNTER
Spoke with [facility] pharmacy, patient has picked up Rx   Closing encounter  Selma Le RT (R)

## 2018-09-12 ENCOUNTER — ONCOLOGY VISIT (OUTPATIENT)
Dept: ONCOLOGY | Facility: CLINIC | Age: 58
End: 2018-09-12
Payer: COMMERCIAL

## 2018-09-12 VITALS
RESPIRATION RATE: 18 BRPM | DIASTOLIC BLOOD PRESSURE: 68 MMHG | TEMPERATURE: 98.1 F | OXYGEN SATURATION: 97 % | SYSTOLIC BLOOD PRESSURE: 112 MMHG | HEART RATE: 69 BPM | BODY MASS INDEX: 33.31 KG/M2 | WEIGHT: 181 LBS | HEIGHT: 62 IN

## 2018-09-12 DIAGNOSIS — C54.1 ENDOMETRIAL CARCINOMA (H): Primary | ICD-10-CM

## 2018-09-12 PROCEDURE — 99213 OFFICE O/P EST LOW 20 MIN: CPT | Performed by: NURSE PRACTITIONER

## 2018-09-12 ASSESSMENT — PAIN SCALES - GENERAL: PAINLEVEL: NO PAIN (0)

## 2018-09-12 NOTE — NURSING NOTE
"Oncology Rooming Note    September 12, 2018 2:05 PM   Shelbi Howard is a 58 year old female who presents for:    Chief Complaint   Patient presents with     Oncology Clinic Visit     6 month follow up     Initial Vitals: /68  Pulse 69  Temp 98.1  F (36.7  C)  Resp 18  Ht 1.575 m (5' 2\")  Wt 82.1 kg (181 lb)  SpO2 97%  BMI 33.11 kg/m2 Estimated body mass index is 33.11 kg/(m^2) as calculated from the following:    Height as of this encounter: 1.575 m (5' 2\").    Weight as of this encounter: 82.1 kg (181 lb). Body surface area is 1.9 meters squared.  No Pain (0) Comment: Data Unavailable   No LMP recorded. Patient is postmenopausal.  Allergies reviewed: Yes  Medications reviewed: Yes    Medications: Medication refills not needed today.  Pharmacy name entered into [employer]:    [facility] [address] [place], [place] - [address] [facility] [address] [place], [place] - [address]       5 minutes for nursing intake (face to face time)     Julieta Meyers LPN              "

## 2018-09-12 NOTE — PROGRESS NOTES
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 She is here today for a surveillance visit.      Oncology history:  10/17/17 Presented to PCP with postmenopausal bleeding.  US notable for EMS of 1.1 cm.  Endometrial biopsy with Grade 1 endometrioid adenocarcinoma     17: DaVinci Assisted Total Laparoscopic Hysterectomy, Right Salpingo-Oophorectomy, Lysis of Adhesions, Cystoscopy  SPECIMEN(S):   Cervix, uterus, right ovary fallopian tube     FINAL DIAGNOSIS:   CERVIX, UTERUS, RIGHT OVARY AND FALLOPIAN TUBE, HYSTERECTOMY AND RIGHT   SALPINGO-OOPHORECTOMY:   - Endometrial endometrioid adenocarcinoma, FIGO grade 1, arising in endometrial polyp with atypical hyperplasia   - Myometrium with no significant histologic abnormality   - Cervix with Nabothian cysts   - Ovary and fallopian tube with no significant histologic abnormality            MLH1 Expression:             - Loss of nuclear expression         MSH2 Expression:             - Intact nuclear expression         MSH6 Expression:             - Intact nuclear expression         PMS2 Expression:             - Loss of nuclear expression     MLH1 promoter methylation positive      Today she comes to clinic feeling well and denies any concerns for this visit. She denies any vaginal bleeding, no changes in her bowel or bladder habits, no nausea/emesis, no lower extremity edema, and no difficulties eating or sleeping. She denies any abdominal discomfort/bloating, no fevers or chills, and no chest pain or shortness of breath. She reports her ventral hernia remains soft.she is due for her mammogram and current with her annual and colonoscopy.she is not sexually active and denies any vaginal or vulvar dryness. Her HTN medication was increased and now her BPs remain well under  140 systolic. She does check her BP at home a couple times a week.      Review of Systems:    Systemic           no weight changes; no fever; no chills; no night sweats; no appetite changes  Skin           no rashes, or lesions  Eye           no irritation; no changes in vision  Gordon-Laryngeal           no dysphagia; no hoarseness   Pulmonary    no cough; no shortness of breath  Cardiovascular    no chest pain; no palpitations; + HTN  Gastrointestinal    no diarrhea; no constipation; no abdominal pain; no changes in bowel habits; no blood in stool  Genitourinary   no urinary frequency; no urinary urgency; no dysuria; no pain; no abnormal vaginal discharge; no abnormal vaginal bleeding  Breast    no breast discharge; no breast changes; no breast pain  Musculoskeletal    no myalgias; no arthralgias; no back pain  Psychiatric           no depressed mood; no anxiety    Hematologic               no tender lymph nodes; no noticeable swellings or lumps   Endocrine    no hot flashes; no heat/cold intolerance         Neurological   no tremor; no numbness and tingling; no headaches; no difficulty sleeping      Past Medical History:    Past Medical History:   Diagnosis Date     ASCUS of cervix with negative high risk HPV 10/17/2017     Diabetes (H)     oral meds     Endometrial cancer (H)      HTN (hypertension)      Migraine      Obesity          Past Surgical History:    Past Surgical History:   Procedure Laterality Date     CYSTOSCOPY N/A 12/5/2017    Procedure: CYSTOSCOPY;;  Surgeon: Facundo Parekh MD;  Location: [facility] OR     DAVINCI HYSTERECTOMY TOTAL, BILATERAL SALPINGO-OOPHORECTOMY, NODE DISSECTION, COMBINED N/A 12/5/2017    Procedure: COMBINED DAVINCI HYSTERECTOMY TOTAL, SALPINGO-OOPHORECTOMY, NODE DISSECTION;  DaVinci Assisted Total Laparoscopic Hysterectomy, Right Salpingo-Oophorectomy, Lysis of Adhesions, Cystoscopy;  Surgeon: Facundo Parekh MD;  Location: [facility] OR     LAPAROSCOPIC HYSTERECTOMY TOTAL  12/2017  "    oopherectomy Left     13 lb benign mass removal         Health Maintenance Due   Topic Date Due     EYE EXAM Q1 YEAR  01/29/1961     MAMMO SCREEN Q2 YR (SYSTEM ASSIGNED)  01/29/2008     ADVANCE DIRECTIVE PLANNING Q5 YRS  01/29/2013     PHQ-2 Q1 YR  02/01/2018     PNEUMO 5YR BOOST DUE AFTER AGE 65 (NO IB MSG) (1) 02/28/2018     INFLUENZA VACCINE (1) 09/01/2018       Current Medications:     Current Outpatient Prescriptions   Medication Sig Dispense Refill     alcohol swab prep pads Use to swab area of injection/jennifer as directed. 100 each 0     aspirin 81 MG EC tablet Take 1 tablet (81 mg) by mouth daily 90 tablet 3     blood glucose monitoring (VI CONTOUR NEXT) test strip Use to test blood sugar 2 times daily. 100 strip 3     blood glucose monitoring (VI MICROLET) lancets Use to test blood sugar 2 times daily. 100 each 3     lisinopril-hydrochlorothiazide (PRINZIDE/ZESTORETIC) 10-12.5 MG per tablet Take 1 tablet by mouth daily 60 tablet 0     metFORMIN (GLUCOPHAGE) 1000 MG tablet Take 1 tablet (1,000 mg) by mouth 2 times daily (with meals) 180 tablet 3     simvastatin (ZOCOR) 10 MG tablet Take 1 tablet (10 mg) by mouth At Bedtime 90 tablet 0         Allergies:        Allergies   Allergen Reactions     Latex Itching        Social History:     Social History   Substance Use Topics     Smoking status: Former Smoker     Packs/day: 0.50     Years: 38.00     Types: Cigarettes     Quit date: 2/17/2017     Smokeless tobacco: Never Used     Alcohol use Yes      Comment: 4 x times       History   Drug Use No         Family History:     The patient's family history is notable for:    Family History   Problem Relation Age of Onset     Diabetes Brother      Influenza/Pneumonia Father 62     KIDNEY DISEASE Sister          Physical Exam:     /68  Pulse 69  Temp 98.1  F (36.7  C)  Resp 18  Ht 1.575 m (5' 2\")  Wt 82.1 kg (181 lb)  SpO2 97%  BMI 33.11 kg/m2  Body mass index is 33.11 kg/(m^2).    General " Appearance: healthy and alert, no distress     HEENT: no thyromegaly, no palpable nodules or masses        Cardiovascular: regular rate and rhythm, no gallops, rubs or murmurs     Respiratory: lungs clear, no rales, rhonchi or wheezes, normal diaphragmatic excursion    Musculoskeletal: extremities non tender and without edema    Skin: no lesions or rashes     Neurological: normal gait, no gross defects     Psychiatric: appropriate mood and affect                               Hematological: normal cervical, supraclavicular and inguinal lymph nodes     Gastrointestinal:       abdomen soft, non-tender, non-distended, large soft ventral hernia    Genitourinary: External genitalia and urethral meatus appears normal.  Vagina is smooth without nodularity or masses.  Cervix surgically absent.  Bimanual exam reveal no masses, nodularity or fullness other than hernia--exam limited secondary to body habitus/hernia.  Recto-vaginal exam confirms these findings.      Assessment:    Shelbi Howard is a 58 year old woman with a diagnosis of stage IA grade 1 endometrioid endometrial adenocarcinoma. She is here today for a surveillance visit.    20 minutes were spent with this patient, over 50% of that time was spent in symptom management, treatment planning and in counseling and coordination of care.      Plan:     1.)        Patient to RTC in 6 months for her next surveillance visit. She will continue to be seen every 6 months until 12/2019. Reviewed signs and symptoms for when she should contact the clinic or seek additional care. Patient to contact the clinic with any questions or concerns in the interim. Reviewed recommendations from [facility] not to perform surveillance pap smears in women diagnosed with endometrial cancer as this does not improve detection of local recurrence.     2.) Genetic risk factors were assessed and MLH1 promoter methylation positive    3.) Labs and/or tests ordered include:  None.      4.) Health maintenance  issues addressed today include annual health maintenance and non-gynecologic issues with PCP.    BERLIN Conde, WHNP-BC, ANP-BC  Women's Health Nurse Practitioner  Adult Nurse Pracitioner  Division of Gynecologic Oncology          CC  Patient Care Team:  No Ref-Primary, Physician as PCP - General

## 2018-09-12 NOTE — MR AVS SNAPSHOT
After Visit Summary   9/12/2018    Shelbi Howard    MRN: [id number]           Patient Information     Date Of Birth          1960        Visit Information        Provider Department      9/12/2018 2:00 PM Michelle Clark APRN [facility]        Care Instructions    Preventive Care:    Breast Cancer Screening: During our visit today, we discussed that it is recommended you receive breast cancer screening. Please call or make an appointment with your primary care provider to discuss this with them. You may also call the [facility] scheduling line ([phone]) to set up a mammography appointment at the Breast Center within the [facility]. Scheduled 9/20/18 [facility]    Preventive Care:    Diabetic Eye Exam Screening: During our visit today, we discussed that it is recommended you receive diabetic eye exam screening. Please call or make an appointment with your primary care provider to discuss this with them. You may also call the [facility] scheduling line ([phone]) to set up an eye exam at one of the [facility]. [facility] in [place] 4/18 Nl will have results sent.                  Follow-ups after your visit        Follow-up notes from your care team     Return in about 6 months (around 3/12/2019).      Your next 10 appointments already scheduled     Sep 20, 2018  4:30 PM CDT   MA SCREENING DIGITAL BILATERAL with ERMA1   [facility] ([facility])    [address] [place] [id number] [phone]           Do not use any powder, lotion or deodorant under your arms or on your breast. If you do, we will ask you to remove it before your exam.  Wear comfortable, two-piece clothing.  If you have any allergies, tell your care team.  Bring any previous mammograms from other facilities or have them mailed to the breast center.            Mar 13, 2019  2:00 PM CDT   Return Visit with Michelle  BERLIN Lowery CNP   [facility] ([facility])    [address] [address] [place] [id number] [phone]              Who to contact     If you have questions or need follow up information about today's clinic visit or your schedule please contact [facility] directly at [phone].  Normal or non-critical lab and imaging results will be communicated to you by GrandCam[employer]art, letter or phone within 4 business days after the clinic has received the results. If you do not hear from us within 7 days, please contact the clinic through [employer]Cam[employer]art or phone. If you have a critical or abnormal lab result, we will notify you by phone as soon as possible.  Submit refill requests through [employer] or call your pharmacy and they will forward the refill request to us. Please allow 3 business days for your refill to be completed.          Additional Information About Your Visit        GrandCampharDeskActive Information     Specialized [employer] gives you secure access to your electronic health record. If you see a primary care provider, you can also send messages to your care team and make appointments. If you have questions, please call your primary care clinic.  If you do not have a primary care provider, please call [phone] and they will assist you.      Specialized Tech is an electronic gateway that provides easy, online access to your medical records. With [employer]ized [employer], you can request a clinic appointment, read your test results, renew a prescription or communicate with your care team.     To access your existing account, please contact your [facility] or call [phone] for assistance.        Care EveryWhere ID     This is your Care EveryWhere ID. This could be used by other organizations to access your [facility] medical records  [id number]        Your Vitals Were     Pulse Temperature Respirations Height Pulse Oximetry BMI (Body Mass Index)    69 98.1  F (36.7  C) 18 1.575 m  "(5' 2\") 97% 33.11 kg/m2       Blood Pressure from Last 3 Encounters:   09/12/18 112/68   08/29/18 100/70   07/09/18 148/90    Weight from Last 3 Encounters:   09/12/18 82.1 kg (181 lb)   07/09/18 82.8 kg (182 lb 9.6 oz)   03/26/18 81.6 kg (180 lb)              Today, you had the following     No orders found for display       Primary Care Provider Fax #    Physician No Ref-Primary [phone]       No address on file        Equal Access to Services     [facility]: Hadlety Holder, warei mejia, nathalie johnsonalannetta parikh, favian richardson . So [facility] [phone].    ATENCIÓN: Si habla español, tiene a hagen disposición servicios gratuitos de asistencia lingüística. Llame al [phone].    We comply with applicable federal civil rights laws and [place] laws. We do not discriminate on the basis of race, color, national origin, age, disability, sex, sexual orientation, or gender identity.            Thank you!     Thank you for choosing [facility]  for your care. Our goal is always to provide you with excellent care. Hearing back from our patients is one way we can continue to improve our services. Please take a few minutes to complete the written survey that you may receive in the mail after your visit with us. Thank you!             Your Updated Medication List - Protect others around you: Learn how to safely use, store and throw away your medicines at www.disposemymeds.org.          This list is accurate as of 9/12/18  2:40 PM.  Always use your most recent med list.                   Brand Name Dispense Instructions for use Diagnosis    alcohol swab prep pads     100 each    Use to swab area of injection/jennifer as directed.    Type 2 diabetes mellitus without complication, unspecified long term insulin use status (H)       aspirin 81 MG EC tablet     90 tablet    Take 1 tablet (81 mg) by mouth daily    Type 2 diabetes mellitus with hyperosmolarity without " coma, without long-term current use of insulin (H)       blood glucose monitoring lancets     100 each    Use to test blood sugar 2 times daily.    Type 2 diabetes mellitus without complication, unspecified long term insulin use status (H)       blood glucose monitoring test strip    VI CONTOUR NEXT    100 strip    Use to test blood sugar 2 times daily.    Type 2 diabetes mellitus without complication, unspecified long term insulin use status (H)       lisinopril-hydrochlorothiazide 10-12.5 MG per tablet    PRINZIDE/ZESTORETIC    60 tablet    Take 1 tablet by mouth daily    Benign essential hypertension       metFORMIN 1000 MG tablet    GLUCOPHAGE    180 tablet    Take 1 tablet (1,000 mg) by mouth 2 times daily (with meals)    New onset type 2 diabetes mellitus (H), Type 2 diabetes mellitus with hyperosmolarity without coma, without long-term current use of insulin (H)       simvastatin 10 MG tablet    ZOCOR    90 tablet    Take 1 tablet (10 mg) by mouth At Bedtime    Type 2 diabetes mellitus with hyperosmolarity without coma, without long-term current use of insulin (H)

## 2018-09-12 NOTE — PATIENT INSTRUCTIONS
Preventive Care:    Breast Cancer Screening: During our visit today, we discussed that it is recommended you receive breast cancer screening. Please call or make an appointment with your primary care provider to discuss this with them. You may also call the [facility] scheduling line ([phone]) to set up a mammography appointment at the Breast Center within the [facility]. Scheduled 9/20/18 [facility]    Preventive Care:    Diabetic Eye Exam Screening: During our visit today, we discussed that it is recommended you receive diabetic eye exam screening. Please call or make an appointment with your primary care provider to discuss this with them. You may also call the [facility] scheduling line ([phone]) to set up an eye exam at one of the [facility]. [facility] in [place] 4/18 Nl will have results sent.

## 2018-09-12 NOTE — LETTER
2018         RE: Shelbi Howard  [address] [address] [facility] [address]        Dear Colleague,    Thank you for referring your patient, Shelbi Howard, to the [facility]. Please see a copy of my visit note below.                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 She is here today for a surveillance visit.      Oncology history:  10/17/17 Presented to PCP with postmenopausal bleeding.  US notable for EMS of 1.1 cm.  Endometrial biopsy with Grade 1 endometrioid adenocarcinoma     17: DaVinci Assisted Total Laparoscopic Hysterectomy, Right Salpingo-Oophorectomy, Lysis of Adhesions, Cystoscopy  SPECIMEN(S):   Cervix, uterus, right ovary fallopian tube     FINAL DIAGNOSIS:   CERVIX, UTERUS, RIGHT OVARY AND FALLOPIAN TUBE, HYSTERECTOMY AND RIGHT   SALPINGO-OOPHORECTOMY:   - Endometrial endometrioid adenocarcinoma, FIGO grade 1, arising in endometrial polyp with atypical hyperplasia   - Myometrium with no significant histologic abnormality   - Cervix with Nabothian cysts   - Ovary and fallopian tube with no significant histologic abnormality            MLH1 Expression:             - Loss of nuclear expression         MSH2 Expression:             - Intact nuclear expression         MSH6 Expression:             - Intact nuclear expression         PMS2 Expression:             - Loss of nuclear expression     MLH1 promoter methylation positive      Today she comes to clinic feeling well and denies any concerns for this visit. She denies any vaginal bleeding, no changes in her bowel or bladder habits, no nausea/emesis, no lower extremity edema, and no difficulties eating or sleeping. She denies any abdominal discomfort/bloating, no fevers or chills, and no chest pain or shortness of breath. She reports  her ventral hernia remains soft.she is due for her mammogram and current with her annual and colonoscopy.she is not sexually active and denies any vaginal or vulvar dryness. Her HTN medication was increased and now her BPs remain well under 140 systolic. She does check her BP at home a couple times a week.      Review of Systems:    Systemic           no weight changes; no fever; no chills; no night sweats; no appetite changes  Skin           no rashes, or lesions  Eye           no irritation; no changes in vision  Gordon-Laryngeal           no dysphagia; no hoarseness   Pulmonary    no cough; no shortness of breath  Cardiovascular    no chest pain; no palpitations; + HTN  Gastrointestinal    no diarrhea; no constipation; no abdominal pain; no changes in bowel habits; no blood in stool  Genitourinary   no urinary frequency; no urinary urgency; no dysuria; no pain; no abnormal vaginal discharge; no abnormal vaginal bleeding  Breast    no breast discharge; no breast changes; no breast pain  Musculoskeletal    no myalgias; no arthralgias; no back pain  Psychiatric           no depressed mood; no anxiety    Hematologic               no tender lymph nodes; no noticeable swellings or lumps   Endocrine    no hot flashes; no heat/cold intolerance         Neurological   no tremor; no numbness and tingling; no headaches; no difficulty sleeping      Past Medical History:    Past Medical History:   Diagnosis Date     ASCUS of cervix with negative high risk HPV 10/17/2017     Diabetes (H)     oral meds     Endometrial cancer (H)      HTN (hypertension)      Migraine      Obesity          Past Surgical History:    Past Surgical History:   Procedure Laterality Date     CYSTOSCOPY N/A 12/5/2017    Procedure: CYSTOSCOPY;;  Surgeon: Facundo Parekh MD;  Location: [facility] OR     DAVINCI HYSTERECTOMY TOTAL, BILATERAL SALPINGO-OOPHORECTOMY, NODE DISSECTION, COMBINED N/A 12/5/2017    Procedure: COMBINED DAVINCI HYSTERECTOMY TOTAL,  SALPINGO-OOPHORECTOMY, NODE DISSECTION;  DaVinci Assisted Total Laparoscopic Hysterectomy, Right Salpingo-Oophorectomy, Lysis of Adhesions, Cystoscopy;  Surgeon: Facundo Parekh MD;  Location: [facility] OR     LAPAROSCOPIC HYSTERECTOMY TOTAL  12/2017     oopherectomy Left     13 lb benign mass removal         Health Maintenance Due   Topic Date Due     EYE EXAM Q1 YEAR  01[birth year]29/1961     MAMMO SCREEN Q2 YR (SYSTEM ASSIGNED)  01/29/2008     ADVANCE DIRECTIVE PLANNING Q5 YRS  01/29/2013     PHQ-2 Q1 YR  02/01/2018     PNEUMO 5YR BOOST DUE AFTER AGE 65 (NO IB MSG) (1) 02/28/2018     INFLUENZA VACCINE (1) 09/01/2018       Current Medications:     Current Outpatient Prescriptions   Medication Sig Dispense Refill     alcohol swab prep pads Use to swab area of injection/jennifer as directed. 100 each 0     aspirin 81 MG EC tablet Take 1 tablet (81 mg) by mouth daily 90 tablet 3     blood glucose monitoring (VI CONTOUR NEXT) test strip Use to test blood sugar 2 times daily. 100 strip 3     blood glucose monitoring (VI MICROLET) lancets Use to test blood sugar 2 times daily. 100 each 3     lisinopril-hydrochlorothiazide (PRINZIDE/ZESTORETIC) 10-12.5 MG per tablet Take 1 tablet by mouth daily 60 tablet 0     metFORMIN (GLUCOPHAGE) 1000 MG tablet Take 1 tablet (1,000 mg) by mouth 2 times daily (with meals) 180 tablet 3     simvastatin (ZOCOR) 10 MG tablet Take 1 tablet (10 mg) by mouth At Bedtime 90 tablet 0         Allergies:        Allergies   Allergen Reactions     Latex Itching        Social History:     Social History   Substance Use Topics     Smoking status: Former Smoker     Packs/day: 0.50     Years: 38.00     Types: Cigarettes     Quit date: 2/17/2017     Smokeless tobacco: Never Used     Alcohol use Yes      Comment: 4 x times       History   Drug Use No         Family History:     The patient's family history is notable for:    Family History   Problem Relation Age of Onset     Diabetes Brother       "Influenza/Pneumonia Father 62     KIDNEY DISEASE Sister          Physical Exam:     /68  Pulse 69  Temp 98.1  F (36.7  C)  Resp 18  Ht 1.575 m (5' 2\")  Wt 82.1 kg (181 lb)  SpO2 97%  BMI 33.11 kg/m2  Body mass index is 33.11 kg/(m^2).    General Appearance: healthy and alert, no distress     HEENT: no thyromegaly, no palpable nodules or masses        Cardiovascular: regular rate and rhythm, no gallops, rubs or murmurs     Respiratory: lungs clear, no rales, rhonchi or wheezes, normal diaphragmatic excursion    Musculoskeletal: extremities non tender and without edema    Skin: no lesions or rashes     Neurological: normal gait, no gross defects     Psychiatric: appropriate mood and affect                               Hematological: normal cervical, supraclavicular and inguinal lymph nodes     Gastrointestinal:       abdomen soft, non-tender, non-distended, large soft ventral hernia    Genitourinary: External genitalia and urethral meatus appears normal.  Vagina is smooth without nodularity or masses.  Cervix surgically absent.  Bimanual exam reveal no masses, nodularity or fullness other than hernia--exam limited secondary to body habitus/hernia.  Recto-vaginal exam confirms these findings.      Assessment:    Shelbi Howard is a 58 year old woman with a diagnosis of stage IA grade 1 endometrioid endometrial adenocarcinoma. She is here today for a surveillance visit.    20 minutes were spent with this patient, over 50% of that time was spent in symptom management, treatment planning and in counseling and coordination of care.      Plan:     1.)        Patient to RTC in 6 months for her next surveillance visit. She will continue to be seen every 6 months until 12/2019. Reviewed signs and symptoms for when she should contact the clinic or seek additional care. Patient to contact the clinic with any questions or concerns in the interim. Reviewed recommendations from [facility] not to perform surveillance pap " smears in women diagnosed with endometrial cancer as this does not improve detection of local recurrence.     2.) Genetic risk factors were assessed and MLH1 promoter methylation positive    3.) Labs and/or tests ordered include:  None.      4.) Health maintenance issues addressed today include annual health maintenance and non-gynecologic issues with PCP.    BERLIN Conde, WHNP-BC, ANP-BC  Women's Health Nurse Practitioner  Adult Nurse Pracitioner  Division of Gynecologic Oncology          CC  Patient Care Team:  No Ref-Primary, Physician as PCP - General      Again, thank you for allowing me to participate in the care of your patient.        Sincerely,        BERLIN Dang CNP

## 2018-09-20 ENCOUNTER — RADIANT APPOINTMENT (OUTPATIENT)
Dept: MAMMOGRAPHY | Facility: OTHER | Age: 58
End: 2018-09-20
Payer: COMMERCIAL

## 2018-09-20 DIAGNOSIS — Z12.31 VISIT FOR SCREENING MAMMOGRAM: ICD-10-CM

## 2018-09-20 PROCEDURE — 77067 SCR MAMMO BI INCL CAD: CPT | Mod: TC

## 2018-09-24 NOTE — PROGRESS NOTES
Please advise Shelbi Howard,  1960, that her Mammogram results were negative she should also receive letter  [phone] (home)   Thank you  Halie Hernandes CNP

## 2018-09-28 NOTE — PROGRESS NOTES
SUBJECTIVE:   Shelbi Howard is a 58 year old female who presents to clinic today for the following health issues:      HPI  Diabetes Follow-up  Patient is checking blood sugars: twice daily.  In am 120-140 prior to breakfast at night    Blood sugar testing frequency justification: pt preference    Diabetic concerns: None     Symptoms of hypoglycemia (low blood sugar): none     Paresthesias (numbness or burning in feet) or sores: No     Date of last diabetic eye exam: [facility] Feb 2018    Will be due for labs in December.    Diabetes Management Resources    Hyperlipidemia Follow-Up  Doing well will be due for lab recheck in December    Rate your low fat/cholesterol diet?: fair    Taking statin?  Yes, no muscle aches from statin    Other lipid medications/supplements?:  none    Hypertension Follow-up      Outpatient blood pressures are being checked at pharmacy     Low Salt Diet: no added salt    BP Readings from Last 2 Encounters:   09/12/18 112/68   08/29/18 100/70     Hemoglobin A1C (%)   Date Value   07/09/2018 6.2 (H)   03/26/2018 6.4 (H)     LDL Cholesterol Calculated (mg/dL)   Date Value   12/08/2017 54     Problem list and histories reviewed & adjusted, as indicated.  Additional history: as documented        Patient Active Problem List   Diagnosis     Post-menopausal bleeding     ASCUS of cervix with negative high risk HPV     Endometrial carcinoma (H)     S/P hysterectomy     New onset type 2 diabetes mellitus (H)     Type 2 diabetes mellitus with hyperosmolarity without coma, without long-term current use of insulin (H)     Past Surgical History:   Procedure Laterality Date     CYSTOSCOPY N/A 12/5/2017    Procedure: CYSTOSCOPY;;  Surgeon: Facundo Parekh MD;  Location: [facility] OR     DAVINCI HYSTERECTOMY TOTAL, BILATERAL SALPINGO-OOPHORECTOMY, NODE DISSECTION, COMBINED N/A 12/5/2017    Procedure: COMBINED DAVINCI HYSTERECTOMY TOTAL, SALPINGO-OOPHORECTOMY, NODE DISSECTION;  DaVinci Assisted Total  Laparoscopic Hysterectomy, Right Salpingo-Oophorectomy, Lysis of Adhesions, Cystoscopy;  Surgeon: Facundo Parekh MD;  Location: [facility] OR     LAPAROSCOPIC HYSTERECTOMY TOTAL  12/2017     oopherectomy Left     13 lb benign mass removal       Social History   Substance Use Topics     Smoking status: Former Smoker     Packs/day: 0.50     Years: 38.00     Types: Cigarettes     Quit date: 2/17/2017     Smokeless tobacco: Never Used     Alcohol use Yes      Comment: 4 x times     Family History   Problem Relation Age of Onset     Diabetes Brother      Influenza/Pneumonia Father 62     KIDNEY DISEASE Sister          Current Outpatient Prescriptions   Medication Sig Dispense Refill     alcohol swab prep pads Use to swab area of injection/jennifer as directed. 100 each 0     aspirin 81 MG EC tablet Take 1 tablet (81 mg) by mouth daily 90 tablet 3     blood glucose monitoring (VI CONTOUR NEXT) test strip Use to test blood sugar 2 times daily. 100 strip 3     blood glucose monitoring (VI MICROLET) lancets Use to test blood sugar 2 times daily. 100 each 3     lisinopril-hydrochlorothiazide (PRINZIDE/ZESTORETIC) 10-12.5 MG per tablet Take 1 tablet by mouth daily 60 tablet 0     metFORMIN (GLUCOPHAGE) 1000 MG tablet Take 1 tablet (1,000 mg) by mouth 2 times daily (with meals) 180 tablet 3     simvastatin (ZOCOR) 10 MG tablet Take 1 tablet (10 mg) by mouth At Bedtime 90 tablet 0     Allergies   Allergen Reactions     Latex Itching     Recent Labs   Lab Test  07/09/18   1725  03/26/18   1752  12/08/17   1458   12/05/17 2003   A1C  6.2*  6.4*   --    --   9.0*   LDL   --    --   54   --    --    HDL   --    --   46*   --    --    TRIG   --    --   125   --    --    CR  0.72  0.59   --    < >   --    GFRESTIMATED  82  >90   --    < >   --    GFRESTBLACK  >90  >90   --    < >   --    POTASSIUM  4.4  3.9   --    < >   --    TSH   --   0.53  0.36*   --    --     < > = values in this interval not displayed.      BP Readings from  "Last 3 Encounters:   10/09/18 118/74   09/12/18 112/68   08/29/18 100/70    Wt Readings from Last 3 Encounters:   10/09/18 182 lb (82.6 kg)   09/12/18 181 lb (82.1 kg)   07/09/18 182 lb 9.6 oz (82.8 kg)                  Labs reviewed in EPIC    ROS:  Constitutional, HEENT, cardiovascular, pulmonary, gi and gu systems are negative, except as otherwise noted.    OBJECTIVE:     /74  Pulse 70  Temp 98.4  F (36.9  C) (Oral)  Resp 18  Ht 5' 2\" (1.575 m)  Wt 182 lb (82.6 kg)  BMI 33.29 kg/m2  Body mass index is 33.29 kg/(m^2).  GENERAL: healthy, alert and no distress  NECK: no adenopathy, no asymmetry, masses, or scars and thyroid normal to palpation  RESP: lungs clear to auscultation - no rales, rhonchi or wheezes  CV: regular rate and rhythm, normal S1 S2, no S3 or S4, no murmur, click or rub, no peripheral edema and peripheral pulses strong  ABDOMEN: soft, nontender, no hepatosplenomegaly, no masses and bowel sounds normal  MS: no gross musculoskeletal defects noted, no edema  NEURO: Normal strength and tone, mentation intact and speech normal  BACK: no CVA tenderness, no paralumbar tenderness  PSYCH: mentation appears normal, affect normal/bright    ASSESSMENT/PLAN:       1. Type 2 diabetes mellitus with hyperosmolarity without coma, without long-term current use of insulin (H)  Doing well currently does not need refills of metformin at this time     2. Benign essential hypertension  Doing well currently does not need refill of lisinopril/HCTZ at this time     3. Mixed hyperlipidemia  Doing well currently does not need refill of Simvastatin at this time    4. Need for prophylactic vaccination against Streptococcus pneumoniae (pneumococcus)  given    5. Need for vaccination  given  - Pneumococcal vaccine 23 valent PPSV23  (Pneumovax) [42446]  - ADMIN: Vaccine, Initial (70433)    6. ACP (advance care planning)  Handout given and discussed.   - HONORING CHOICES REFERRAL    Will return to clinic in Dec. 2018 " for diabetic labs    BERLIN Jones [facility]

## 2018-10-09 ENCOUNTER — OFFICE VISIT (OUTPATIENT)
Dept: FAMILY MEDICINE | Facility: OTHER | Age: 58
End: 2018-10-09
Payer: COMMERCIAL

## 2018-10-09 VITALS
HEART RATE: 70 BPM | TEMPERATURE: 98.4 F | BODY MASS INDEX: 33.49 KG/M2 | DIASTOLIC BLOOD PRESSURE: 74 MMHG | SYSTOLIC BLOOD PRESSURE: 118 MMHG | WEIGHT: 182 LBS | HEIGHT: 62 IN | RESPIRATION RATE: 18 BRPM

## 2018-10-09 DIAGNOSIS — E11.00 TYPE 2 DIABETES MELLITUS WITH HYPEROSMOLARITY WITHOUT COMA, WITHOUT LONG-TERM CURRENT USE OF INSULIN (H): Primary | ICD-10-CM

## 2018-10-09 DIAGNOSIS — I10 BENIGN ESSENTIAL HYPERTENSION: ICD-10-CM

## 2018-10-09 DIAGNOSIS — Z23 NEED FOR PROPHYLACTIC VACCINATION AGAINST STREPTOCOCCUS PNEUMONIAE (PNEUMOCOCCUS): ICD-10-CM

## 2018-10-09 DIAGNOSIS — Z23 NEED FOR VACCINATION: ICD-10-CM

## 2018-10-09 DIAGNOSIS — E78.2 MIXED HYPERLIPIDEMIA: ICD-10-CM

## 2018-10-09 DIAGNOSIS — Z71.89 ACP (ADVANCE CARE PLANNING): ICD-10-CM

## 2018-10-09 PROCEDURE — 90471 IMMUNIZATION ADMIN: CPT | Performed by: NURSE PRACTITIONER

## 2018-10-09 PROCEDURE — 99214 OFFICE O/P EST MOD 30 MIN: CPT | Mod: 25 | Performed by: NURSE PRACTITIONER

## 2018-10-09 PROCEDURE — 90732 PPSV23 VACC 2 YRS+ SUBQ/IM: CPT | Performed by: NURSE PRACTITIONER

## 2018-10-09 NOTE — NURSING NOTE
Screening Questionnaire for Adult Immunization    Are you sick today?   No   Do you have allergies to medications, food, a vaccine component or latex?   No   Have you ever had a serious reaction after receiving a vaccination?   No   Do you have a long-term health problem with heart disease, lung disease, asthma, kidney disease, metabolic disease (e.g. diabetes), anemia, or other blood disorder?   No   Do you have cancer, leukemia, HIV/AIDS, or any other immune system problem?   No   In the past 3 months, have you taken medications that affect  your immune system, such as prednisone, other steroids, or anticancer drugs; drugs for the treatment of rheumatoid arthritis, Crohn s disease, or psoriasis; or have you had radiation treatments?   No   Have you had a seizure, or a brain or other nervous system problem?   No   During the past year, have you received a transfusion of blood or blood     products, or been given immune (gamma) globulin or antiviral drug?   No   For women: Are you pregnant or is there a chance you could become        pregnant during the next month?   No   Have you received any vaccinations in the past 4 weeks?   No     Immunization questionnaire answers were all negative.      Prior to injection verified patient identity using patient's name and date of birth.  Due to injection administration, patient instructed to remain in clinic for 15 minutes  afterwards, and to report any adverse reaction to me immediately.         Screening performed by Sonia Guerrero on 10/9/2018 at 5:13 PM.

## 2018-10-09 NOTE — MR AVS SNAPSHOT
After Visit Summary   10/9/2018    Shelbi Howard    MRN: [id number]           Patient Information     Date Of Birth          1960        Visit Information        Provider Department      10/9/2018 4:20 PM Halie Hernandes APRN CNP [facility]        Today's Diagnoses     Type 2 diabetes mellitus with hyperosmolarity without coma, without long-term current use of insulin (H)    -  1    Benign essential hypertension        Mixed hyperlipidemia        Need for prophylactic vaccination against Streptococcus pneumoniae (pneumococcus)        Need for vaccination        ACP (advance care planning)           Follow-ups after your visit        Additional Services     [facility] REFERRAL       Your provider has referred you to Outpatient [facility] Advance Care Planning Facilitator or Serious illness clinic support staff. The facilitator or support staff will contact you to schedule the appointment or for the follow up call    Reason for Referral: handout given                  Follow-up notes from your care team     Return in about 3 months (around 1/9/2019).      Your next 10 appointments already scheduled     Mar 13, 2019  2:00 PM CDT   Return Visit with BERLIN Dang CNP   [facility] [[email or web address] ([facility] [[email or web address]])    [address] [place] [id number] [phone]              Who to contact     If you have questions or need follow up information about today's clinic visit or your schedule please contact [facility] directly at [phone].  Normal or non-critical lab and imaging results will be communicated to you by MyChart, letter or phone within 4 business days after the clinic has received the results. If you do not hear from us within 7 days, please contact the clinic through MyChart or phone. If you have a critical or abnormal lab result, we will notify you by phone as soon as possible.  Submit  "refill requests through [employer] or call your pharmacy and they will forward the refill request to us. Please allow 3 business days for your refill to be completed.          Additional Information About Your Visit        [employer] Information     [employer] gives you secure access to your electronic health record. If you see a primary care provider, you can also send messages to your care team and make appointments. If you have questions, please call your primary care clinic.  If you do not have a primary care provider, please call [phone] and they will assist you.        Care EveryWhere ID     This is your Care EveryWhere ID. This could be used by other organizations to access your [place] medical records  [id number]        Your Vitals Were     Pulse Temperature Respirations Height BMI (Body Mass Index)       70 98.4  F (36.9  C) (Oral) 18 5' 2\" (1.575 m) 33.29 kg/m2        Blood Pressure from Last 3 Encounters:   10/09/18 118/74   09/12/18 112/68   08/29/18 100/70    Weight from Last 3 Encounters:   10/09/18 182 lb (82.6 kg)   09/12/18 181 lb (82.1 kg)   07/09/18 182 lb 9.6 oz (82.8 kg)              We Performed the Following     ADMIN: Vaccine, Initial (70715)     HONORING CHOICES REFERRAL     Pneumococcal vaccine 23 valent PPSV23  (Pneumovax) [26630]        Primary Care Provider Fax #    Physician No Ref-Primary [phone]       No address on file        Equal Access to Services     [facility] : Hadii sydnee Holder, waaxda luqadaha, qaybta kaalmada kati, favian richardson . So [facility] [phone].    ATENCIÓN: Si habla español, tiene a hagen disposición servicios gratuitos de asistencia lingüística. Llame al [phone].    We comply with applicable federal civil rights laws and [place] laws. We do not discriminate on the basis of race, color, national origin, age, disability, sex, sexual orientation, or gender identity.            Thank you!     Thank you for choosing " [facility]  for your care. Our goal is always to provide you with excellent care. Hearing back from our patients is one way we can continue to improve our services. Please take a few minutes to complete the written survey that you may receive in the mail after your visit with us. Thank you!             Your Updated Medication List - Protect others around you: Learn how to safely use, store and throw away your medicines at www.disposemymeds.org.          This list is accurate as of 10/9/18 11:59 PM.  Always use your most recent med list.                   Brand Name Dispense Instructions for use Diagnosis    alcohol swab prep pads     100 each    Use to swab area of injection/jennifer as directed.    Type 2 diabetes mellitus without complication, unspecified long term insulin use status       aspirin 81 MG EC tablet     90 tablet    Take 1 tablet (81 mg) by mouth daily    Type 2 diabetes mellitus with hyperosmolarity without coma, without long-term current use of insulin (H)       blood glucose monitoring lancets     100 each    Use to test blood sugar 2 times daily.    Type 2 diabetes mellitus without complication, unspecified long term insulin use status       blood glucose monitoring test strip    VI CONTOUR NEXT    100 strip    Use to test blood sugar 2 times daily.    Type 2 diabetes mellitus without complication, unspecified long term insulin use status       lisinopril-hydrochlorothiazide 10-12.5 MG per tablet    PRINZIDE/ZESTORETIC    60 tablet    Take 1 tablet by mouth daily    Benign essential hypertension       metFORMIN 1000 MG tablet    GLUCOPHAGE    180 tablet    Take 1 tablet (1,000 mg) by mouth 2 times daily (with meals)    New onset type 2 diabetes mellitus (H), Type 2 diabetes mellitus with hyperosmolarity without coma, without long-term current use of insulin (H)       simvastatin 10 MG tablet    ZOCOR    90 tablet    Take 1 tablet (10 mg) by mouth At Bedtime    Type 2 diabetes  mellitus with hyperosmolarity without coma, without long-term current use of insulin (H)

## 2018-10-31 ENCOUNTER — MYC REFILL (OUTPATIENT)
Dept: FAMILY MEDICINE | Facility: OTHER | Age: 58
End: 2018-10-31

## 2018-10-31 DIAGNOSIS — I10 BENIGN ESSENTIAL HYPERTENSION: ICD-10-CM

## 2018-11-01 RX ORDER — LISINOPRIL/HYDROCHLOROTHIAZIDE 10-12.5 MG
1 TABLET ORAL DAILY
Qty: 90 TABLET | Refills: 1 | Status: SHIPPED | OUTPATIENT
Start: 2018-11-01 | End: 2019-04-26

## 2018-11-01 NOTE — TELEPHONE ENCOUNTER
Message from MyChart:  Original authorizing provider: BERLIN Jones CNP    Shelbi Howard would like a refill of the following medications:  lisinopril-hydrochlorothiazide (PRINZIDE/ZESTORETIC) 10-12.5 MG per tablet [BERLIN Jones CNP]    Preferred pharmacy: [facility] [address] [place] [address]    Comment:

## 2018-11-01 NOTE — TELEPHONE ENCOUNTER
Prinzide:  Prescription approved per [facility] Refill Protocol.  Due for diabetic labs in December.     Tricia Bourne, RN, BSN

## 2018-12-03 ENCOUNTER — MYC MEDICAL ADVICE (OUTPATIENT)
Dept: FAMILY MEDICINE | Facility: OTHER | Age: 58
End: 2018-12-03

## 2018-12-03 ENCOUNTER — TELEPHONE (OUTPATIENT)
Dept: FAMILY MEDICINE | Facility: OTHER | Age: 58
End: 2018-12-03

## 2018-12-03 DIAGNOSIS — E11.00 TYPE 2 DIABETES MELLITUS WITH HYPEROSMOLARITY WITHOUT COMA, WITHOUT LONG-TERM CURRENT USE OF INSULIN (H): Primary | ICD-10-CM

## 2018-12-03 DIAGNOSIS — E11.00 TYPE 2 DIABETES MELLITUS WITH HYPEROSMOLARITY WITHOUT COMA, WITHOUT LONG-TERM CURRENT USE OF INSULIN (H): ICD-10-CM

## 2018-12-03 DIAGNOSIS — E11.9 NEW ONSET TYPE 2 DIABETES MELLITUS (H): ICD-10-CM

## 2018-12-03 NOTE — TELEPHONE ENCOUNTER
This patient has an appointment for lab work but does not have any orders. Please place some future orders as needed.    Thank You,  Martha Jo MLT (ASCP)

## 2018-12-03 NOTE — TELEPHONE ENCOUNTER
Reason for Call:  Other returning call    Detailed comments: Patient called clinic back to cancel appt on 12/7. She will call back with a good time to do fasting lab    Phone Number Patient can be reached at: Home number on file [phone] (home)    Best Time: ---    Can we leave a detailed message on this number? Not Applicable    Call taken on 12/3/2018 at 8:34 AM by Gera Uribe

## 2018-12-04 NOTE — TELEPHONE ENCOUNTER
Orders placed for future hgb A1C, micro-albumin, and lipid fasting  Thank you  Halie Hernandes CNP

## 2018-12-05 DIAGNOSIS — E11.00 TYPE 2 DIABETES MELLITUS WITH HYPEROSMOLARITY WITHOUT COMA, WITHOUT LONG-TERM CURRENT USE OF INSULIN (H): ICD-10-CM

## 2018-12-05 LAB
CHOLEST SERPL-MCNC: 119 MG/DL
CREAT UR-MCNC: 280 MG/DL
HDLC SERPL-MCNC: 45 MG/DL
LDLC SERPL CALC-MCNC: 49 MG/DL
MICROALBUMIN UR-MCNC: 20 MG/L
MICROALBUMIN/CREAT UR: 6.96 MG/G CR (ref 0–25)
NONHDLC SERPL-MCNC: 74 MG/DL
TRIGL SERPL-MCNC: 127 MG/DL

## 2018-12-05 PROCEDURE — 82043 UR ALBUMIN QUANTITATIVE: CPT | Performed by: NURSE PRACTITIONER

## 2018-12-05 PROCEDURE — 80061 LIPID PANEL: CPT | Performed by: NURSE PRACTITIONER

## 2018-12-05 PROCEDURE — 36415 COLL VENOUS BLD VENIPUNCTURE: CPT | Performed by: NURSE PRACTITIONER

## 2018-12-05 NOTE — PROGRESS NOTES
Please advise Shelbi Howard,  1960, that her lab results were your urine looks normal and cholesterol panel is normal as well. Keep up the good work!  [phone] (home)   Thank you  Halie Hernandes CNP

## 2018-12-05 NOTE — TELEPHONE ENCOUNTER
Metformin:  Medication is being filled for 1 time refill only due to:  Patient needs labs a1c.     Patient had lab orders placed and labs drawn this morning. The tech did not draw her A1c - I verified with the lab and they cannot add it on to what was drawn.     LM for patient to return call to come in to have A1c drawn.     Tricia Bourne, RN, BSN

## 2018-12-05 NOTE — TELEPHONE ENCOUNTER
Patient called back I gave patient information below. Patient scheduled a lab appt for A1C on 12/10/2018 at 4:30

## 2018-12-10 DIAGNOSIS — E11.00 TYPE 2 DIABETES MELLITUS WITH HYPEROSMOLARITY WITHOUT COMA, WITHOUT LONG-TERM CURRENT USE OF INSULIN (H): ICD-10-CM

## 2018-12-10 LAB — HBA1C MFR BLD: 6.6 % (ref 0–5.6)

## 2018-12-10 PROCEDURE — 36415 COLL VENOUS BLD VENIPUNCTURE: CPT | Performed by: NURSE PRACTITIONER

## 2018-12-10 PROCEDURE — 83036 HEMOGLOBIN GLYCOSYLATED A1C: CPT | Performed by: NURSE PRACTITIONER

## 2018-12-11 ENCOUNTER — TELEPHONE (OUTPATIENT)
Dept: FAMILY MEDICINE | Facility: OTHER | Age: 58
End: 2018-12-11

## 2018-12-11 NOTE — TELEPHONE ENCOUNTER
----- Message from BERLIN Ramos CNP sent at 2018  9:44 AM CST -----  Please advise PO Bill 1960, that her lab results were HGB A1C slightly up from 5 mos ago at 6.6 from 6.1 this is still below goal of 7 will continue with current medication and lifestyle continue to monitor diet as you have been.   [phone] (home)   Thank you  Halie Hernandes CNP

## 2018-12-11 NOTE — RESULT ENCOUNTER NOTE
Please advise Shelbi Howard,  1960, that her lab results were HGB A1C slightly up from 5 mos ago at 6.6 from 6.1 this is still below goal of 7 will continue with current medication and lifestyle continue to monitor diet as you have been.   [phone] (home)   Thank you  Halie Hernandes CNP

## 2018-12-16 DIAGNOSIS — E11.00 TYPE 2 DIABETES MELLITUS WITH HYPEROSMOLARITY WITHOUT COMA, WITHOUT LONG-TERM CURRENT USE OF INSULIN (H): ICD-10-CM

## 2018-12-18 RX ORDER — SIMVASTATIN 10 MG
TABLET ORAL
Qty: 90 TABLET | Refills: 3 | Status: SHIPPED | OUTPATIENT
Start: 2018-12-18 | End: 2019-12-09

## 2018-12-18 NOTE — TELEPHONE ENCOUNTER
Simvastatin    Prescription approved per [facility] Refill Protocol.    Ryanne Borrego, RN, BSN

## 2019-01-14 DIAGNOSIS — E11.9 NEW ONSET TYPE 2 DIABETES MELLITUS (H): ICD-10-CM

## 2019-01-14 DIAGNOSIS — E11.00 TYPE 2 DIABETES MELLITUS WITH HYPEROSMOLARITY WITHOUT COMA, WITHOUT LONG-TERM CURRENT USE OF INSULIN (H): ICD-10-CM

## 2019-01-15 NOTE — TELEPHONE ENCOUNTER
She is utd with labs and ov will be due in March 2019 for diabetic f/u refills sent for  Metformin 1000 mg tablet bid to [facility] pharmacy in [place].     Thank you  Halie Hernandes CNP

## 2019-01-15 NOTE — TELEPHONE ENCOUNTER
Metformin  Routing refill request to provider for review/approval because:  Elsy given x1 and patient did not follow up  Patient needs to be seen because: diabetic OV    Next 5 appointments (look out 90 days)    Mar 13, 2019  2:20 PM CDT  Return Visit with BERLIN Dang CNP  [facility] [address] [place] [id number] [phone]        Tricia Bourne, RN, BSN

## 2019-02-02 ENCOUNTER — TRANSFERRED RECORDS (OUTPATIENT)
Dept: MULTI SPECIALTY CLINIC | Facility: CLINIC | Age: 59
End: 2019-02-02

## 2019-03-05 ENCOUNTER — OFFICE VISIT (OUTPATIENT)
Dept: FAMILY MEDICINE | Facility: OTHER | Age: 59
End: 2019-03-05
Payer: COMMERCIAL

## 2019-03-05 VITALS
HEIGHT: 62 IN | WEIGHT: 181 LBS | BODY MASS INDEX: 33.31 KG/M2 | SYSTOLIC BLOOD PRESSURE: 118 MMHG | DIASTOLIC BLOOD PRESSURE: 70 MMHG | TEMPERATURE: 97.7 F | OXYGEN SATURATION: 97 % | HEART RATE: 68 BPM | RESPIRATION RATE: 16 BRPM

## 2019-03-05 DIAGNOSIS — Z13.89 SCREENING FOR DIABETIC PERIPHERAL NEUROPATHY: ICD-10-CM

## 2019-03-05 DIAGNOSIS — Z00.00 ENCOUNTER FOR PREVENTIVE HEALTH EXAMINATION: Primary | ICD-10-CM

## 2019-03-05 PROCEDURE — 99396 PREV VISIT EST AGE 40-64: CPT | Performed by: NURSE PRACTITIONER

## 2019-03-05 PROCEDURE — 99207 C FOOT EXAM  NO CHARGE: CPT | Mod: 25 | Performed by: NURSE PRACTITIONER

## 2019-03-05 ASSESSMENT — ENCOUNTER SYMPTOMS
HEMATOLOGIC/LYMPHATIC NEGATIVE: 1
MUSCULOSKELETAL NEGATIVE: 1
ALLERGIC/IMMUNOLOGIC NEGATIVE: 1
GASTROINTESTINAL NEGATIVE: 1
EYES NEGATIVE: 1
RESPIRATORY NEGATIVE: 1
PSYCHIATRIC NEGATIVE: 1
CARDIOVASCULAR NEGATIVE: 1
NEUROLOGICAL NEGATIVE: 1
ENDOCRINE NEGATIVE: 1
CONSTITUTIONAL NEGATIVE: 1

## 2019-03-05 ASSESSMENT — MIFFLIN-ST. JEOR: SCORE: 1339.26

## 2019-03-05 NOTE — PATIENT INSTRUCTIONS
Preventive Health Recommendations  Female Ages 50 - 64    Yearly exam: See your health care provider every year in order to  o Review health changes.   o Discuss preventive care.    o Review your medicines if your doctor has prescribed any.      Get a Pap test every three years (unless you have an abnormal result and your provider advises testing more often).    If you get Pap tests with HPV test, you only need to test every 5 years, unless you have an abnormal result.     You do not need a Pap test if your uterus was removed (hysterectomy) and you have not had cancer.    You should be tested each year for STDs (sexually transmitted diseases) if you're at risk.     Have a mammogram every 1 to 2 years.    Have a colonoscopy at age 50, or have a yearly FIT test (stool test). These exams screen for colon cancer.      Have a cholesterol test every 5 years, or more often if advised.    Have a diabetes test (fasting glucose) every three years. If you are at risk for diabetes, you should have this test more often.     If you are at risk for osteoporosis (brittle bone disease), think about having a bone density scan (DEXA).    Shots: Get a flu shot each year. Get a tetanus shot every 10 years.    Nutrition:     Eat at least 5 servings of fruits and vegetables each day.    Eat whole-grain bread, whole-wheat pasta and brown rice instead of white grains and rice.    Get adequate Calcium and Vitamin D.     Lifestyle    Exercise at least 150 minutes a week (30 minutes a day, 5 days a week). This will help you control your weight and prevent disease.    Limit alcohol to one drink per day.    No smoking.     Wear sunscreen to prevent skin cancer.     See your dentist every six months for an exam and cleaning.    See your eye doctor every 1 to 2 years.    Patient Education     Shingles (Herpes Zoster)     Talk to your healthcare provider about the shingles vaccine.     Shingles is also called herpes zoster. It is a painful skin  rash caused by the herpes zoster virus. This is the same virus that causes chickenpox. After a person has chickenpox, the virus remains inactive in the nerve cells. Years later, the virus can become active again and travel to the skin. Most people have shingles only once, but it is possible to have it more than once.  What are the risk factors for shingles?  Anyone who has ever had chickenpox can develop shingles. But your risk is greater if you:    Are 50 years of age or older    Have an illness that weakens your immune system, such as HIV/AIDS    Have cancer, especially Hodgkin disease or lymphoma    Take medicines that weaken your immune system  What are the symptoms of shingles?    The first sign of shingles is usually pain, burning, tingling, or itching on one part of your face or body. You may also feel as if you have the flu, with fever and chills.    A red rash with small blisters appears within a few days. The rash may appear as follows:   ? The blisters can occur anywhere, but they re most common on the back, chest, or abdomen.  ? They usually appear on only one side of the body, spreading along the nerve pathway where the virus was inactive.   ? The rash can also form around an eye, along one side of the face or neck, or in the mouth.  ? In a few people, usually those with weakened immune systems, shingles appear on more than one part of the body at once.    After a few days, the blisters become dry and form a crust. The crust falls off in days to weeks. The blisters generally do not leave scars.  How is shingles treated?  For most people, shingles heals on its own in a few weeks. But treatment is recommended to help relieve pain, speed healing, and reduce the risk of complications. Antiviral medicines are prescribed within the first 72 hours of the appearance of the rash. To lessen symptoms:    Apply ice packs (wrapped in a thin towel) or cool compresses, or soak in a cool bath.    Use calamine lotion to  calm itchy skin.    Ask your healthcare provider about over-the-counter pain relievers. If your pain is severe, your healthcare provider may prescribe stronger pain medicines.  What are the complications of shingles?  Shingles often goes away with no lasting effects. But some people have serious problems long after the blisters have healed:    Postherpetic neuralgia. This is the most common complication. It is severe nerve pain at the place where the rash used to be. It can last for months, or even years after you have had shingles. Medicines can be prescribed to help relieve the pain and improve quality of life.    Bacterial infection. Shingles blisters may become infected with bacteria. Antibiotic medicine is used to treat the infection.    Eye problems. A person with shingles on the face should see his or her healthcare provider right away. Shingles can cause serious problems with vision, and even blindness.  Very rarely shingles can also lead to pneumonia, hearing problems, brain inflammation, or even death.   When to seek medical care  Contact your healthcare provider if you experience any of the following:    Symptoms that don t go away with treatment    A rash or blisters near your eye    Increased drainage, fever, or rash after treatment, or severe pain that doesn t go away   How can shingles be prevented?  You can only get shingles if you have had chickenpox in the past. Those who have never had chickenpox can get the virus from you. Although instead of developing shingles, the person may get chickenpox. Until your blisters form scabs, avoid contact with others, especially the following:    Pregnant women who have never had chickenpox or the vaccine    Infants who were born early (prematurely) or who had low weight at birth    People with weak immune system (for example, people receiving chemotherapy for cancer, people who have had organ transplants, or people with HIV infections)     The shingles  vaccine  Shingles vaccines are available to help prevent shingles or make it less painful. Vaccination is recommended for adults 50 and older, even if you've had shingles in the past. Talk with your healthcare provider about the most appropriate time for you to get vaccinated, and which vaccine is best for you.   Date Last Reviewed: 10/1/2016    3147-8046 [employer]. [address] [place] [address], [place] [address]. All rights reserved. This information is not intended as a substitute for professional medical care. Always follow your healthcare professional's instructions.

## 2019-03-05 NOTE — PROGRESS NOTES
SUBJECTIVE:   CC: Shelbi Howard is an 59 year old woman who presents for preventive health visit.     Physical   Annual:     Getting at least 3 servings of Calcium per day:  Yes    Bi-annual eye exam:  Yes    Dental care twice a year:  Yes    Sleep apnea or symptoms of sleep apnea:  None    PHQ-2 Total Score: 0        Today's PHQ-2 Score:   PHQ-2 (  [employer]) 3/5/2019   Q1: Little interest or pleasure in doing things 0   Q2: Feeling down, depressed or hopeless 0   PHQ-2 Score 0   Q1: Little interest or pleasure in doing things Not at all   Q2: Feeling down, depressed or hopeless Not at all   PHQ-2 Score 0       Abuse: Current or Past(Physical, Sexual or Emotional)- No  Do you feel safe in your environment? Yes    Social History     Tobacco Use     Smoking status: Former Smoker     Packs/day: 0.50     Years: 38.00     Pack years: 19.00     Types: Cigarettes     Last attempt to quit: 2017     Years since quittin.0     Smokeless tobacco: Never Used   Substance Use Topics     Alcohol use: Yes     Comment: 4 x times     No flowsheet data found.    Reviewed orders with patient.  Reviewed health maintenance and updated orders accordingly - Yes  Labs reviewed in EPIC  BP Readings from Last 3 Encounters:   19 118/70   10/09/18 118/74   18 112/68    Wt Readings from Last 3 Encounters:   19 82.1 kg (181 lb)   10/09/18 82.6 kg (182 lb)   18 82.1 kg (181 lb)                  Patient Active Problem List   Diagnosis     Post-menopausal bleeding     ASCUS of cervix with negative high risk HPV     Endometrial carcinoma (H)     S/P hysterectomy     New onset type 2 diabetes mellitus (H)     Type 2 diabetes mellitus with hyperosmolarity without coma, without long-term current use of insulin (H)     Past Surgical History:   Procedure Laterality Date     CYSTOSCOPY N/A 2017    Procedure: CYSTOSCOPY;;  Surgeon: Facundo Parekh MD;  Location: [facility] OR     DAVINCI HYSTERECTOMY TOTAL, BILATERAL  SALPINGO-OOPHORECTOMY, NODE DISSECTION, COMBINED N/A 2017    Procedure: COMBINED DAVINCI HYSTERECTOMY TOTAL, SALPINGO-OOPHORECTOMY, NODE DISSECTION;  DaVinci Assisted Total Laparoscopic Hysterectomy, Right Salpingo-Oophorectomy, Lysis of Adhesions, Cystoscopy;  Surgeon: Facundo Parekh MD;  Location: [facility] OR     LAPAROSCOPIC HYSTERECTOMY TOTAL  2017     oopherectomy Left     13 lb benign mass removal       Social History     Tobacco Use     Smoking status: Former Smoker     Packs/day: 0.50     Years: 38.00     Pack years: 19.00     Types: Cigarettes     Last attempt to quit: 2017     Years since quittin.0     Smokeless tobacco: Never Used   Substance Use Topics     Alcohol use: Yes     Comment: 4 x times     Family History   Problem Relation Age of Onset     Diabetes Brother      Influenza/Pneumonia Father 62     Kidney Disease Sister          Current Outpatient Medications   Medication Sig Dispense Refill     alcohol swab prep pads Use to swab area of injection/jennifer as directed. 100 each 0     aspirin 81 MG EC tablet Take 1 tablet (81 mg) by mouth daily 90 tablet 3     blood glucose monitoring (VI CONTOUR NEXT) test strip Use to test blood sugar 2 times daily. 100 strip 3     blood glucose monitoring (VI MICROLET) lancets Use to test blood sugar 2 times daily. 100 each 3     lisinopril-hydrochlorothiazide (PRINZIDE/ZESTORETIC) 10-12.5 MG per tablet Take 1 tablet by mouth daily 90 tablet 1     metFORMIN (GLUCOPHAGE) 1000 MG tablet TAKE ONE TABLET BY MOUTH TWICE DAILY WITH MEALS 60 tablet 3     simvastatin (ZOCOR) 10 MG tablet TAKE 1 TABLET BY MOUTH AT BEDTIME 90 tablet 3     Allergies   Allergen Reactions     Latex Itching     Recent Labs   Lab Test 12/10/18  1647 18  0839 18  1725 18  1752 17  1458   A1C 6.6*  --  6.2* 6.4*  --    LDL  --  49  --   --  54   HDL  --  45*  --   --  46*   TRIG  --  127  --   --  125   CR  --   --  0.72 0.59  --    GFRESTIMATED  --    "--  82 >90  --    GFRESTBLACK  --   --  >90 >90  --    POTASSIUM  --   --  4.4 3.9  --    TSH  --   --   --  0.53 0.36*        Mammogram Screening: Patient over age 50, mutual decision to screen reflected in health maintenance.    Pertinent mammograms are reviewed under the imaging tab.  History of abnormal Pap smear: NO - age 30-65 PAP every 5 years with negative HPV co-testing recommended  PAP / HPV Latest Ref Rng & Units 10/17/2017   PAP - ASC-US(A)   HPV 16 DNA NEG:Negative Negative   HPV 18 DNA NEG:Negative Negative   OTHER HR HPV NEG:Negative Negative     Reviewed and updated as needed this visit by clinical staff  Tobacco  Allergies  Meds  Med Hx  Surg Hx  Fam Hx  Soc Hx        Reviewed and updated as needed this visit by Provider            Review of Systems   Constitutional: Negative.    HENT: Negative.    Eyes: Negative.    Respiratory: Negative.    Cardiovascular: Negative.    Gastrointestinal: Negative.    Endocrine: Negative.    Breasts:  negative.    Genitourinary: Negative.    Musculoskeletal: Negative.    Skin: Negative.    Allergic/Immunologic: Negative.    Neurological: Negative.    Hematological: Negative.    Psychiatric/Behavioral: Negative.       OBJECTIVE:   /70   Pulse 68   Temp 97.7  F (36.5  C) (Temporal)   Resp 16   Ht 1.575 m (5' 2\")   Wt 82.1 kg (181 lb)   SpO2 97%   BMI 33.11 kg/m    Physical Exam   Constitutional: She is oriented to person, place, and time. She appears well-developed and well-nourished.   HENT:   Head: Normocephalic and atraumatic.   Right Ear: External ear normal.   Left Ear: External ear normal.   Nose: Nose normal.   Mouth/Throat: Oropharynx is clear and moist.   Eyes: Conjunctivae and EOM are normal. Pupils are equal, round, and reactive to light.   Neck: Normal range of motion. Neck supple.   Cardiovascular: Normal rate, regular rhythm, normal heart sounds and intact distal pulses.   Pulmonary/Chest: Effort normal and breath sounds normal. " "  Abdominal: Soft. Bowel sounds are normal.   Musculoskeletal: Normal range of motion.   Neurological: She is alert and oriented to person, place, and time.   Skin: Skin is warm and dry. Capillary refill takes less than 2 seconds.   Psychiatric: She has a normal mood and affect. Her behavior is normal. Judgment and thought content normal.   Nursing note and vitals reviewed.    Diabetic foot exam: normal DP and PT pulses, no trophic changes or ulcerative lesions and normal sensory exam      ASSESSMENT/PLAN:   1. Encounter for preventive health examination  Reviewed recommended screenings and ordered appropriate testing for pt's risks and per pt's request(s).       2. Screening for diabetic peripheral neuropathy  completed  - FOOT EXAM  NO CHARGE [03492.430]    COUNSELING:  Reviewed preventive health counseling, as reflected in patient instructions       Regular exercise       Healthy diet/nutrition       Vision screening       Immunizations    Recommend pharmacy for vaccine for: Zoster          BP Readings from Last 1 Encounters:   03/05/19 118/70     Estimated body mass index is 33.11 kg/m  as calculated from the following:    Height as of this encounter: 1.575 m (5' 2\").    Weight as of this encounter: 82.1 kg (181 lb).      Weight management plan: Discussed healthy diet and exercise guidelines     reports that she quit smoking about 2 years ago. Her smoking use included cigarettes. She has a 19.00 pack-year smoking history. she has never used smokeless tobacco.      Counseling Resources:  ATP IV Guidelines  Pooled Cohorts Equation Calculator  Breast Cancer Risk Calculator  FRAX Risk Assessment  ICSI Preventive Guidelines  Dietary Guidelines for Americans, 2010  USDA's MyPlate  ASA Prophylaxis  Lung CA Screening    BERLIN Jones [facility]  "

## 2019-03-13 ENCOUNTER — ONCOLOGY VISIT (OUTPATIENT)
Dept: ONCOLOGY | Facility: CLINIC | Age: 59
End: 2019-03-13
Payer: COMMERCIAL

## 2019-03-13 VITALS
OXYGEN SATURATION: 97 % | BODY MASS INDEX: 33.19 KG/M2 | TEMPERATURE: 98.2 F | HEIGHT: 62 IN | WEIGHT: 180.38 LBS | HEART RATE: 60 BPM | DIASTOLIC BLOOD PRESSURE: 70 MMHG | SYSTOLIC BLOOD PRESSURE: 112 MMHG | RESPIRATION RATE: 16 BRPM

## 2019-03-13 DIAGNOSIS — C54.1 ENDOMETRIAL CARCINOMA (H): Primary | ICD-10-CM

## 2019-03-13 PROCEDURE — 99213 OFFICE O/P EST LOW 20 MIN: CPT | Performed by: NURSE PRACTITIONER

## 2019-03-13 ASSESSMENT — MIFFLIN-ST. JEOR: SCORE: 1336.56

## 2019-03-13 ASSESSMENT — PAIN SCALES - GENERAL: PAINLEVEL: NO PAIN (0)

## 2019-03-13 NOTE — NURSING NOTE
"Oncology Rooming Note    March 13, 2019 2:01 PM   Shelbi Howard is a 59 year old female who presents for:    Chief Complaint   Patient presents with     Oncology Clinic Visit     6 month follow up     Initial Vitals: /70 (BP Location: Right arm)   Pulse 60   Temp 98.2  F (36.8  C) (Oral)   Resp 16   Ht 1.575 m (5' 2.01\")   Wt 81.8 kg (180 lb 6 oz)   SpO2 97%   BMI 32.98 kg/m   Estimated body mass index is 32.98 kg/m  as calculated from the following:    Height as of this encounter: 1.575 m (5' 2.01\").    Weight as of this encounter: 81.8 kg (180 lb 6 oz). Body surface area is 1.89 meters squared.  No Pain (0) Comment: Data Unavailable   No LMP recorded. Patient is postmenopausal.  Allergies reviewed: Yes  Medications reviewed: Yes    Medications: Medication refills not needed today.  Pharmacy name entered into [facility]:    [facility] [address] [facility] [address] [facility] [address] [facility] [address] [facility]      Marilia Norman LPN            "

## 2019-03-13 NOTE — LETTER
3/13/2019         RE: Shelbi Howard  [id number] [address] [facility] [address]        Dear Colleague,    Thank you for referring your patient, Shelbi Howard, to the [facility]. Please see a copy of my visit note below.                Follow Up Notes on Referred Patient    Date: 3/13/2019      RE: Shelbi Howard  : 1960  MARISSA: 3/13/2019      Shelbi Howard is a 59 year old woman with a diagnosis of stage IA grade 1 endometrioid endometrial adenocarcinoma. She is here today for a surveillance visit.      Oncology history:  10/17/17 Presented to PCP with postmenopausal bleeding.  US notable for EMS of 1.1 cm.  Endometrial biopsy with Grade 1 endometrioid adenocarcinoma     17: DaVinci Assisted Total Laparoscopic Hysterectomy, Right Salpingo-Oophorectomy, Lysis of Adhesions, Cystoscopy  SPECIMEN(S):   Cervix, uterus, right ovary fallopian tube     FINAL DIAGNOSIS:   CERVIX, UTERUS, RIGHT OVARY AND FALLOPIAN TUBE, HYSTERECTOMY AND RIGHT   SALPINGO-OOPHORECTOMY:   - Endometrial endometrioid adenocarcinoma, FIGO grade 1, arising in endometrial polyp with atypical hyperplasia   - Myometrium with no significant histologic abnormality   - Cervix with Nabothian cysts   - Ovary and fallopian tube with no significant histologic abnormality            MLH1 Expression:             - Loss of nuclear expression         MSH2 Expression:             - Intact nuclear expression         MSH6 Expression:             - Intact nuclear expression         PMS2 Expression:             - Loss of nuclear expression     MLH1 promoter methylation positive.          Today she comes to clinic and denies any concerns for her visit. She denies any vaginal bleeding, no changes in her bowel or bladder habits, no nausea/emesis, no lower extremity edema, and no difficulties eating or sleeping. She denies any abdominal discomfort/bloating, no fevers or chills, and no chest pain or shortness of breath. Her mammogram, annual  exam, and colonoscopy are current. She is not sexually active and denies any dryness. She states her BG have been good. She is looking forward to a cruise in the [place] with her sister in April.         Review of Systems:    Systemic           no weight changes; no fever; no chills; no night sweats; no appetite changes  Skin           no rashes, or lesions  Eye           no irritation; no changes in vision  Gordon-Laryngeal           no dysphagia; no hoarseness   Pulmonary    no cough; no shortness of breath  Cardiovascular    no chest pain; no palpitations; + HTN  Gastrointestinal    no diarrhea; no constipation; no abdominal pain; no changes in bowel habits; no blood in stool  Genitourinary   no urinary frequency; no urinary urgency; no dysuria; no pain; no abnormal vaginal discharge; no abnormal vaginal bleeding  Breast    no breast discharge; no breast changes; no breast pain  Musculoskeletal    no myalgias; no arthralgias; no back pain  Psychiatric           no depressed mood; no anxiety    Hematologic              no tender lymph nodes; no noticeable swellings or lumps   Endocrine    no hot flashes; no heat/cold intolerance; + DM         Neurological   no tremor; no numbness and tingling; no headaches; no difficulty sleeping      Past Medical History:    Past Medical History:   Diagnosis Date     ASCUS of cervix with negative high risk HPV 10/17/2017     Diabetes (H)     oral meds     Endometrial cancer (H)      HTN (hypertension)      Migraine      Obesity          Past Surgical History:    Past Surgical History:   Procedure Laterality Date     CYSTOSCOPY N/A 12/5/2017    Procedure: CYSTOSCOPY;;  Surgeon: Facundo Parekh MD;  Location: [facility] OR     DAVINCI HYSTERECTOMY TOTAL, BILATERAL SALPINGO-OOPHORECTOMY, NODE DISSECTION, COMBINED N/A 12/5/2017    Procedure: COMBINED DAVINCI HYSTERECTOMY TOTAL, SALPINGO-OOPHORECTOMY, NODE DISSECTION;  DaVinci Assisted Total Laparoscopic Hysterectomy, Right  "Salpingo-Oophorectomy, Lysis of Adhesions, Cystoscopy;  Surgeon: Facundo Parekh MD;  Location: [facility] OR     LAPAROSCOPIC HYSTERECTOMY TOTAL  2017     oopherectomy Left     13 lb benign mass removal         Health Maintenance Due   Topic Date Due     ZOSTER IMMUNIZATION (1 of 2) 2008       Current Medications:     Current Outpatient Medications   Medication Sig Dispense Refill     alcohol swab prep pads Use to swab area of injection/jennifer as directed. 100 each 0     aspirin 81 MG EC tablet Take 1 tablet (81 mg) by mouth daily 90 tablet 3     blood glucose monitoring (VI CONTOUR NEXT) test strip Use to test blood sugar 2 times daily. 100 strip 3     blood glucose monitoring (VI MICROLET) lancets Use to test blood sugar 2 times daily. 100 each 3     lisinopril-hydrochlorothiazide (PRINZIDE/ZESTORETIC) 10-12.5 MG per tablet Take 1 tablet by mouth daily 90 tablet 1     metFORMIN (GLUCOPHAGE) 1000 MG tablet TAKE ONE TABLET BY MOUTH TWICE DAILY WITH MEALS 60 tablet 3     simvastatin (ZOCOR) 10 MG tablet TAKE 1 TABLET BY MOUTH AT BEDTIME 90 tablet 3         Allergies:        Allergies   Allergen Reactions     Latex Itching        Social History:     Social History     Tobacco Use     Smoking status: Former Smoker     Packs/day: 0.50     Years: 38.00     Pack years: 19.00     Types: Cigarettes     Last attempt to quit: 2017     Years since quittin.0     Smokeless tobacco: Never Used   Substance Use Topics     Alcohol use: Yes     Comment: 4 x times       History   Drug Use No         Family History:     Family History   Problem Relation Age of Onset     Diabetes Brother      Influenza/Pneumonia Father 62     Kidney Disease Sister          Physical Exam:     /70 (BP Location: Right arm)   Pulse 60   Temp 98.2  F (36.8  C) (Oral)   Resp 16   Ht 1.575 m (5' 2.01\")   Wt 81.8 kg (180 lb 6 oz)   SpO2 97%   BMI 32.98 kg/m     Body mass index is 32.98 kg/m .    General Appearance: healthy and " alert, no distress     HEENT: no thyromegaly, no palpable nodules or masses        Cardiovascular: regular rate and rhythm, no gallops, rubs or murmurs     Respiratory: lungs clear, no rales, rhonchi or wheezes, normal diaphragmatic excursion    Musculoskeletal: extremities non tender and without edema    Skin: no lesions or rashes     Neurological: normal gait, no gross defects     Psychiatric: appropriate mood and affect                               Hematological: normal cervical, supraclavicular and inguinal lymph nodes     Gastrointestinal:       abdomen soft, non-tender, non-distended, large ventral hernia remains soft    Genitourinary: External genitalia and urethral meatus appears normal.  Vagina is smooth without nodularity or masses.  Cervix surgically absent..  Bimanual exam reveal no masses, nodularity or fullness other than hernia.  Recto-vaginal exam confirms these findings.      Assessment:    Shelbi Howard is a 59 year old woman with a diagnosis of stage IA grade 1 endometrioid endometrial adenocarcinoma. She is here today for a surveillance visit.     15 minutes were spent with this patient, over 50% of that time was spent in symptom management, treatment planning and in counseling and coordination of care.      Plan:     1.)        Patient to RTC in 6 months for her next surveillance visit; if she is doing well at that visit, she can extend her surveillance to annually. Reviewed recommendations from [employer] not to perform surveillance pap smears in women diagnosed with endometrial cancer as this does not improve detection of local recurrence. Reviewed signs and symptoms for when she should contact the clinic or seek additional care. Patient to contact the clinic with any questions or concerns in the interim.     2.) Genetic risk factors were assessed and her MLH1 promoter methylation is positive.      3.) Labs and/or tests ordered include:  None.     4.) Health maintenance issues addressed today  include annual health maintenance and non-gynecologic issues with PCP.    BERLIN Conde, WHNP-BC, ANP-BC  Women's Health Nurse Practitioner  Adult Nurse Pracitioner  Division of Gynecologic Oncology          CC  Patient Care Team:  No Ref-Primary, Physician as PCP - Halie Padilla APRN CNP as Assigned PCP      Again, thank you for allowing me to participate in the care of your patient.        Sincerely,        BERLIN Dang CNP

## 2019-03-13 NOTE — PROGRESS NOTES
Follow Up Notes on Referred Patient    Date: 3/13/2019      RE: Shelbi Howard  : 1960  MARISSA: 3/13/2019      Shelbi Howard is a 59 year old woman with a diagnosis of stage IA grade 1 endometrioid endometrial adenocarcinoma. She is here today for a surveillance visit.      Oncology history:  10/17/17 Presented to PCP with postmenopausal bleeding.  US notable for EMS of 1.1 cm.  Endometrial biopsy with Grade 1 endometrioid adenocarcinoma     17: DaVinci Assisted Total Laparoscopic Hysterectomy, Right Salpingo-Oophorectomy, Lysis of Adhesions, Cystoscopy  SPECIMEN(S):   Cervix, uterus, right ovary fallopian tube     FINAL DIAGNOSIS:   CERVIX, UTERUS, RIGHT OVARY AND FALLOPIAN TUBE, HYSTERECTOMY AND RIGHT   SALPINGO-OOPHORECTOMY:   - Endometrial endometrioid adenocarcinoma, FIGO grade 1, arising in endometrial polyp with atypical hyperplasia   - Myometrium with no significant histologic abnormality   - Cervix with Nabothian cysts   - Ovary and fallopian tube with no significant histologic abnormality            MLH1 Expression:             - Loss of nuclear expression         MSH2 Expression:             - Intact nuclear expression         MSH6 Expression:             - Intact nuclear expression         PMS2 Expression:             - Loss of nuclear expression     MLH1 promoter methylation positive.          Today she comes to clinic and denies any concerns for her visit. She denies any vaginal bleeding, no changes in her bowel or bladder habits, no nausea/emesis, no lower extremity edema, and no difficulties eating or sleeping. She denies any abdominal discomfort/bloating, no fevers or chills, and no chest pain or shortness of breath. Her mammogram, annual exam, and colonoscopy are current. She is not sexually active and denies any dryness. She states her BG have been good. She is looking forward to a cruise in the [place] with her sister in April.         Review of Systems:    Systemic            no weight changes; no fever; no chills; no night sweats; no appetite changes  Skin           no rashes, or lesions  Eye           no irritation; no changes in vision  Gordon-Laryngeal           no dysphagia; no hoarseness   Pulmonary    no cough; no shortness of breath  Cardiovascular    no chest pain; no palpitations; + HTN  Gastrointestinal    no diarrhea; no constipation; no abdominal pain; no changes in bowel habits; no blood in stool  Genitourinary   no urinary frequency; no urinary urgency; no dysuria; no pain; no abnormal vaginal discharge; no abnormal vaginal bleeding  Breast    no breast discharge; no breast changes; no breast pain  Musculoskeletal    no myalgias; no arthralgias; no back pain  Psychiatric           no depressed mood; no anxiety    Hematologic              no tender lymph nodes; no noticeable swellings or lumps   Endocrine    no hot flashes; no heat/cold intolerance; + DM         Neurological   no tremor; no numbness and tingling; no headaches; no difficulty sleeping      Past Medical History:    Past Medical History:   Diagnosis Date     ASCUS of cervix with negative high risk HPV 10/17/2017     Diabetes (H)     oral meds     Endometrial cancer (H)      HTN (hypertension)      Migraine      Obesity          Past Surgical History:    Past Surgical History:   Procedure Laterality Date     CYSTOSCOPY N/A 12/5/2017    Procedure: CYSTOSCOPY;;  Surgeon: Facundo Parekh MD;  Location: [facility] OR     DAVINCI HYSTERECTOMY TOTAL, BILATERAL SALPINGO-OOPHORECTOMY, NODE DISSECTION, COMBINED N/A 12/5/2017    Procedure: COMBINED DAVINCI HYSTERECTOMY TOTAL, SALPINGO-OOPHORECTOMY, NODE DISSECTION;  DaVinci Assisted Total Laparoscopic Hysterectomy, Right Salpingo-Oophorectomy, Lysis of Adhesions, Cystoscopy;  Surgeon: Facundo Parekh MD;  Location: [facility] OR     LAPAROSCOPIC HYSTERECTOMY TOTAL  12/2017     oopherectomy Left     13 lb benign mass removal         Health Maintenance Due   Topic Date Due      "ZOSTER IMMUNIZATION (1 of 2) 2008       Current Medications:     Current Outpatient Medications   Medication Sig Dispense Refill     alcohol swab prep pads Use to swab area of injection/jennifer as directed. 100 each 0     aspirin 81 MG EC tablet Take 1 tablet (81 mg) by mouth daily 90 tablet 3     blood glucose monitoring (VI CONTOUR NEXT) test strip Use to test blood sugar 2 times daily. 100 strip 3     blood glucose monitoring (VI MICROLET) lancets Use to test blood sugar 2 times daily. 100 each 3     lisinopril-hydrochlorothiazide (PRINZIDE/ZESTORETIC) 10-12.5 MG per tablet Take 1 tablet by mouth daily 90 tablet 1     metFORMIN (GLUCOPHAGE) 1000 MG tablet TAKE ONE TABLET BY MOUTH TWICE DAILY WITH MEALS 60 tablet 3     simvastatin (ZOCOR) 10 MG tablet TAKE 1 TABLET BY MOUTH AT BEDTIME 90 tablet 3         Allergies:        Allergies   Allergen Reactions     Latex Itching        Social History:     Social History     Tobacco Use     Smoking status: Former Smoker     Packs/day: 0.50     Years: 38.00     Pack years: 19.00     Types: Cigarettes     Last attempt to quit: 2017     Years since quittin.0     Smokeless tobacco: Never Used   Substance Use Topics     Alcohol use: Yes     Comment: 4 x times       History   Drug Use No         Family History:     Family History   Problem Relation Age of Onset     Diabetes Brother      Influenza/Pneumonia Father 62     Kidney Disease Sister          Physical Exam:     /70 (BP Location: Right arm)   Pulse 60   Temp 98.2  F (36.8  C) (Oral)   Resp 16   Ht 1.575 m (5' 2.01\")   Wt 81.8 kg (180 lb 6 oz)   SpO2 97%   BMI 32.98 kg/m    Body mass index is 32.98 kg/m .    General Appearance: healthy and alert, no distress     HEENT: no thyromegaly, no palpable nodules or masses        Cardiovascular: regular rate and rhythm, no gallops, rubs or murmurs     Respiratory: lungs clear, no rales, rhonchi or wheezes, normal diaphragmatic " excursion    Musculoskeletal: extremities non tender and without edema    Skin: no lesions or rashes     Neurological: normal gait, no gross defects     Psychiatric: appropriate mood and affect                               Hematological: normal cervical, supraclavicular and inguinal lymph nodes     Gastrointestinal:       abdomen soft, non-tender, non-distended, large ventral hernia remains soft    Genitourinary: External genitalia and urethral meatus appears normal.  Vagina is smooth without nodularity or masses.  Cervix surgically absent..  Bimanual exam reveal no masses, nodularity or fullness other than hernia.  Recto-vaginal exam confirms these findings.      Assessment:    Shelbi Howard is a 59 year old woman with a diagnosis of stage IA grade 1 endometrioid endometrial adenocarcinoma. She is here today for a surveillance visit.     15 minutes were spent with this patient, over 50% of that time was spent in symptom management, treatment planning and in counseling and coordination of care.      Plan:     1.)        Patient to RTC in 6 months for her next surveillance visit; if she is doing well at that visit, she can extend her surveillance to annually. Reviewed recommendations from [facility] not to perform surveillance pap smears in women diagnosed with endometrial cancer as this does not improve detection of local recurrence. Reviewed signs and symptoms for when she should contact the clinic or seek additional care. Patient to contact the clinic with any questions or concerns in the interim.     2.) Genetic risk factors were assessed and her MLH1 promoter methylation is positive.      3.) Labs and/or tests ordered include:  None.     4.) Health maintenance issues addressed today include annual health maintenance and non-gynecologic issues with PCP.    BERLIN Conde, WHNP-BC, ANP-BC  Women's Health Nurse Practitioner  Adult Nurse Pracitioner  Division of Gynecologic Oncology          CC  Patient Care  Team:  No Ref-Primary, Physician as PCP - General  Halie Hernandes, BERLIN KLINE as Assigned PCP

## 2019-04-26 ENCOUNTER — TELEPHONE (OUTPATIENT)
Dept: FAMILY MEDICINE | Facility: OTHER | Age: 59
End: 2019-04-26

## 2019-04-26 DIAGNOSIS — I10 BENIGN ESSENTIAL HYPERTENSION: ICD-10-CM

## 2019-04-26 RX ORDER — LISINOPRIL/HYDROCHLOROTHIAZIDE 10-12.5 MG
1 TABLET ORAL DAILY
Qty: 30 TABLET | Refills: 0 | Status: SHIPPED | OUTPATIENT
Start: 2019-04-26 | End: 2019-06-18

## 2019-04-26 NOTE — TELEPHONE ENCOUNTER
Reason for Call:  Medication or medication refill:    Do you use a [facility]?  Name of the pharmacy and phone number for the current request:  [facility] [address]  Phone number [phone]    Name of the medication requested: Blood Pressure    Other request: She is going on a cruise and left her medication at home.    Can we leave a detailed message on this number? YES    Phone number patient can be reached at: Cell number on file:    Telephone Information:   Mobile [phone]       Best Time: anytime    Call taken on 4/26/2019 at 8:17 AM by Kuldip Hassan

## 2019-04-26 NOTE — TELEPHONE ENCOUNTER
Prescription approved per [facility] Refill Protocol.  Left detailed message for patient.    Norris Concepcion, RN, BSN

## 2019-05-18 DIAGNOSIS — E11.9 NEW ONSET TYPE 2 DIABETES MELLITUS (H): ICD-10-CM

## 2019-05-18 DIAGNOSIS — E11.00 TYPE 2 DIABETES MELLITUS WITH HYPEROSMOLARITY WITHOUT COMA, WITHOUT LONG-TERM CURRENT USE OF INSULIN (H): ICD-10-CM

## 2019-05-20 NOTE — TELEPHONE ENCOUNTER
Metformin    Prescription approved per [facility] Refill Protocol.    A1C due in June    Mendy DOUG Dyer RN on 5/20/2019 at 3:17 PM

## 2019-05-22 DIAGNOSIS — I10 BENIGN ESSENTIAL HYPERTENSION: ICD-10-CM

## 2019-05-23 NOTE — TELEPHONE ENCOUNTER
Refill for Lisinopril/hctz 10/12.5 mg ok please clarify pharmacy     Thank you  Halie Hernandes CNP

## 2019-05-23 NOTE — TELEPHONE ENCOUNTER
Pending Prescriptions:                       Disp   Refills    lisinopril-hydrochlorothiazide (PRINZIDE/Z*30 tab*0        Sig: TAKE 1 TABLET BY MOUTH EVERY DAY    Routing refill request to provider for review/approval because:  Refill request d/t forgetting medication at home is not listed in [facility] protocol.    Katheryn Hayes, RN, BSN

## 2019-05-24 RX ORDER — LISINOPRIL/HYDROCHLOROTHIAZIDE 10-12.5 MG
1 TABLET ORAL DAILY
Qty: 90 TABLET | Refills: 1 | Status: SHIPPED | OUTPATIENT
Start: 2019-05-24 | End: 2019-11-25

## 2019-05-24 RX ORDER — LISINOPRIL/HYDROCHLOROTHIAZIDE 10-12.5 MG
1 TABLET ORAL DAILY
Qty: 90 TABLET | Refills: 1 | OUTPATIENT
Start: 2019-05-24

## 2019-05-24 NOTE — TELEPHONE ENCOUNTER
Spoke to patient and advised her of message below. Patient verbalized understanding. No further questions.

## 2019-05-24 NOTE — TELEPHONE ENCOUNTER
[facility] she uses. She did state that she has about a month left, as she had it filled in [place].    Kiana Aponte on 5/24/2019 at 10:03 AM

## 2019-05-24 NOTE — TELEPHONE ENCOUNTER
LMTC, please clarify which pharmacy patient would like Rx for lisinopril to be sent to  Thanks  Selma Le RT (R)

## 2019-06-18 DIAGNOSIS — E11.9 NEW ONSET TYPE 2 DIABETES MELLITUS (H): ICD-10-CM

## 2019-06-18 DIAGNOSIS — E11.00 TYPE 2 DIABETES MELLITUS WITH HYPEROSMOLARITY WITHOUT COMA, WITHOUT LONG-TERM CURRENT USE OF INSULIN (H): ICD-10-CM

## 2019-06-18 NOTE — TELEPHONE ENCOUNTER
Patient called clinic back. I relayed message from below and she will call back later when she has her scheduled in front of her.

## 2019-06-18 NOTE — TELEPHONE ENCOUNTER
Refill given for one month as she is due for hgb a1c and will be due for bmp in July please let Shelbi know these orders are in epic she can do lab only for this.     Thank you  Hlaie Hernandes CNP

## 2019-06-28 DIAGNOSIS — E11.00 TYPE 2 DIABETES MELLITUS WITH HYPEROSMOLARITY WITHOUT COMA, WITHOUT LONG-TERM CURRENT USE OF INSULIN (H): ICD-10-CM

## 2019-06-28 DIAGNOSIS — E11.9 NEW ONSET TYPE 2 DIABETES MELLITUS (H): ICD-10-CM

## 2019-06-28 LAB
ANION GAP SERPL CALCULATED.3IONS-SCNC: 8 MMOL/L (ref 3–14)
BUN SERPL-MCNC: 24 MG/DL (ref 7–30)
CALCIUM SERPL-MCNC: 9.3 MG/DL (ref 8.5–10.1)
CHLORIDE SERPL-SCNC: 105 MMOL/L (ref 94–109)
CO2 SERPL-SCNC: 27 MMOL/L (ref 20–32)
CREAT SERPL-MCNC: 0.74 MG/DL (ref 0.52–1.04)
GFR SERPL CREATININE-BSD FRML MDRD: 87 ML/MIN/{1.73_M2}
GLUCOSE SERPL-MCNC: 147 MG/DL (ref 70–99)
HBA1C MFR BLD: 6.7 % (ref 0–5.6)
POTASSIUM SERPL-SCNC: 4.4 MMOL/L (ref 3.4–5.3)
SODIUM SERPL-SCNC: 140 MMOL/L (ref 133–144)

## 2019-06-28 PROCEDURE — 83036 HEMOGLOBIN GLYCOSYLATED A1C: CPT | Performed by: NURSE PRACTITIONER

## 2019-06-28 PROCEDURE — 80048 BASIC METABOLIC PNL TOTAL CA: CPT | Performed by: NURSE PRACTITIONER

## 2019-06-28 PROCEDURE — 36415 COLL VENOUS BLD VENIPUNCTURE: CPT | Performed by: NURSE PRACTITIONER

## 2019-06-28 NOTE — RESULT ENCOUNTER NOTE
Please advise Shelbi Howard,  1960, that her lab results were metabolic panel is showing normal kidney function, electrolytes are normal glucose is elevated at 147 this is up from 104, 11 mos ago. Your HGB A1C is 6.7 still at a good range no change in medication at this time. Please let me know when refill is needed looks like mid July  [phone] (home)   Thank you  Halie Hernandes CNP

## 2019-07-19 ENCOUNTER — MYC REFILL (OUTPATIENT)
Dept: FAMILY MEDICINE | Facility: OTHER | Age: 59
End: 2019-07-19

## 2019-07-19 DIAGNOSIS — E11.00 TYPE 2 DIABETES MELLITUS WITH HYPEROSMOLARITY WITHOUT COMA, WITHOUT LONG-TERM CURRENT USE OF INSULIN (H): ICD-10-CM

## 2019-07-19 DIAGNOSIS — E11.9 NEW ONSET TYPE 2 DIABETES MELLITUS (H): ICD-10-CM

## 2019-07-19 NOTE — TELEPHONE ENCOUNTER
Metformin  Prescription approved per [facility] Refill Protocol.    Tricia Bourne, RN, BSN

## 2019-08-08 ENCOUNTER — TELEPHONE (OUTPATIENT)
Dept: FAMILY MEDICINE | Facility: OTHER | Age: 59
End: 2019-08-08

## 2019-08-08 NOTE — TELEPHONE ENCOUNTER
Please abstract the following data from this visit with this patient into the appropriate field in Epic:    Eye exam with ophthalmology on this date: 02/02/2019    Suzi Trinidad      Panel Management Review      Patient has the following on her problem list:     Diabetes    ASA: Passed    Last A1C  Lab Results   Component Value Date    A1C 6.7 06/28/2019    A1C 6.6 12/10/2018    A1C 6.2 07/09/2018    A1C 6.4 03/26/2018    A1C 9.0 12/05/2017     A1C tested: Passed    Last LDL:    Lab Results   Component Value Date    CHOL 119 12/05/2018     Lab Results   Component Value Date    HDL 45 12/05/2018     Lab Results   Component Value Date    LDL 49 12/05/2018     Lab Results   Component Value Date    TRIG 127 12/05/2018     No results found for: CHOLHDLRATIO  Lab Results   Component Value Date    NHDL 74 12/05/2018       Is the patient on a Statin? YES             Is the patient on Aspirin? YES    Medications     HMG CoA Reductase Inhibitors     simvastatin (ZOCOR) 10 MG tablet       Salicylates     aspirin 81 MG EC tablet             Last three blood pressure readings:  BP Readings from Last 3 Encounters:   03/13/19 112/70   03/05/19 118/70   10/09/18 118/74            Tobacco History:     History   Smoking Status     Former Smoker     Packs/day: 0.50     Years: 38.00     Types: Cigarettes     Quit date: 2/17/2017   Smokeless Tobacco     Never Used           Composite cancer screening  Chart review shows that this patient is due/due soon for the following None  Summary:    Patient is due/failing the following:   Eye exam    Action needed:   Schedule an eye exam    Type of outreach:    none per HM/last OV patient UTD    Questions for provider review:    None                                                                                                                                    Suzi Triindad       Chart routed to Care Team .

## 2019-09-06 NOTE — PROGRESS NOTES
Follow Up Notes on Referred Patient    Date: 2019        RE: Shelbi Howard  : 1960  MARISSA: 2019      Shelbi Howard is a 59 year old woman with a diagnosis of stage IA grade 1 endometrioid endometrial adenocarcinoma. She is here today for a surveillance visit.      Oncology history:  10/17/17 Presented to PCP with postmenopausal bleeding.  US notable for EMS of 1.1 cm.  Endometrial biopsy with Grade 1 endometrioid adenocarcinoma     17: DaVinci Assisted Total Laparoscopic Hysterectomy, Right Salpingo-Oophorectomy, Lysis of Adhesions, Cystoscopy  SPECIMEN(S):   Cervix, uterus, right ovary fallopian tube     FINAL DIAGNOSIS:   CERVIX, UTERUS, RIGHT OVARY AND FALLOPIAN TUBE, HYSTERECTOMY AND RIGHT   SALPINGO-OOPHORECTOMY:   - Endometrial endometrioid adenocarcinoma, FIGO grade 1, arising in endometrial polyp with atypical hyperplasia   - Myometrium with no significant histologic abnormality   - Cervix with Nabothian cysts   - Ovary and fallopian tube with no significant histologic abnormality            MLH1 Expression:             - Loss of nuclear expression         MSH2 Expression:             - Intact nuclear expression         MSH6 Expression:             - Intact nuclear expression         PMS2 Expression:             - Loss of nuclear expression     MLH1 promoter methylation positive.           Today she comes to clinic and denies any concerns for her visit. She denies any vaginal bleeding, no changes in her bowel or bladder habits, no nausea/emesis, no lower extremity edema, and no difficulties eating or sleeping. She denies any abdominal discomfort/bloating, no fevers or chills, and no chest pain or shortness of breath. She reports she has had her abdominal hernia for about 15 years; she has not noticed any changes in it. She monitors her BP at home and reports normal readings. She sees her PCP about every 6 months and is current with her health screenings; she states she  discussed seeing her PCP for her pelvic surveillance and she is agreeable with this. She is not sexually active and denies any dryness.           Review of Systems:    Systemic           no weight changes; no fever; no chills; no night sweats; no appetite changes  Skin           no rashes, or lesions  Eye           no irritation; no changes in vision  Gordon-Laryngeal           no dysphagia; no hoarseness   Pulmonary    no cough; no shortness of breath  Cardiovascular    no chest pain; no palpitations; + HTN  Gastrointestinal    no diarrhea; no constipation; no abdominal pain; no changes in bowel habits; no blood in stool  Genitourinary   no urinary frequency; no urinary urgency; no dysuria; no pain; no abnormal vaginal discharge; no abnormal vaginal bleeding  Breast    no breast discharge; no breast changes; no breast pain  Musculoskeletal    no myalgias; no arthralgias; no back pain  Psychiatric           no depressed mood; no anxiety    Hematologic              no tender lymph nodes; no noticeable swellings or lumps   Endocrine    no hot flashes; no heat/cold intolerance         Neurological   no tremor; no numbness and tingling; no headaches; no difficulty sleeping      Past Medical History:    Past Medical History:   Diagnosis Date     ASCUS of cervix with negative high risk HPV 10/17/2017     Diabetes (H)     oral meds     Endometrial cancer (H)      HTN (hypertension)      Migraine      Obesity          Past Surgical History:    Past Surgical History:   Procedure Laterality Date     CYSTOSCOPY N/A 12/5/2017    Procedure: CYSTOSCOPY;;  Surgeon: Facundo Parekh MD;  Location: [facility] OR     DAVINCI HYSTERECTOMY TOTAL, BILATERAL SALPINGO-OOPHORECTOMY, NODE DISSECTION, COMBINED N/A 12/5/2017    Procedure: COMBINED DAVINCI HYSTERECTOMY TOTAL, SALPINGO-OOPHORECTOMY, NODE DISSECTION;  DaVinci Assisted Total Laparoscopic Hysterectomy, Right Salpingo-Oophorectomy, Lysis of Adhesions, Cystoscopy;  Surgeon: Izabella  "Facundo GILLIAM MD;  Location: [facility] OR     LAPAROSCOPIC HYSTERECTOMY TOTAL  2017     oopherectomy Left     13 lb benign mass removal         Health Maintenance Due   Topic Date Due     ZOSTER IMMUNIZATION (1 of 2) 2008     INFLUENZA VACCINE (1) 2019       Current Medications:     Current Outpatient Medications   Medication Sig Dispense Refill     alcohol swab prep pads Use to swab area of injection/jennifer as directed. 100 each 0     aspirin 81 MG EC tablet Take 1 tablet (81 mg) by mouth daily 90 tablet 3     blood glucose monitoring (VI CONTOUR NEXT) test strip Use to test blood sugar 2 times daily. 100 strip 3     blood glucose monitoring (VI MICROLET) lancets Use to test blood sugar 2 times daily. 100 each 3     lisinopril-hydrochlorothiazide (PRINZIDE/ZESTORETIC) 10-12.5 MG tablet Take 1 tablet by mouth daily 90 tablet 1     metFORMIN (GLUCOPHAGE) 1000 MG tablet TAKE 1 TABLET BY MOUTH TWICE DAILY WITH MEALS 180 tablet 1     simvastatin (ZOCOR) 10 MG tablet TAKE 1 TABLET BY MOUTH AT BEDTIME 90 tablet 3         Allergies:        Allergies   Allergen Reactions     Latex Itching        Social History:     Social History     Tobacco Use     Smoking status: Former Smoker     Packs/day: 0.50     Years: 38.00     Pack years: 19.00     Types: Cigarettes     Last attempt to quit: 2017     Years since quittin.5     Smokeless tobacco: Never Used   Substance Use Topics     Alcohol use: Yes     Comment: 4 x times       History   Drug Use No         Family History:     The patient's family history is notable for:    Family History   Problem Relation Age of Onset     Diabetes Brother      Influenza/Pneumonia Father 62     Kidney Disease Sister          Physical Exam:     /72 (BP Location: Right arm)   Pulse 56   Temp 98.1  F (36.7  C) (Oral)   Resp 18   Ht 1.575 m (5' 2.01\")   Wt 83.1 kg (183 lb 3.2 oz)   SpO2 98%   BMI 33.50 kg/m    Body mass index is 33.5 kg/m .    General Appearance: healthy " and alert, no distress     HEENT: no thyromegaly, no palpable nodules or masses        Cardiovascular: regular rate and rhythm, no gallops, rubs or murmurs     Respiratory: lungs clear, no rales, rhonchi or wheezes, normal diaphragmatic excursion    Musculoskeletal: extremities non tender and without edema    Skin: no lesions or rashes     Neurological: normal gait, no gross defects     Psychiatric: appropriate mood and affect                               Hematological: normal cervical, supraclavicular and inguinal lymph nodes     Gastrointestinal:       abdomen soft, non-tender, non-distended, no organomegaly or masses other than know large ventral hernia    Genitourinary: External genitalia and urethral meatus appears normal.  Vagina is smooth without nodularity or masses.  Cervix surgically absent.  Bimanual exam reveal no masses, nodularity or fullness other than noted above.  Recto-vaginal exam confirms these findings.      Assessment:    Shelbi Howard is a 59 year old woman with a diagnosis of stage IA grade 1 endometrioid endometrial adenocarcinoma. She is here today for a surveillance visit.      20 minutes were spent with this patient, over 50% of that time was spent in symptom management, treatment planning and in counseling and coordination of care.      Plan:     1.)        Discussed she can extend her surveillance to annually or return in 6 months for her next visit; she would like to extend to annual exams which she will have with her local provider (her annual exam is 3/2020 so she can have a pelvic exam at that time).  Reviewed recommendations from [facility] not to perform surveillance pap smears in women diagnosed with endometrial cancer as this does not improve detection of local recurrence. Reviewed signs and symptoms for when she should contact the clinic or seek additional care. Patient to contact the clinic with any questions or concerns.     2.) Genetic risk factors were assessed and her MLH1  promoter methylation is positive.       3.) Labs and/or tests ordered include:  None.      4.) Health maintenance issues addressed today include annual health maintenance and non-gynecologic issues with PCP.    BERLIN Conde, WHNP-BC, ANP-BC  Women's Health Nurse Practitioner  Adult Nurse Pracitioner  Division of Gynecologic Oncology          CC  Patient Care Team:  No Ref-Primary, Physician as PCP - Halie Padilla APRN CNP as Assigned PCP

## 2019-09-09 ENCOUNTER — ONCOLOGY VISIT (OUTPATIENT)
Dept: ONCOLOGY | Facility: CLINIC | Age: 59
End: 2019-09-09
Attending: NURSE PRACTITIONER
Payer: COMMERCIAL

## 2019-09-09 VITALS
OXYGEN SATURATION: 98 % | TEMPERATURE: 98.1 F | DIASTOLIC BLOOD PRESSURE: 72 MMHG | BODY MASS INDEX: 33.71 KG/M2 | HEIGHT: 62 IN | SYSTOLIC BLOOD PRESSURE: 125 MMHG | WEIGHT: 183.2 LBS | HEART RATE: 56 BPM | RESPIRATION RATE: 18 BRPM

## 2019-09-09 DIAGNOSIS — C54.1 ENDOMETRIAL CARCINOMA (H): Primary | ICD-10-CM

## 2019-09-09 PROCEDURE — 99213 OFFICE O/P EST LOW 20 MIN: CPT | Performed by: NURSE PRACTITIONER

## 2019-09-09 ASSESSMENT — PAIN SCALES - GENERAL: PAINLEVEL: NO PAIN (0)

## 2019-09-09 ASSESSMENT — MIFFLIN-ST. JEOR: SCORE: 1349.37

## 2019-09-09 NOTE — NURSING NOTE
"Oncology Rooming Note    September 9, 2019 1:59 PM   Shelbi Howard is a 59 year old female who presents for:    Chief Complaint   Patient presents with     Oncology Clinic Visit     6 mon f/u     Initial Vitals: /72 (BP Location: Right arm)   Pulse 56   Temp 98.1  F (36.7  C) (Oral)   Resp 18   Ht 1.575 m (5' 2.01\")   Wt 83.1 kg (183 lb 3.2 oz)   SpO2 98%   BMI 33.50 kg/m   Estimated body mass index is 33.5 kg/m  as calculated from the following:    Height as of this encounter: 1.575 m (5' 2.01\").    Weight as of this encounter: 83.1 kg (183 lb 3.2 oz). Body surface area is 1.91 meters squared.  No Pain (0) Comment: Data Unavailable   No LMP recorded. Patient is postmenopausal.  Allergies reviewed: Yes  Medications reviewed: Yes    Medications: Medication refills not needed today.  Pharmacy name entered into [facility]:    [id number] - [place], [place] - [address] [facility] [place], [place] - [address] [id number] - [place], [place] - [address] [place] [address] AV      Lindy Laguna LPN              "

## 2019-11-25 DIAGNOSIS — I10 BENIGN ESSENTIAL HYPERTENSION: ICD-10-CM

## 2019-11-26 RX ORDER — LISINOPRIL/HYDROCHLOROTHIAZIDE 10-12.5 MG
TABLET ORAL
Qty: 90 TABLET | Refills: 0 | Status: SHIPPED | OUTPATIENT
Start: 2019-11-26 | End: 2020-02-14

## 2019-11-26 NOTE — TELEPHONE ENCOUNTER
Prescription approved per [facility] Refill Protocol.  Norris Concepcion, RN, BSN

## 2019-11-29 NOTE — PROGRESS NOTES
Subjective     Shelbi Howard is a 59 year old female who presents to clinic today for the following health issues:    History of Present Illness        Diabetes:   She presents for follow up of diabetes.  She is checking home blood glucose one time daily. She checks blood glucose before meals.  Blood glucose is never over 200 and never under 70. When her blood glucose is low, the patient is asymptomatic for confusion, blurred vision, lethargy and reports not feeling dizzy, shaky, or weak.  She has no concerns regarding her diabetes at this time.  She is having burning in feet. The patient has had a diabetic eye exam in the last 12 months. Eye exam performed on 2/2019.    Diabetes Management Resources    She eats 0-1 servings of fruits and vegetables daily.She consumes 1 sweetened beverage(s) daily.  She is taking medications regularly.     Complaining of mild night time tingling in bilateral feet otherwise no concerns tolerating medications well.       Patient Active Problem List   Diagnosis     Post-menopausal bleeding     ASCUS of cervix with negative high risk HPV     Endometrial carcinoma (H)     S/P hysterectomy     New onset type 2 diabetes mellitus (H)     Type 2 diabetes mellitus with hyperosmolarity without coma, without long-term current use of insulin (H)     Past Surgical History:   Procedure Laterality Date     CYSTOSCOPY N/A 12/5/2017    Procedure: CYSTOSCOPY;;  Surgeon: Facundo Parekh MD;  Location: [facility] OR     DAVINCI HYSTERECTOMY TOTAL, BILATERAL SALPINGO-OOPHORECTOMY, NODE DISSECTION, COMBINED N/A 12/5/2017    Procedure: COMBINED DAVINCI HYSTERECTOMY TOTAL, SALPINGO-OOPHORECTOMY, NODE DISSECTION;  DaVinci Assisted Total Laparoscopic Hysterectomy, Right Salpingo-Oophorectomy, Lysis of Adhesions, Cystoscopy;  Surgeon: Facundo Parekh MD;  Location: [facility] OR     LAPAROSCOPIC HYSTERECTOMY TOTAL  12/2017     oopherectomy Left     13 lb benign mass removal       Social History     Tobacco Use      Smoking status: Former Smoker     Packs/day: 0.50     Years: 38.00     Pack years: 19.00     Types: Cigarettes     Last attempt to quit: 2017     Years since quittin.8     Smokeless tobacco: Never Used   Substance Use Topics     Alcohol use: Yes     Comment: 4 x times     Family History   Problem Relation Age of Onset     Diabetes Brother      Influenza/Pneumonia Father 62     Kidney Disease Sister          Current Outpatient Medications   Medication Sig Dispense Refill     alcohol swab prep pads Use to swab area of injection/jennifer as directed. 100 each 0     aspirin 81 MG EC tablet Take 1 tablet (81 mg) by mouth daily 90 tablet 3     blood glucose monitoring (VI CONTOUR NEXT) test strip Use to test blood sugar 2 times daily. 100 strip 3     blood glucose monitoring (VI MICROLET) lancets Use to test blood sugar 2 times daily. 100 each 3     lisinopril-hydrochlorothiazide (PRINZIDE/ZESTORETIC) 10-12.5 MG tablet TAKE 1 TABLET BY MOUTH ONCE DAILY 90 tablet 0     metFORMIN (GLUCOPHAGE) 1000 MG tablet TAKE 1 TABLET BY MOUTH TWICE DAILY WITH MEALS 180 tablet 1     simvastatin (ZOCOR) 10 MG tablet Take 1 tablet (10 mg) by mouth At Bedtime 90 tablet 3     Allergies   Allergen Reactions     Latex Itching     Recent Labs   Lab Test 19  0813 12/10/18  1647 18  0839 18  1725 18  1752 17  1458   A1C 6.7* 6.6*  --  6.2* 6.4*  --    LDL  --   --  49  --   --  54   HDL  --   --  45*  --   --  46*   TRIG  --   --  127  --   --  125   CR 0.74  --   --  0.72 0.59  --    GFRESTIMATED 87  --   --  82 >90  --    GFRESTBLACK >90  --   --  >90 >90  --    POTASSIUM 4.4  --   --  4.4 3.9  --    TSH  --   --   --   --  0.53 0.36*      BP Readings from Last 3 Encounters:   19 110/68   19 125/72   19 112/70    Wt Readings from Last 3 Encounters:   19 84.8 kg (187 lb)   19 83.1 kg (183 lb 3.2 oz)   19 81.8 kg (180 lb 6 oz)         Reviewed and updated as needed this  "visit by Provider      Review of Systems   ROS COMP: Constitutional, HEENT, cardiovascular, pulmonary, GI, , musculoskeletal, neuro, skin, endocrine and psych systems are negative, except as otherwise noted.      Objective    /68   Pulse 68   Temp 97.9  F (36.6  C) (Temporal)   Resp 16   Wt 84.8 kg (187 lb)   SpO2 96%   BMI 34.19 kg/m    Body mass index is 34.19 kg/m .  Physical Exam   GENERAL: healthy, alert and no distress  NECK: no adenopathy, no asymmetry, masses, or scars and thyroid normal to palpation  RESP: lungs clear to auscultation - no rales, rhonchi or wheezes  CV: regular rate and rhythm, normal S1 S2, no S3 or S4, no murmur, click or rub, no peripheral edema and peripheral pulses strong  MS: no gross musculoskeletal defects noted, no edema  BACK: no CVA tenderness, no paralumbar tenderness  PSYCH: mentation appears normal, affect normal/bright  LYMPH: no cervical, supraclavicular, axillary, or inguinal adenopathy      Assessment & Plan     1. Type 2 diabetes mellitus with hyperosmolarity without coma, without long-term current use of insulin (H)    Refills given due for f/u in 3-6 mos p/e and labs.   - Albumin Random Urine Quantitative with Creat Ratio  - Lipid panel reflex to direct LDL Fasting  - Hemoglobin A1c  - metFORMIN (GLUCOPHAGE) 1000 MG tablet; TAKE 1 TABLET BY MOUTH TWICE DAILY WITH MEALS  Dispense: 180 tablet; Refill: 1  - simvastatin (ZOCOR) 10 MG tablet; Take 1 tablet (10 mg) by mouth At Bedtime  Dispense: 90 tablet; Refill: 3    2. New onset type 2 diabetes mellitus (H)    - metFORMIN (GLUCOPHAGE) 1000 MG tablet; TAKE 1 TABLET BY MOUTH TWICE DAILY WITH MEALS  Dispense: 180 tablet; Refill: 1     BMI:   Estimated body mass index is 34.19 kg/m  as calculated from the following:    Height as of 9/9/19: 1.575 m (5' 2.01\").    Weight as of this encounter: 84.8 kg (187 lb).   Weight management plan: Discussed healthy diet and exercise guidelines        Patient Instructions "   Please return to clinic in around 3-6 mos for labs and physical.     Thank you  Halie Hernandes CNP          [facility]

## 2019-12-09 ENCOUNTER — OFFICE VISIT (OUTPATIENT)
Dept: FAMILY MEDICINE | Facility: OTHER | Age: 59
End: 2019-12-09
Payer: COMMERCIAL

## 2019-12-09 VITALS
OXYGEN SATURATION: 96 % | DIASTOLIC BLOOD PRESSURE: 68 MMHG | SYSTOLIC BLOOD PRESSURE: 110 MMHG | HEART RATE: 68 BPM | WEIGHT: 187 LBS | BODY MASS INDEX: 34.19 KG/M2 | RESPIRATION RATE: 16 BRPM | TEMPERATURE: 97.9 F

## 2019-12-09 DIAGNOSIS — E11.00 TYPE 2 DIABETES MELLITUS WITH HYPEROSMOLARITY WITHOUT COMA, WITHOUT LONG-TERM CURRENT USE OF INSULIN (H): Primary | ICD-10-CM

## 2019-12-09 DIAGNOSIS — E11.9 NEW ONSET TYPE 2 DIABETES MELLITUS (H): ICD-10-CM

## 2019-12-09 LAB
CHOLEST SERPL-MCNC: 133 MG/DL
CREAT UR-MCNC: 132 MG/DL
HBA1C MFR BLD: 6.6 % (ref 0–5.6)
HDLC SERPL-MCNC: 50 MG/DL
LDLC SERPL CALC-MCNC: 53 MG/DL
MICROALBUMIN UR-MCNC: 7 MG/L
MICROALBUMIN/CREAT UR: 5.35 MG/G CR (ref 0–25)
NONHDLC SERPL-MCNC: 83 MG/DL
TRIGL SERPL-MCNC: 150 MG/DL

## 2019-12-09 PROCEDURE — 99213 OFFICE O/P EST LOW 20 MIN: CPT | Performed by: NURSE PRACTITIONER

## 2019-12-09 PROCEDURE — 83036 HEMOGLOBIN GLYCOSYLATED A1C: CPT | Performed by: NURSE PRACTITIONER

## 2019-12-09 PROCEDURE — 80061 LIPID PANEL: CPT | Performed by: NURSE PRACTITIONER

## 2019-12-09 PROCEDURE — 36415 COLL VENOUS BLD VENIPUNCTURE: CPT | Performed by: NURSE PRACTITIONER

## 2019-12-09 PROCEDURE — 82043 UR ALBUMIN QUANTITATIVE: CPT | Performed by: NURSE PRACTITIONER

## 2019-12-09 RX ORDER — SIMVASTATIN 10 MG
10 TABLET ORAL AT BEDTIME
Qty: 90 TABLET | Refills: 3 | Status: SHIPPED | OUTPATIENT
Start: 2019-12-09 | End: 2020-12-30

## 2019-12-09 NOTE — PATIENT INSTRUCTIONS
Please return to clinic in around 3-6 mos for labs and physical.     Thank you  Halie Hernandes CNP

## 2019-12-09 NOTE — PROGRESS NOTES
"  SUBJECTIVE:   Shelbi Howard is a 59 year old female who presents to clinic today for the following health issues:      History of Present Illness     Diabetes:     Frequency of checking blood sugars::  1 time a day    Diabetic concerns::  None    Hypoglycemia symptoms::  None    Paraesthesia present::  No    Eye Exam in the last year::  Yes    2 2019    Diabetes Management Resources    Diet:  Regular (no restrictions)  Frequency of exercise:  None  Taking medications regularly:  Yes  Medication side effects:  Not applicable  Additional concerns today:  Yes    {additional problems for roomer to add, delete if none:608934}  Problem list and histories reviewed & adjusted, as indicated.  Additional history: {NONE - AS DOCUMENTED:475403::\"as documented\"}    {ACUTE Problem SUPERLIST - brief histories:843646}    {HIST REVIEW/ LINKS 2:731010}    {PROVIDER CHARTING PREFERENCE:876353}  " 1

## 2019-12-13 ENCOUNTER — TELEPHONE (OUTPATIENT)
Dept: FAMILY MEDICINE | Facility: OTHER | Age: 59
End: 2019-12-13

## 2019-12-13 NOTE — RESULT ENCOUNTER NOTE
Please advise Shelbi Howard,  1960, that her lab results were urine is normal kidney function looks good. Your cholesterol panel is normal as well. Your HGB A1C is 6.6 this is stable. Continue to work on diet as discussed. You are doing well.   Have a Ana Perkins.   [phone] (home)   Thank you  Halie Hernandes CNP

## 2019-12-13 NOTE — TELEPHONE ENCOUNTER
----- Message from BERLIN Ramos CNP sent at 2019  7:45 AM CST -----  Please advise PO Bill 1960, that her lab results were urine is normal kidney function looks good. Your cholesterol panel is normal as well. Your HGB A1C is 6.6 this is stable. Continue to work on diet as discussed. You are doing well.   Have a Ana Perkins.   [phone] (home)   Thank you  Halie Hernandes CNP

## 2020-02-01 ENCOUNTER — TRANSFERRED RECORDS (OUTPATIENT)
Dept: HEALTH INFORMATION MANAGEMENT | Facility: CLINIC | Age: 60
End: 2020-02-01

## 2020-02-01 LAB — RETINOPATHY: NORMAL

## 2020-02-13 DIAGNOSIS — I10 BENIGN ESSENTIAL HYPERTENSION: ICD-10-CM

## 2020-02-14 RX ORDER — LISINOPRIL/HYDROCHLOROTHIAZIDE 10-12.5 MG
TABLET ORAL
Qty: 90 TABLET | Refills: 0 | Status: SHIPPED | OUTPATIENT
Start: 2020-02-14 | End: 2020-05-28

## 2020-02-14 NOTE — TELEPHONE ENCOUNTER
Pending Prescriptions:                       Disp   Refills    lisinopril-hydrochlorothiazide (ZESTORETI*90 tab*0            Sig: TAKE 1 TABLET BY MOUTH ONCE DAILY    Prescription approved per [facility] Refill Protocol.    Ryanne Borrego, MSN, RN

## 2020-03-10 ENCOUNTER — HEALTH MAINTENANCE LETTER (OUTPATIENT)
Age: 60
End: 2020-03-10

## 2020-06-28 DIAGNOSIS — E11.00 TYPE 2 DIABETES MELLITUS WITH HYPEROSMOLARITY WITHOUT COMA, WITHOUT LONG-TERM CURRENT USE OF INSULIN (H): ICD-10-CM

## 2020-06-28 DIAGNOSIS — E11.9 NEW ONSET TYPE 2 DIABETES MELLITUS (H): ICD-10-CM

## 2020-06-28 DIAGNOSIS — I10 BENIGN ESSENTIAL HYPERTENSION: ICD-10-CM

## 2020-06-29 NOTE — TELEPHONE ENCOUNTER
Patient is due for DM. Please call and schedule virtual visit before the patient runs out of the medication. If they do not have enough to make it to their visit, send back to the RN. Indicate quantity that patient will need to make it to their appointment.    Tricia Bourne, RN, BSN

## 2020-06-30 ENCOUNTER — VIRTUAL VISIT (OUTPATIENT)
Dept: FAMILY MEDICINE | Facility: OTHER | Age: 60
End: 2020-06-30
Payer: COMMERCIAL

## 2020-06-30 DIAGNOSIS — E11.00 TYPE 2 DIABETES MELLITUS WITH HYPEROSMOLARITY WITHOUT COMA, WITHOUT LONG-TERM CURRENT USE OF INSULIN (H): ICD-10-CM

## 2020-06-30 DIAGNOSIS — E11.9 NEW ONSET TYPE 2 DIABETES MELLITUS (H): ICD-10-CM

## 2020-06-30 DIAGNOSIS — I10 BENIGN ESSENTIAL HYPERTENSION: ICD-10-CM

## 2020-06-30 PROCEDURE — 99214 OFFICE O/P EST MOD 30 MIN: CPT | Mod: 95 | Performed by: NURSE PRACTITIONER

## 2020-06-30 RX ORDER — LISINOPRIL/HYDROCHLOROTHIAZIDE 10-12.5 MG
TABLET ORAL
Qty: 30 TABLET | Refills: 0 | OUTPATIENT
Start: 2020-06-30

## 2020-06-30 RX ORDER — LISINOPRIL/HYDROCHLOROTHIAZIDE 10-12.5 MG
1 TABLET ORAL DAILY
Qty: 90 TABLET | Refills: 1 | Status: SHIPPED | OUTPATIENT
Start: 2020-06-30 | End: 2020-12-28

## 2020-06-30 NOTE — TELEPHONE ENCOUNTER
Patient had virtual visit today. Please remove pending medications and close encounter.       Suzi Buitrago, [facility]

## 2020-06-30 NOTE — TELEPHONE ENCOUNTER
Please remove pending medication and close encounter.   Patient had virtual visit today.    Suzi Buitrago CMA

## 2020-06-30 NOTE — PROGRESS NOTES
"Shelbi Howard is a 60 year old female who is being evaluated via a billable telephone visit.      The patient has been notified of following:     \"This telephone visit will be conducted via a call between you and your physician/provider. We have found that certain health care needs can be provided without the need for a physical exam.  This service lets us provide the care you need with a short phone conversation.  If a prescription is necessary we can send it directly to your pharmacy.  If lab work is needed we can place an order for that and you can then stop by our lab to have the test done at a later time.    Telephone visits are billed at different rates depending on your insurance coverage. During this emergency period, for some insurers they may be billed the same as an in-person visit.  Please reach out to your insurance provider with any questions.    If during the course of the call the physician/provider feels a telephone visit is not appropriate, you will not be charged for this service.\"    Patient has given verbal consent for Telephone visit?  Yes    What phone number would you like to be contacted at? [phone]    How would you like to obtain your AVS? Mail a copy    Subjective     Shelbi Howard is a 60 year old female who presents via phone visit today for the following health issues:    HPI  Diabetes Follow-up    How often are you checking your blood sugar? One time daily  What time of day are you checking your blood sugars (select all that apply)?  Before supper  Have you had any blood sugars above 200?  No  Have you had any blood sugars below 70?  No    What symptoms do you notice when your blood sugar is low?  NA- Never has had low.     What concerns do you have today about your diabetes? None     Do you have any of these symptoms? (Select all that apply)  Numbness in feet - Intermittent    Have you had a diabetic eye exam in the last 12 months? Yes- Date of last eye exam: 02/2020,  Location: " [facility]           Hyperlipidemia Follow-Up      Are you regularly taking any medication or supplement to lower your cholesterol?   Yes- no concerns    Are you having muscle aches or other side effects that you think could be caused by your cholesterol lowering medication?  No    Hypertension Follow-up      Do you check your blood pressure regularly outside of the clinic? One per week      Are you following a low salt diet? Yes    Are your blood pressures ever more than 140 on the top number (systolic) OR more   than 90 on the bottom number (diastolic), for example 140/90? No    BP Readings from Last 2 Encounters:   12/09/19 110/68   09/09/19 125/72     Hemoglobin A1C (%)   Date Value   12/09/2019 6.6 (H)   06/28/2019 6.7 (H)     LDL Cholesterol Calculated (mg/dL)   Date Value   12/09/2019 53   12/05/2018 49         How many servings of fruits and vegetables do you eat daily?  0-1    On average, how many sweetened beverages do you drink each day (Examples: soda, juice, sweet tea, etc.  Do NOT count diet or artificially sweetened beverages)?   0    How many days per week do you exercise enough to make your heart beat faster? 3 or less    How many minutes a day do you exercise enough to make your heart beat faster? 9 or less    How many days per week do you miss taking your medication? 0     patient states blood glucose has been running around 90. Numbness in feet intermittent cut out soda this helps.   States she has been tolerating metformin and  Lisinopril/hctz well denies s/e she is in need of refills.   Due for labs BMP and HGB A1C        Patient Active Problem List   Diagnosis     Post-menopausal bleeding     ASCUS of cervix with negative high risk HPV     Endometrial carcinoma (H)     S/P hysterectomy     New onset type 2 diabetes mellitus (H)     Type 2 diabetes mellitus with hyperosmolarity without coma, without long-term current use of insulin (H)     Past Surgical History:   Procedure Laterality  Date     CYSTOSCOPY N/A 12/5/2017    Procedure: CYSTOSCOPY;;  Surgeon: Facundo Parekh MD;  Location: [facility] OR     DAVINCI HYSTERECTOMY TOTAL, BILATERAL SALPINGO-OOPHORECTOMY, NODE DISSECTION, COMBINED N/A 12/5/2017    Procedure: COMBINED DAVINCI HYSTERECTOMY TOTAL, SALPINGO-OOPHORECTOMY, NODE DISSECTION;  DaVinci Assisted Total Laparoscopic Hysterectomy, Right Salpingo-Oophorectomy, Lysis of Adhesions, Cystoscopy;  Surgeon: Facundo Parekh MD;  Location: [facility] OR     LAPAROSCOPIC HYSTERECTOMY TOTAL  12/2017     oopherectomy Left     13 lb benign mass removal       Social History     Tobacco Use     Smoking status: Former Smoker     Packs/day: 0.50     Years: 38.00     Pack years: 19.00     Types: Cigarettes     Last attempt to quit: 2/17/2017     Years since quitting: 3.3     Smokeless tobacco: Never Used   Substance Use Topics     Alcohol use: Yes     Comment: 4 x times     Family History   Problem Relation Age of Onset     Diabetes Brother      Influenza/Pneumonia Father 62     Kidney Disease Sister          Current Outpatient Medications   Medication Sig Dispense Refill     alcohol swab prep pads Use to swab area of injection/jennifer as directed. 100 each 0     aspirin 81 MG EC tablet Take 1 tablet (81 mg) by mouth daily 90 tablet 3     blood glucose monitoring (VI CONTOUR NEXT) test strip Use to test blood sugar 2 times daily. 100 strip 3     blood glucose monitoring (VI MICROLET) lancets Use to test blood sugar 2 times daily. 100 each 3     lisinopril-hydrochlorothiazide (ZESTORETIC) 10-12.5 MG tablet Take 1 tablet by mouth daily 90 tablet 1     metFORMIN (GLUCOPHAGE) 1000 MG tablet TAKE 1 TABLET BY MOUTH TWICE DAILY WITH MEALS 180 tablet 1     simvastatin (ZOCOR) 10 MG tablet Take 1 tablet (10 mg) by mouth At Bedtime 90 tablet 3     Allergies   Allergen Reactions     Latex Itching     Recent Labs   Lab Test 12/09/19  0740 06/28/19  0813 12/10/18  1647 12/05/18  0839 07/09/18  1725 03/26/18  1752  12/08/17  1458   A1C 6.6* 6.7* 6.6*  --  6.2* 6.4*  --    LDL 53  --   --  49  --   --  54   HDL 50  --   --  45*  --   --  46*   TRIG 150*  --   --  127  --   --  125   CR  --  0.74  --   --  0.72 0.59  --    GFRESTIMATED  --  87  --   --  82 >90  --    GFRESTBLACK  --  >90  --   --  >90 >90  --    POTASSIUM  --  4.4  --   --  4.4 3.9  --    TSH  --   --   --   --   --  0.53 0.36*      BP Readings from Last 3 Encounters:   12/09/19 110/68   09/09/19 125/72   03/13/19 112/70    Wt Readings from Last 3 Encounters:   12/09/19 84.8 kg (187 lb)   09/09/19 83.1 kg (183 lb 3.2 oz)   03/13/19 81.8 kg (180 lb 6 oz)                    Reviewed and updated as needed this visit by Provider         Review of Systems   Constitutional, HEENT, cardiovascular, pulmonary, GI, , musculoskeletal, neuro, skin, endocrine and psych systems are negative, except as otherwise noted.       Objective   Reported vitals:  There were no vitals taken for this visit.   healthy, alert and no distress  PSYCH: Alert and oriented times 3; coherent speech, normal   rate and volume, able to articulate logical thoughts, able   to abstract reason, no tangential thoughts, no hallucinations   or delusions  Her affect is normal  RESP: No cough, no audible wheezing, able to talk in full sentences  Remainder of exam unable to be completed due to telephone visits    Diagnostic Test Results:  Labs reviewed in Epic        Assessment/Plan:  1. Type 2 diabetes mellitus with hyperosmolarity without coma, without long-term current use of insulin (H)  Due for lab will come to clinic for this refills given no change she is doing well with lifestyle tolerating medication denies s/e   - **A1C FUTURE 6mo; Future  - metFORMIN (GLUCOPHAGE) 1000 MG tablet; TAKE 1 TABLET BY MOUTH TWICE DAILY WITH MEALS  Dispense: 180 tablet; Refill: 1    3. Benign essential hypertension  Due for lab will come to clinic for this, refill given no change.   - **Basic metabolic panel FUTURE  6mo; Future  - lisinopril-hydrochlorothiazide (ZESTORETIC) 10-12.5 MG tablet; Take 1 tablet by mouth daily  Dispense: 90 tablet; Refill: 1        Phone call duration:  10 minutes    BERLIN Jones CNP

## 2020-06-30 NOTE — PATIENT INSTRUCTIONS
Please come to clinic lab for metabolic panel and HGB A1C hypertension and diabetes management as discussed.   You will be due for Mammogram in Sept.   And follow up in Dec.     Thank you  Halie Hernandes CNP

## 2020-06-30 NOTE — TELEPHONE ENCOUNTER
Left message for patient to call clinic. Please assist with scheduling a virtual visit .    Suzi Buitrago CMA

## 2020-06-30 NOTE — TELEPHONE ENCOUNTER
Pending Prescriptions:                       Disp   Refills    metFORMIN (GLUCOPHAGE) 1000 MG tablet [Pha*180 ta*0        Sig: TAKE 1 TABLET BY MOUTH TWICE DAILY WITH MEALS      Support Staff:   Please call patient to schedule a virtual visit. (Video, Phone or E-visit)  Diabetic check  Then ask if patient will need a refill of the above medication before the visit.   Please reflag for RN and route to pool to give a 30 or 90 day diane to get them to their visit or to remove the medication and to close the encounter.    Thank you!    Norris Concepcion, RN, BSN

## 2020-07-01 ENCOUNTER — TELEPHONE (OUTPATIENT)
Dept: FAMILY MEDICINE | Facility: OTHER | Age: 60
End: 2020-07-01

## 2020-07-01 NOTE — TELEPHONE ENCOUNTER
Received fax from pharmacy stating the due to recall of medication ( metformin ) a new RX is needed.     Tay Goldman,

## 2020-07-01 NOTE — TELEPHONE ENCOUNTER
Spoke with pharmacist order is ok for metformin no need for further response   Closing encounter.  Halie Hernandes CNP

## 2020-09-10 DIAGNOSIS — I10 BENIGN ESSENTIAL HYPERTENSION: ICD-10-CM

## 2020-09-10 DIAGNOSIS — E11.00 TYPE 2 DIABETES MELLITUS WITH HYPEROSMOLARITY WITHOUT COMA, WITHOUT LONG-TERM CURRENT USE OF INSULIN (H): ICD-10-CM

## 2020-09-10 LAB
ANION GAP SERPL CALCULATED.3IONS-SCNC: 5 MMOL/L (ref 3–14)
BUN SERPL-MCNC: 38 MG/DL (ref 7–30)
CALCIUM SERPL-MCNC: 9.4 MG/DL (ref 8.5–10.1)
CHLORIDE SERPL-SCNC: 106 MMOL/L (ref 94–109)
CO2 SERPL-SCNC: 28 MMOL/L (ref 20–32)
CREAT SERPL-MCNC: 0.91 MG/DL (ref 0.52–1.04)
GFR SERPL CREATININE-BSD FRML MDRD: 67 ML/MIN/{1.73_M2}
GLUCOSE SERPL-MCNC: 167 MG/DL (ref 70–99)
HBA1C MFR BLD: 6.5 % (ref 0–5.6)
POTASSIUM SERPL-SCNC: 4.2 MMOL/L (ref 3.4–5.3)
SODIUM SERPL-SCNC: 139 MMOL/L (ref 133–144)

## 2020-09-10 PROCEDURE — 83036 HEMOGLOBIN GLYCOSYLATED A1C: CPT | Performed by: NURSE PRACTITIONER

## 2020-09-10 PROCEDURE — 36415 COLL VENOUS BLD VENIPUNCTURE: CPT | Performed by: NURSE PRACTITIONER

## 2020-09-10 PROCEDURE — 80048 BASIC METABOLIC PNL TOTAL CA: CPT | Performed by: NURSE PRACTITIONER

## 2020-11-18 ENCOUNTER — ANCILLARY PROCEDURE (OUTPATIENT)
Dept: MAMMOGRAPHY | Facility: OTHER | Age: 60
End: 2020-11-18
Payer: COMMERCIAL

## 2020-11-18 DIAGNOSIS — Z12.31 VISIT FOR SCREENING MAMMOGRAM: ICD-10-CM

## 2020-11-18 PROCEDURE — 77067 SCR MAMMO BI INCL CAD: CPT | Performed by: RADIOLOGY

## 2020-11-23 ENCOUNTER — TELEPHONE (OUTPATIENT)
Dept: FAMILY MEDICINE | Facility: OTHER | Age: 60
End: 2020-11-23

## 2020-11-23 DIAGNOSIS — E11.00 TYPE 2 DIABETES MELLITUS WITH HYPEROSMOLARITY WITHOUT COMA, WITHOUT LONG-TERM CURRENT USE OF INSULIN (H): Primary | ICD-10-CM

## 2020-11-23 NOTE — TELEPHONE ENCOUNTER
Reason for Call: Request for an order or referral:    Order or referral being requested:  Patient states she received a message from my chart that she is due for lipids and microalbumin.  There are no orders for this.  Thank you    Date needed:  Her lab appt is 12-09-20    Has the patient been seen by the PCP for this problem? YES    Additional comments: none    Phone number Patient can be reached at:  Cell number on file:    Telephone Information:   Mobile [phone]       Best Time:  any    Can we leave a detailed message on this number?  YES    Call taken on 11/23/2020 at 10:37 AM by Maricruz Brown

## 2020-12-09 DIAGNOSIS — E11.00 TYPE 2 DIABETES MELLITUS WITH HYPEROSMOLARITY WITHOUT COMA, WITHOUT LONG-TERM CURRENT USE OF INSULIN (H): ICD-10-CM

## 2020-12-09 LAB
CHOLEST SERPL-MCNC: 123 MG/DL
CREAT UR-MCNC: 271 MG/DL
HDLC SERPL-MCNC: 46 MG/DL
LDLC SERPL CALC-MCNC: 49 MG/DL
MICROALBUMIN UR-MCNC: 18 MG/L
MICROALBUMIN/CREAT UR: 6.46 MG/G CR (ref 0–25)
NONHDLC SERPL-MCNC: 77 MG/DL
TRIGL SERPL-MCNC: 141 MG/DL

## 2020-12-09 PROCEDURE — 82043 UR ALBUMIN QUANTITATIVE: CPT | Performed by: NURSE PRACTITIONER

## 2020-12-09 PROCEDURE — 36415 COLL VENOUS BLD VENIPUNCTURE: CPT | Performed by: NURSE PRACTITIONER

## 2020-12-09 PROCEDURE — 80061 LIPID PANEL: CPT | Performed by: NURSE PRACTITIONER

## 2020-12-09 NOTE — RESULT ENCOUNTER NOTE
Please advise Shelbi Howard,  1960, that her lab results were Albumin urine looks normal, cholesterol panel is normal except mild decrease in her HDL we would like to see this closer to 50 and she is at 46.  No changes in treatment at this time.  [phone] (home)   Thank you  Halie Hernandes CNP

## 2020-12-27 ENCOUNTER — HEALTH MAINTENANCE LETTER (OUTPATIENT)
Age: 60
End: 2020-12-27

## 2020-12-27 DIAGNOSIS — I10 BENIGN ESSENTIAL HYPERTENSION: ICD-10-CM

## 2020-12-27 DIAGNOSIS — E11.00 TYPE 2 DIABETES MELLITUS WITH HYPEROSMOLARITY WITHOUT COMA, WITHOUT LONG-TERM CURRENT USE OF INSULIN (H): ICD-10-CM

## 2020-12-28 RX ORDER — LISINOPRIL/HYDROCHLOROTHIAZIDE 10-12.5 MG
TABLET ORAL
Qty: 90 TABLET | Refills: 0 | Status: SHIPPED | OUTPATIENT
Start: 2020-12-28 | End: 2021-03-30

## 2020-12-28 NOTE — TELEPHONE ENCOUNTER
Medication is being filled for 1 time refill only due to:  Patient needs to be seen because follow up diabetes.    Please call patient to schedule an appointment.    Selma Macias RN on 12/28/2020 at 4:28 PM

## 2020-12-29 DIAGNOSIS — E11.00 TYPE 2 DIABETES MELLITUS WITH HYPEROSMOLARITY WITHOUT COMA, WITHOUT LONG-TERM CURRENT USE OF INSULIN (H): ICD-10-CM

## 2020-12-30 RX ORDER — SIMVASTATIN 10 MG
10 TABLET ORAL AT BEDTIME
Qty: 90 TABLET | Refills: 3 | Status: SHIPPED | OUTPATIENT
Start: 2020-12-30 | End: 2021-12-29

## 2020-12-30 NOTE — TELEPHONE ENCOUNTER
Prescription approved per [facility] Refill Protocol.  Ezekiel Solorzano RN  December 30, 2020

## 2020-12-31 NOTE — PROGRESS NOTES
Subjective     Shelbi Howard is a 60 year old female who presents to clinic today for the following health issues:    History of Present Illness       Diabetes:   She presents for follow up of diabetes.  She is checking home blood glucose one time daily. She checks blood glucose before meals.  Blood glucose is never over 200 and never under 70. When her blood glucose is low, the patient is asymptomatic for confusion, blurred vision, lethargy and reports not feeling dizzy, shaky, or weak.  She has no concerns regarding her diabetes at this time.  She is having burning in feet.         She eats 2-3 servings of fruits and vegetables daily.She consumes 1 sweetened beverage(s) daily.She exercises with enough effort to increase her heart rate 9 or less minutes per day.  She exercises with enough effort to increase her heart rate 3 or less days per week.   She is taking medications regularly.                 Review of Systems   Constitutional, HEENT, cardiovascular, pulmonary, GI, , musculoskeletal, neuro, skin, endocrine and psych systems are negative, except as otherwise noted.      Objective    There were no vitals taken for this visit.  There is no height or weight on file to calculate BMI.  Physical Exam   GENERAL: healthy, alert and no distress  EYES: Eyes grossly normal to inspection, PERRL and conjunctivae and sclerae normal  HENT: ear canals and TM's normal, nose and mouth without ulcers or lesions  NECK: no adenopathy, no asymmetry, masses, or scars and thyroid normal to palpation  RESP: lungs clear to auscultation - no rales, rhonchi or wheezes  CV: regular rate and rhythm, normal S1 S2, no S3 or S4, no murmur, click or rub, no peripheral edema and peripheral pulses strong  ABDOMEN: soft, nontender, no hepatosplenomegaly, no masses and bowel sounds normal  MS: no gross musculoskeletal defects noted, no edema  SKIN: no suspicious lesions or rashes  NEURO: Normal strength and tone, mentation intact and speech  normal  PSYCH: mentation appears normal, affect normal/bright  Diabetic foot exam: normal DP and PT pulses, no trophic changes or ulcerative lesions and normal sensory exam         Assessment & Plan     Type 2 diabetes mellitus with hyperosmolarity without coma, without long-term current use of insulin (H)    - FOOT EXAM    Need for vaccination    - SHINGRIX [[id number]]        Patient Instructions       Patient Education     Shingles (Herpes Zoster)     Talk to your healthcare provider about the shingles vaccine.   Shingles is also called herpes zoster. It's a painful skin rash caused by the herpes zoster virus. This is the same virus that causes chickenpox. After a person has chickenpox, the virus stays inactive in the nerve cells. Years later, the virus can become active again and travel along the nerve to the skin. Most people have shingles only once. But it's possible to have it more than once.  Who is at risk for shingles?  Anyone who has ever had chickenpox can get shingles. But your risk is greater if you:    Are age 50 or older    Have an illness that weakens your immune system, such as HIV/AIDS    Have cancer, especially Hodgkin disease or lymphoma    Take medicines that weaken your immune system  What are the symptoms of shingles?    The first sign of shingles is often pain, burning, tingling, or itching on one part of your face or body. You may also feel as if you have the flu, with fever and chills.    A red rash with small blisters appears in a few days. The rash may look as follows:   ? The blisters can occur anywhere, but they re most common on the back, chest, or belly (abdomen).  ? They usually appear on only one side of the body, spreading along the nerve pathway where the virus is reactivating.   ? The rash can also form around an eye, along one side of the face or neck, or in the mouth.  ? In a few people, often those with a weak immune system, shingles appear on more than one part of the body at  once.    After a few days, the blisters become dry and form a crust. The crust falls off in days to weeks. The blisters generally don't leave scars. But they can in severe cases. Or if someone has a weak immune system.    How is shingles treated?  For most people, shingles heals on its own in a few days or weeks. But treatment is advised to help ease pain, speed healing, and reduce the risk of complications. Antiviral medicines are most often only prescribed if you are seen by a healthcare provider within the first 72 hours of having the rash. But antiviral medicines may be prescribed even after 72 hours if someone's immune system is weak. Or if the infection is extensive, severe, or isn't going away. To reduce symptoms:    Apply ice packs or cool compresses, or soak in a cool bath. To make an ice pack, put ice cubes in a plastic bag that seals at the top. Wrap the bag in a clean, thin towel or cloth. Never put ice or an ice pack directly on the skin.    Use calamine lotion to calm itchy skin.    Ask your provider about over-the-counter pain relievers. If your pain is severe, your provider may prescribe stronger pain medicines.  What are possible complications of shingles?  Shingles often goes away with no lasting effects. But some people have complications during or after the infection comes out:    Postherpetic neuralgia. This is the most common complication. It's more likely as people age, especially after age 60. It' is nerve pain at the place where the rash used to be. It can range from mild to severe. It can last for only a few days, or for months or even years after you have had shingles. Antiviral medicines given during the first 72 hours of the rash can reduce the chance of postherpetic neuralgia. Other medicines can be prescribed to help ease the pain and improve quality of life.    Bacterial infection. Shingles blisters may get infected with bacteria. Depending on the severity of the infection, topical,  oral or IV (intravenous) antibiotic medicine is used to treat the infection.    Eye problems. If you have shingles on the face, see your healthcare provider right away. Shingles can cause serious problems with vision, and even blindness.  In very rare cases, shingles can also lead to pneumonia, hearing problems, brain inflammation, or even death.   When to get medical care  Call your healthcare provider if you have any of these:    New symptoms or symptoms that don t go away with treatment    A rash or blisters near your eye    More drainage, fever, or rash after treatment    Severe pain that doesn t go away  How can shingles be prevented?  You can only get shingles if you have had chickenpox in the past. Someone who never had chickenpox can get the virus from you. But instead of have shingles, the person may get chickenpox. Until your blisters form scabs, don't have any contact with others, especially the following:    Pregnant women who have never had chickenpox or the vaccine    Babies who were born early (premature) or who had low weight at birth    People with weak immune systems (such as people getting chemotherapy for cancer, people who have had organ transplants, or people with HIV infections), particularly if they have never had chicken pox.  The shingles vaccine  Two shingles vaccines are available to help prevent shingles or make it less painful:    Zoster vaccine live (ZVL)    Recombinant zoster vaccine (RZV). This is a newer vaccine that has been available since 2017.  You should get the shingles vaccine if you are healthy and age 50 or older, even if you've had shingles in the past. Two shots of the RZV vaccine are recommended. You should get the second RZV shot 2 to 6 months after the first. The vaccine makes it less likely that you will develop shingles. If you do develop shingles, your symptoms will likely be milder than if you hadn t been vaccinated. RZV is also advised even if you had the older  shingles vaccine in the past. That's because the RZV vaccine works better and protects you from shingles longer.  Talk with your healthcare provider about the best time to get vaccinated. Ask which vaccine is best for you based on your age and health conditions.  [employer] last reviewed this educational content on 6/1/2019 2000-2020 [employer]. [address] [place] [address], [place] [address]. All rights reserved. This information is not intended as a substitute for professional medical care. Always follow your healthcare professional's instructions.               No follow-ups on file.    BERLIN Jones [facility]      Check on pap need

## 2021-01-06 ENCOUNTER — OFFICE VISIT (OUTPATIENT)
Dept: FAMILY MEDICINE | Facility: CLINIC | Age: 61
End: 2021-01-06
Payer: COMMERCIAL

## 2021-01-06 VITALS
DIASTOLIC BLOOD PRESSURE: 64 MMHG | RESPIRATION RATE: 12 BRPM | SYSTOLIC BLOOD PRESSURE: 126 MMHG | WEIGHT: 179.5 LBS | HEART RATE: 64 BPM | TEMPERATURE: 97.2 F | BODY MASS INDEX: 32.82 KG/M2

## 2021-01-06 DIAGNOSIS — Z23 NEED FOR VACCINATION: ICD-10-CM

## 2021-01-06 DIAGNOSIS — E11.00 TYPE 2 DIABETES MELLITUS WITH HYPEROSMOLARITY WITHOUT COMA, WITHOUT LONG-TERM CURRENT USE OF INSULIN (H): Primary | ICD-10-CM

## 2021-01-06 PROCEDURE — 90471 IMMUNIZATION ADMIN: CPT | Performed by: NURSE PRACTITIONER

## 2021-01-06 PROCEDURE — 99207 PR FOOT EXAM NO CHARGE: CPT | Performed by: NURSE PRACTITIONER

## 2021-01-06 PROCEDURE — 99213 OFFICE O/P EST LOW 20 MIN: CPT | Mod: 25 | Performed by: NURSE PRACTITIONER

## 2021-01-06 PROCEDURE — 90750 HZV VACC RECOMBINANT IM: CPT | Performed by: NURSE PRACTITIONER

## 2021-01-06 NOTE — PATIENT INSTRUCTIONS
Patient Education     Shingles (Herpes Zoster)     Talk to your healthcare provider about the shingles vaccine.   Shingles is also called herpes zoster. It's a painful skin rash caused by the herpes zoster virus. This is the same virus that causes chickenpox. After a person has chickenpox, the virus stays inactive in the nerve cells. Years later, the virus can become active again and travel along the nerve to the skin. Most people have shingles only once. But it's possible to have it more than once.  Who is at risk for shingles?  Anyone who has ever had chickenpox can get shingles. But your risk is greater if you:    Are age 50 or older    Have an illness that weakens your immune system, such as HIV/AIDS    Have cancer, especially Hodgkin disease or lymphoma    Take medicines that weaken your immune system  What are the symptoms of shingles?    The first sign of shingles is often pain, burning, tingling, or itching on one part of your face or body. You may also feel as if you have the flu, with fever and chills.    A red rash with small blisters appears in a few days. The rash may look as follows:   ? The blisters can occur anywhere, but they re most common on the back, chest, or belly (abdomen).  ? They usually appear on only one side of the body, spreading along the nerve pathway where the virus is reactivating.   ? The rash can also form around an eye, along one side of the face or neck, or in the mouth.  ? In a few people, often those with a weak immune system, shingles appear on more than one part of the body at once.    After a few days, the blisters become dry and form a crust. The crust falls off in days to weeks. The blisters generally don't leave scars. But they can in severe cases. Or if someone has a weak immune system.    How is shingles treated?  For most people, shingles heals on its own in a few days or weeks. But treatment is advised to help ease pain, speed healing, and reduce the risk of  complications. Antiviral medicines are most often only prescribed if you are seen by a healthcare provider within the first 72 hours of having the rash. But antiviral medicines may be prescribed even after 72 hours if someone's immune system is weak. Or if the infection is extensive, severe, or isn't going away. To reduce symptoms:    Apply ice packs or cool compresses, or soak in a cool bath. To make an ice pack, put ice cubes in a plastic bag that seals at the top. Wrap the bag in a clean, thin towel or cloth. Never put ice or an ice pack directly on the skin.    Use calamine lotion to calm itchy skin.    Ask your provider about over-the-counter pain relievers. If your pain is severe, your provider may prescribe stronger pain medicines.  What are possible complications of shingles?  Shingles often goes away with no lasting effects. But some people have complications during or after the infection comes out:    Postherpetic neuralgia. This is the most common complication. It's more likely as people age, especially after age 60. It' is nerve pain at the place where the rash used to be. It can range from mild to severe. It can last for only a few days, or for months or even years after you have had shingles. Antiviral medicines given during the first 72 hours of the rash can reduce the chance of postherpetic neuralgia. Other medicines can be prescribed to help ease the pain and improve quality of life.    Bacterial infection. Shingles blisters may get infected with bacteria. Depending on the severity of the infection, topical, oral or IV (intravenous) antibiotic medicine is used to treat the infection.    Eye problems. If you have shingles on the face, see your healthcare provider right away. Shingles can cause serious problems with vision, and even blindness.  In very rare cases, shingles can also lead to pneumonia, hearing problems, brain inflammation, or even death.   When to get medical care  Call your healthcare  provider if you have any of these:    New symptoms or symptoms that don t go away with treatment    A rash or blisters near your eye    More drainage, fever, or rash after treatment    Severe pain that doesn t go away  How can shingles be prevented?  You can only get shingles if you have had chickenpox in the past. Someone who never had chickenpox can get the virus from you. But instead of have shingles, the person may get chickenpox. Until your blisters form scabs, don't have any contact with others, especially the following:    Pregnant women who have never had chickenpox or the vaccine    Babies who were born early (premature) or who had low weight at birth    People with weak immune systems (such as people getting chemotherapy for cancer, people who have had organ transplants, or people with HIV infections), particularly if they have never had chicken pox.  The shingles vaccine  Two shingles vaccines are available to help prevent shingles or make it less painful:    Zoster vaccine live (ZVL)    Recombinant zoster vaccine (RZV). This is a newer vaccine that has been available since 2017.  You should get the shingles vaccine if you are healthy and age 50 or older, even if you've had shingles in the past. Two shots of the RZV vaccine are recommended. You should get the second RZV shot 2 to 6 months after the first. The vaccine makes it less likely that you will develop shingles. If you do develop shingles, your symptoms will likely be milder than if you hadn t been vaccinated. RZV is also advised even if you had the older shingles vaccine in the past. That's because the RZV vaccine works better and protects you from shingles longer.  Talk with your healthcare provider about the best time to get vaccinated. Ask which vaccine is best for you based on your age and health conditions.  [employer] last reviewed this educational content on 6/1/2019 2000-2020 [employer]. [address], [place]  [address]. All rights reserved. This information is not intended as a substitute for professional medical care. Always follow your healthcare professional's instructions.

## 2021-02-02 ENCOUNTER — MYC MEDICAL ADVICE (OUTPATIENT)
Dept: FAMILY MEDICINE | Facility: CLINIC | Age: 61
End: 2021-02-02

## 2021-03-29 ENCOUNTER — MYC MEDICAL ADVICE (OUTPATIENT)
Dept: FAMILY MEDICINE | Facility: CLINIC | Age: 61
End: 2021-03-29

## 2021-03-30 DIAGNOSIS — I10 BENIGN ESSENTIAL HYPERTENSION: ICD-10-CM

## 2021-03-30 DIAGNOSIS — E11.00 TYPE 2 DIABETES MELLITUS WITH HYPEROSMOLARITY WITHOUT COMA, WITHOUT LONG-TERM CURRENT USE OF INSULIN (H): ICD-10-CM

## 2021-03-30 RX ORDER — LISINOPRIL/HYDROCHLOROTHIAZIDE 10-12.5 MG
TABLET ORAL
Qty: 90 TABLET | Refills: 1 | Status: SHIPPED | OUTPATIENT
Start: 2021-03-30 | End: 2021-09-29

## 2021-03-30 NOTE — TELEPHONE ENCOUNTER
Pending Prescriptions:                       Disp   Refills    metFORMIN (GLUCOPHAGE) 1000 MG tablet [Pha*180 ta*0        Sig: TAKE 1 TABLET BY MOUTH TWICE DAILY WITH MEALS .           APPOINTMENT REQUIRED FOR FUTURE REFILLS    Signed Prescriptions:                        Disp   Refills    lisinopril-hydrochlorothiazide (ZESTORETIC*90 tab*1        Sig: Take 1 tablet by mouth once daily  Authorizing Provider: MARK ROLLINS  Ordering User: BREE MARTINEZ      Routing refill request to provider for review/approval because:  Labs not current:  a1c    Bree Martinez RN on 3/30/2021 at 2:01 PM

## 2021-03-30 NOTE — TELEPHONE ENCOUNTER
Prescription approved per [facility] Refill Protocol. Lisinopril.  Selma Macias RN on 3/30/2021 at 2:01 PM

## 2021-04-24 ENCOUNTER — HEALTH MAINTENANCE LETTER (OUTPATIENT)
Age: 61
End: 2021-04-24

## 2021-04-28 ENCOUNTER — ALLIED HEALTH/NURSE VISIT (OUTPATIENT)
Dept: FAMILY MEDICINE | Facility: CLINIC | Age: 61
End: 2021-04-28
Payer: COMMERCIAL

## 2021-04-28 DIAGNOSIS — Z23 NEED FOR VACCINATION: Primary | ICD-10-CM

## 2021-04-28 PROCEDURE — 90750 HZV VACC RECOMBINANT IM: CPT

## 2021-04-28 PROCEDURE — 90471 IMMUNIZATION ADMIN: CPT

## 2021-04-28 PROCEDURE — 99207 PR NO CHARGE NURSE ONLY: CPT

## 2021-04-28 NOTE — PROGRESS NOTES
Prior to immunization administration, verified patients identity using patient s name and date of birth. Please see Immunization Activity for additional information.     Screening Questionnaire for Adult Immunization    Are you sick today?   No   Do you have allergies to medications, food, a vaccine component or latex?   No   Have you ever had a serious reaction after receiving a vaccination?   No   Do you have a long-term health problem with heart, lung, kidney, or metabolic disease (e.g., diabetes), asthma, a blood disorder, no spleen, complement component deficiency, a cochlear implant, or a spinal fluid leak?  Are you on long-term aspirin therapy?   Yes   Do you have cancer, leukemia, HIV/AIDS, or any other immune system problem?   No   Do you have a parent, brother, or sister with an immune system problem?   No   In the past 3 months, have you taken medications that affect  your immune system, such as prednisone, other steroids, or anticancer drugs; drugs for the treatment of rheumatoid arthritis, Crohn s disease, or psoriasis; or have you had radiation treatments?   No   Have you had a seizure, or a brain or other nervous system problem?   No   During the past year, have you received a transfusion of blood or blood    products, or been given immune (gamma) globulin or antiviral drug?   No   For women: Are you pregnant or is there a chance you could become       pregnant during the next month?   No   Have you received any vaccinations in the past 4 weeks?   Yes     Immunization questionnaire was positive for at least one answer.  Notified Dr. Asher. OK to administer.       , injection of Shingrix given by Yamila Lehman. Patient instructed to remain in clinic for 15 minutes afterwards, and to report any adverse reaction to me immediately.       Screening performed by Yamila Lehman on 4/28/2021 at 4:52 PM.

## 2021-06-04 NOTE — PROGRESS NOTES
"    Assessment & Plan     Routine general medical examination at a health care facility  Reviewed recommended screenings and ordered appropriate testing for pt's risks and per pt's request(s).     - HEMOGLOBIN A1C    Type 2 diabetes mellitus with hyperosmolarity without coma, without long-term current use of insulin (H)  Refill given denies s/e  - HEMOGLOBIN A1C  - metFORMIN (GLUCOPHAGE) 1000 MG tablet; TAKE 1 TABLET BY MOUTH TWICE DAILY WITH MEALS . APPOINTMENT REQUIRED FOR FUTURE REFILLS    Endometrial carcinoma (H)  Pelvic exam completed      Review of prior external note(s) from - CareEverywhere information from oncology note from 9/9/2019 reviewed  No LOS data to display   Time spent doing chart review, history and exam, documentation and further activities per the note       BMI:   Estimated body mass index is 31.02 kg/m  as calculated from the following:    Height as of this encounter: 1.62 m (5' 3.78\").    Weight as of this encounter: 81.4 kg (179 lb 8 oz).   Weight management plan: Discussed healthy diet and exercise guidelines    Patient Instructions     Preventive Health Recommendations  Female Ages 50 - 64    Yearly exam: See your health care provider every year in order to  o Review health changes.   o Discuss preventive care.    o Review your medicines if your doctor has prescribed any.      Get a Pap test every three years (unless you have an abnormal result and your provider advises testing more often).    If you get Pap tests with HPV test, you only need to test every 5 years, unless you have an abnormal result.     You do not need a Pap test if your uterus was removed (hysterectomy) and you have not had cancer.    You should be tested each year for STDs (sexually transmitted diseases) if you're at risk.     Have a mammogram every 1 to 2 years.    Have a colonoscopy at age 50, or have a yearly FIT test (stool test). These exams screen for colon cancer.      Have a cholesterol test every 5 years, or " "more often if advised.    Have a diabetes test (fasting glucose) every three years. If you are at risk for diabetes, you should have this test more often.     If you are at risk for osteoporosis (brittle bone disease), think about having a bone density scan (DEXA).    Shots: Get a flu shot each year. Get a tetanus shot every 10 years.    Nutrition:     Eat at least 5 servings of fruits and vegetables each day.    Eat whole-grain bread, whole-wheat pasta and brown rice instead of white grains and rice.    Get adequate Calcium and Vitamin D.     Lifestyle    Exercise at least 150 minutes a week (30 minutes a day, 5 days a week). This will help you control your weight and prevent disease.    Limit alcohol to one drink per day.    No smoking.     Wear sunscreen to prevent skin cancer.     See your dentist every six months for an exam and cleaning.    See your eye doctor every 1 to 2 years.        Return in about 6 months (around 12/7/2021).    [facility] DOREEN Mario is a 61 year old who presents for the following health issues     HPI     Combined Dm / Lipid / Htn Visit    Question 6/4/2021  8:48 PM CDT - Filed by Patient   How often are you checking your blood sugars? Two times daily   Comments - checking blood sugars:    Average blood sugar result - am:   97   Average blood sugar result - noon:    Average blood sugar result - supper: 90   Average blood sugar result - bedtime:    Average blood sugar result - after meals (postprandial):    Which symptoms do you notice when your blood sugar is low? None   Comments: Other Symptoms of Low Blood Sugar / When do Symptoms Occur - (i.e. when blood sugars are below: ___)    Have you experienced any paresthesias (numbness or burning in feet) or sores?: Yes   Have you had a diabetic eye exam within the last year? Yes   Are you following a low fat/low cholesterol diet?   Fair   Are you taking a \"statin\" medication or " "niaspan? No   If you are taking a \"statin\" medication, have you been experiencing any muscle aches or other side effects? na   Are you taking other supplements or other medications to lower your cholesterol? No   How often are you checking your blood pressures? na   What are the results of your blood pressure checks? na   Are you following a low salt diet? No   Outside of work, how many days during the week do you exercise and how many hours each time? Walk, twice a week   Do you have any problems taking medications regularly? No   Do you have any side effects from medication? No   Are you on a special diet and if so, what kind (i.e. regular, low salt, etc)? No   Have there been any changes to your medical or surgical history? No   Have there been any changes to your social history? (tobacco use, alcohol use, sexual activity) No   Have there been any changes in your allergies? No   Have there been any changes or updates to the medications you take? No   Do any of your medications need to be refilled? Yes   Please enter the name of the pharmacy you use (clicking on the icon below will allow you to type in your answer): [facility]   Do you have any additional concerns to address? No   Over the past 2 weeks, how often have you been bothered by any of the following problems?    Q1: Little interest or pleasure in doing things Not at all   Q2: Feeling down, depressed or hopeless Not at all   PHQ-2 Score (range: 0 - 6) 0       Answers for HPI/ROS submitted by the patient on 6/4/2021   Frequency of checking blood sugars:: two times daily  Hypoglycemia symptoms:: none  Dietary sodium intake:: No    Oncology h/o of hysterectomy and R SO 12/2017 last ov with onco was 9/9/2019  Reported may continue annual pelvic exam through pcp.   Currently denies symptoms of vaginal bleeding, bowel or bladder changes, bloating, pelvic pain. Has hiatal hernia no changes in this.       Review of Systems   Constitutional, HEENT, " "cardiovascular, pulmonary, GI, , musculoskeletal, neuro, skin, endocrine and psych systems are negative, except as otherwise noted.      Objective    /74 (BP Location: Left arm, Patient Position: Sitting, Cuff Size: Adult Regular)   Pulse 77   Temp 97.3  F (36.3  C) (Temporal)   Resp 10   Ht 1.62 m (5' 3.78\")   Wt 81.4 kg (179 lb 8 oz)   SpO2 97%   BMI 31.02 kg/m    Body mass index is 31.02 kg/m .  Physical Exam   GENERAL: healthy, alert and no distress  EYES: Eyes grossly normal to inspection, PERRL and conjunctivae and sclerae normal  HENT: ear canals and TM's normal, nose and mouth without ulcers or lesions  NECK: no adenopathy, no asymmetry, masses, or scars and thyroid normal to palpation  RESP: lungs clear to auscultation - no rales, rhonchi or wheezes  BREAST: normal without masses, tenderness or nipple discharge and no palpable axillary masses or adenopathy  CV: regular rate and rhythm, normal S1 S2, no S3 or S4, no murmur, click or rub, no peripheral edema and peripheral pulses strong  ABDOMEN: soft, nontender, no hepatosplenomegaly, no masses and bowel sounds normal   (female): normal female external genitalia, normal urethral meatus  and vaginal mucosa pink, moist, well rugated  MS: no gross musculoskeletal defects noted, no edema  SKIN: no suspicious lesions or rashes  NEURO: Normal strength and tone, mentation intact and speech normal  PSYCH: mentation appears normal, affect normal/bright                    "

## 2021-06-07 ENCOUNTER — OFFICE VISIT (OUTPATIENT)
Dept: FAMILY MEDICINE | Facility: CLINIC | Age: 61
End: 2021-06-07
Payer: COMMERCIAL

## 2021-06-07 ENCOUNTER — TELEPHONE (OUTPATIENT)
Dept: FAMILY MEDICINE | Facility: CLINIC | Age: 61
End: 2021-06-07

## 2021-06-07 VITALS
SYSTOLIC BLOOD PRESSURE: 128 MMHG | WEIGHT: 179.5 LBS | TEMPERATURE: 97.3 F | HEART RATE: 77 BPM | OXYGEN SATURATION: 97 % | DIASTOLIC BLOOD PRESSURE: 74 MMHG | HEIGHT: 64 IN | RESPIRATION RATE: 10 BRPM | BODY MASS INDEX: 30.64 KG/M2

## 2021-06-07 DIAGNOSIS — C54.1 ENDOMETRIAL CARCINOMA (H): ICD-10-CM

## 2021-06-07 DIAGNOSIS — Z00.00 ROUTINE GENERAL MEDICAL EXAMINATION AT A HEALTH CARE FACILITY: Primary | ICD-10-CM

## 2021-06-07 DIAGNOSIS — E11.00 TYPE 2 DIABETES MELLITUS WITH HYPEROSMOLARITY WITHOUT COMA, WITHOUT LONG-TERM CURRENT USE OF INSULIN (H): ICD-10-CM

## 2021-06-07 LAB — HBA1C MFR BLD: 6.3 % (ref 0–5.6)

## 2021-06-07 PROCEDURE — 83036 HEMOGLOBIN GLYCOSYLATED A1C: CPT | Performed by: NURSE PRACTITIONER

## 2021-06-07 PROCEDURE — 99214 OFFICE O/P EST MOD 30 MIN: CPT | Mod: 25 | Performed by: NURSE PRACTITIONER

## 2021-06-07 PROCEDURE — 99396 PREV VISIT EST AGE 40-64: CPT | Performed by: NURSE PRACTITIONER

## 2021-06-07 PROCEDURE — 36415 COLL VENOUS BLD VENIPUNCTURE: CPT | Performed by: NURSE PRACTITIONER

## 2021-06-07 ASSESSMENT — ENCOUNTER SYMPTOMS
SORE THROAT: 0
COUGH: 0
DIZZINESS: 0
JOINT SWELLING: 0
NERVOUS/ANXIOUS: 0
MYALGIAS: 0
PALPITATIONS: 0
PARESTHESIAS: 0
ABDOMINAL PAIN: 0
HEARTBURN: 0
HEADACHES: 0
CHILLS: 0
EYE PAIN: 0
ARTHRALGIAS: 0
NAUSEA: 0
HEMATOCHEZIA: 0
CONSTIPATION: 0
SHORTNESS OF BREATH: 0
FEVER: 0
BREAST MASS: 0
WEAKNESS: 0
HEMATURIA: 0
DIARRHEA: 0
DYSURIA: 0
FREQUENCY: 0

## 2021-06-07 ASSESSMENT — MIFFLIN-ST. JEOR: SCORE: 1350.72

## 2021-06-07 ASSESSMENT — PAIN SCALES - GENERAL: PAINLEVEL: NO PAIN (0)

## 2021-06-07 NOTE — RESULT ENCOUNTER NOTE
Please advise Shelbi Howard,  1960, that her lab results were stable diabetes no change in treatment plan  [phone] (home) [phone] (work)  Thank you  Halie Hernandes CNP

## 2021-06-07 NOTE — PROGRESS NOTES
SUBJECTIVE:   CC: Shelbi Howard is an 61 year old woman who presents for preventive health visit.       Patient has been advised of split billing requirements and indicates understanding: Yes  Healthy Habits:     Getting at least 3 servings of Calcium per day:  Yes    Bi-annual eye exam:  Yes    Dental care twice a year:  NO    Sleep apnea or symptoms of sleep apnea:  None    Diet:  Carbohydrate counting    Frequency of exercise:  2-3 days/week    Duration of exercise:  30-45 minutes    Taking medications regularly:  Yes    Medication side effects:  None    PHQ-2 Total Score: 0    Additional concerns today:  No              Today's PHQ-2 Score:   PHQ-2 (  [employer]) 2021   Q1: Little interest or pleasure in doing things 0   Q2: Feeling down, depressed or hopeless 0   PHQ-2 Score 0   Q1: Little interest or pleasure in doing things Not at all   Q2: Feeling down, depressed or hopeless Not at all   PHQ-2 Score 0       Abuse: Current or Past (Physical, Sexual or Emotional) - No  Do you feel safe in your environment? Yes        Social History     Tobacco Use     Smoking status: Former Smoker     Packs/day: 0.50     Years: 38.00     Pack years: 19.00     Types: Cigarettes     Quit date: 2017     Years since quittin.3     Smokeless tobacco: Never Used   Substance Use Topics     Alcohol use: Yes     Comment: social     If you drink alcohol do you typically have >3 drinks per day or >7 drinks per week? No    No flowsheet data found.    Reviewed orders with patient.  Reviewed health maintenance and updated orders accordingly - Yes  Lab work is in process  Labs reviewed in EPIC  BP Readings from Last 3 Encounters:   21 128/74   21 126/64   19 110/68    Wt Readings from Last 3 Encounters:   21 81.4 kg (179 lb 8 oz)   21 81.4 kg (179 lb 8 oz)   19 84.8 kg (187 lb)                  Patient Active Problem List   Diagnosis     Post-menopausal bleeding     ASCUS of cervix with  negative high risk HPV     Endometrial carcinoma (H)     S/P hysterectomy     Type 2 diabetes mellitus with hyperosmolarity without coma, without long-term current use of insulin (H)     Past Surgical History:   Procedure Laterality Date     CYSTOSCOPY N/A 2017    Procedure: CYSTOSCOPY;;  Surgeon: Facundo Parekh MD;  Location: [facility] OR     DAVINCI HYSTERECTOMY TOTAL, BILATERAL SALPINGO-OOPHORECTOMY, NODE DISSECTION, COMBINED N/A 2017    Procedure: COMBINED DAVINCI HYSTERECTOMY TOTAL, SALPINGO-OOPHORECTOMY, NODE DISSECTION;  DaVinci Assisted Total Laparoscopic Hysterectomy, Right Salpingo-Oophorectomy, Lysis of Adhesions, Cystoscopy;  Surgeon: Facundo Parekh MD;  Location: [facility] OR     LAPAROSCOPIC HYSTERECTOMY TOTAL  2017     oopherectomy Left     13 lb benign mass removal       Social History     Tobacco Use     Smoking status: Former Smoker     Packs/day: 0.50     Years: 38.00     Pack years: 19.00     Types: Cigarettes     Quit date: 2017     Years since quittin.3     Smokeless tobacco: Never Used   Substance Use Topics     Alcohol use: Yes     Comment: social     Family History   Problem Relation Age of Onset     Diabetes Brother      Hypertension Mother      Hyperlipidemia Mother      Cerebrovascular Disease Mother      Influenza/Pneumonia Father 62     Diabetes Father      Kidney Disease Sister      Diabetes Sister      Diabetes Brother          Current Outpatient Medications   Medication Sig Dispense Refill     alcohol swab prep pads Use to swab area of injection/jennifer as directed. 100 each 0     aspirin 81 MG EC tablet Take 1 tablet (81 mg) by mouth daily 90 tablet 3     blood glucose monitoring (VI CONTOUR NEXT) test strip Use to test blood sugar 2 times daily. 100 strip 3     blood glucose monitoring (VI MICROLET) lancets Use to test blood sugar 2 times daily. 100 each 3     lisinopril-hydrochlorothiazide (ZESTORETIC) 10-12.5 MG tablet Take 1 tablet by mouth once daily 90  tablet 1     metFORMIN (GLUCOPHAGE) 1000 MG tablet TAKE 1 TABLET BY MOUTH TWICE DAILY WITH MEALS . APPOINTMENT REQUIRED FOR FUTURE REFILLS 180 tablet 3     simvastatin (ZOCOR) 10 MG tablet Take 1 tablet (10 mg) by mouth At Bedtime 90 tablet 3     Allergies   Allergen Reactions     Latex Itching     Recent Labs   Lab Test 06/07/21  0927 12/09/20  0711 09/10/20  0856 12/09/19  0740 06/28/19  0813 12/05/18  0839 12/05/18  0839 03/26/18  1752 03/26/18  1752 12/08/17  1458   A1C 6.3*  --  6.5* 6.6* 6.7*   < >  --    < > 6.4*  --    LDL  --  49  --  53  --   --  49  --   --  54   HDL  --  46*  --  50  --   --  45*  --   --  46*   TRIG  --  141  --  150*  --   --  127  --   --  125   CR  --   --  0.91  --  0.74  --   --    < > 0.59  --    GFRESTIMATED  --   --  67  --  87  --   --    < > >90  --    GFRESTBLACK  --   --  78  --  >90  --   --    < > >90  --    POTASSIUM  --   --  4.2  --  4.4  --   --    < > 3.9  --    TSH  --   --   --   --   --   --   --   --  0.53 0.36*    < > = values in this interval not displayed.        Breast Cancer Screening:  Any new diagnosis of family breast, ovarian, or bowel cancer? No    FSH-7: No flowsheet data found.  click delete button to remove this line now    Pertinent mammograms are reviewed under the imaging tab.    History of abnormal Pap smear: Status post hysterectomy. See additional encounter  PAP / HPV Latest Ref Rng & Units 10/17/2017   PAP - ASC-US(A)   HPV 16 DNA NEG:Negative Negative   HPV 18 DNA NEG:Negative Negative   OTHER HR HPV NEG:Negative Negative     Reviewed and updated as needed this visit by clinical staff  Tobacco  Allergies  Meds  Problems  Med Hx  Surg Hx  Fam Hx  Soc Hx          Reviewed and updated as needed this visit by Provider  Tobacco   Meds  Problems                Review of Systems   Constitutional: Negative for chills and fever.   HENT: Negative for congestion, ear pain, hearing loss and sore throat.    Eyes: Negative for pain and visual  "disturbance.   Respiratory: Negative for cough and shortness of breath.    Cardiovascular: Negative for chest pain, palpitations and peripheral edema.   Gastrointestinal: Negative for abdominal pain, constipation, diarrhea, heartburn, hematochezia and nausea.   Breasts:  Negative for tenderness, breast mass and discharge.   Genitourinary: Negative for dysuria, frequency, genital sores, hematuria, pelvic pain, urgency, vaginal bleeding and vaginal discharge.   Musculoskeletal: Negative for arthralgias, joint swelling and myalgias.   Skin: Negative for rash.   Neurological: Negative for dizziness, weakness, headaches and paresthesias.   Psychiatric/Behavioral: Negative for mood changes. The patient is not nervous/anxious.           OBJECTIVE:   /74 (BP Location: Left arm, Patient Position: Sitting, Cuff Size: Adult Regular)   Pulse 77   Temp 97.3  F (36.3  C) (Temporal)   Resp 10   Ht 1.62 m (5' 3.78\")   Wt 81.4 kg (179 lb 8 oz)   SpO2 97%   BMI 31.02 kg/m    Physical Exam  GENERAL: healthy, alert and no distress  EYES: Eyes grossly normal to inspection, PERRL and conjunctivae and sclerae normal  HENT: ear canals and TM's normal, nose and mouth without ulcers or lesions  NECK: no adenopathy, no asymmetry, masses, or scars and thyroid normal to palpation  RESP: lungs clear to auscultation - no rales, rhonchi or wheezes  BREAST: normal without masses, tenderness or nipple discharge and no palpable axillary masses or adenopathy  CV: regular rate and rhythm, normal S1 S2, no S3 or S4, no murmur, click or rub, no peripheral edema and peripheral pulses strong  ABDOMEN: soft, nontender, no hepatosplenomegaly, no masses and bowel sounds normal  MS: no gross musculoskeletal defects noted, no edema  SKIN: no suspicious lesions or rashes  NEURO: Normal strength and tone, mentation intact and speech normal  PSYCH: mentation appears normal, affect normal/bright    Diagnostic Test Results:  Labs reviewed in " "Epic    ASSESSMENT/PLAN:       ICD-10-CM    1. Routine general medical examination at a health care facility  Z00.00 HEMOGLOBIN A1C   2. Type 2 diabetes mellitus with hyperosmolarity without coma, without long-term current use of insulin (H)  E11.00 HEMOGLOBIN A1C     metFORMIN (GLUCOPHAGE) 1000 MG tablet   3. Endometrial carcinoma (H)  C54.1        Patient has been advised of split billing requirements and indicates understanding: Yes  COUNSELING:  Reviewed preventive health counseling, as reflected in patient instructions       Regular exercise       Healthy diet/nutrition       Vision screening       Osteoporosis prevention/bone health    Estimated body mass index is 31.02 kg/m  as calculated from the following:    Height as of this encounter: 1.62 m (5' 3.78\").    Weight as of this encounter: 81.4 kg (179 lb 8 oz).    Weight management plan: Discussed healthy diet and exercise guidelines    She reports that she quit smoking about 4 years ago. Her smoking use included cigarettes. She has a 19.00 pack-year smoking history. She has never used smokeless tobacco.      Counseling Resources:  ATP IV Guidelines  Pooled Cohorts Equation Calculator  Breast Cancer Risk Calculator  BRCA-Related Cancer Risk Assessment: FHS-7 Tool  FRAX Risk Assessment  ICSI Preventive Guidelines  Dietary Guidelines for Americans, 2010  USDA's MyPlate  ASA Prophylaxis  Lung CA Screening    BERLIN Jones CNP  [facility]  "

## 2021-06-07 NOTE — TELEPHONE ENCOUNTER
Selma Le RT (R) contacted Shelbi on 21 and left a message. If patient calls back please inform patient of results below, thanks    Please advise Shelbi MARSHALL Anita,  1960, that her lab results were stable diabetes no change in treatment plan   [phone] (home) [phone] (work)   Thank you   Halie Hernandes CNP

## 2021-09-24 DIAGNOSIS — I10 BENIGN ESSENTIAL HYPERTENSION: ICD-10-CM

## 2021-09-28 NOTE — TELEPHONE ENCOUNTER
Pending Prescriptions:                       Disp   Refills    lisinopril-hydrochlorothiazide (ZESTORETIC*90 tab*0        Sig: Take 1 tablet by mouth once daily        Routing refill request to provider for review/approval because:  Labs out of range:  Diuretics (Including Combos) Protocol [id number]/24/2021 09:18 AM   Normal serum creatinine on file in past 12 months Protocol Details    Normal serum potassium on file in past 12 months     Normal serum sodium on file in past 12 months        No results found for: ALT  No results found for: AST  0  NIDA CaryN, RN, PHN  [place]  September 28, 2021

## 2021-09-29 RX ORDER — LISINOPRIL/HYDROCHLOROTHIAZIDE 10-12.5 MG
TABLET ORAL
Qty: 90 TABLET | Refills: 1 | Status: SHIPPED | OUTPATIENT
Start: 2021-09-29 | End: 2022-03-16

## 2021-10-09 ENCOUNTER — HEALTH MAINTENANCE LETTER (OUTPATIENT)
Age: 61
End: 2021-10-09

## 2021-12-27 DIAGNOSIS — E11.00 TYPE 2 DIABETES MELLITUS WITH HYPEROSMOLARITY WITHOUT COMA, WITHOUT LONG-TERM CURRENT USE OF INSULIN (H): ICD-10-CM

## 2021-12-29 RX ORDER — SIMVASTATIN 10 MG
TABLET ORAL
Qty: 90 TABLET | Refills: 0 | Status: SHIPPED | OUTPATIENT
Start: 2021-12-29 | End: 2022-03-16

## 2021-12-29 NOTE — TELEPHONE ENCOUNTER
Pending Prescriptions:                       Disp   Refills    simvastatin (ZOCOR) 10 MG tablet [Pharmac*90 tab*0            Sig: TAKE 1 TABLET BY MOUTH AT BEDTIME    Medication is being filled for 1 time diane refill only due to:  Patient is due for med check and fasting labs    Please call and help schedule.  Thank you!

## 2022-01-29 ENCOUNTER — HEALTH MAINTENANCE LETTER (OUTPATIENT)
Age: 62
End: 2022-01-29

## 2022-02-01 ENCOUNTER — TRANSFERRED RECORDS (OUTPATIENT)
Dept: MULTI SPECIALTY CLINIC | Facility: CLINIC | Age: 62
End: 2022-02-01
Payer: COMMERCIAL

## 2022-02-01 LAB — RETINOPATHY: NORMAL

## 2022-03-02 NOTE — PROGRESS NOTES
"  Assessment & Plan     Type 2 diabetes mellitus without complication, without long-term current use of insulin (H)  Refilled medications.   Updated labs.  Foot exam completed  a1c < 7%- Continue on metformin  Encouraged more consistent exercise.   Recheck in 6mo   - Comprehensive metabolic panel; Future  - Hemoglobin A1c; Future  - Lipid panel reflex to direct LDL Fasting; Future  - Albumin Random Urine Quantitative with Creat Ratio; Future  - FOOT EXAM  - Comprehensive metabolic panel  - Hemoglobin A1c  - Lipid panel reflex to direct LDL Fasting  - Albumin Random Urine Quantitative with Creat Ratio  - simvastatin (ZOCOR) 10 MG tablet; Take 1 tablet (10 mg) by mouth At Bedtime  - metFORMIN (GLUCOPHAGE) 1000 MG tablet; Take 1 tablet (1,000 mg) by mouth 2 times daily (with meals)    Benign essential hypertension  BP at goal. Refilled medication.   - lisinopril-hydrochlorothiazide (ZESTORETIC) 10-12.5 MG tablet; Take 1 tablet by mouth daily    Endometrial carcinoma (H)  Due for pelvic exam. She declines today. Will do summer /fall 2022 with an annual exam.   Reviewed last note from gyn/onc-  vaginal cuff paps NOT recommended.      BMI:   Estimated body mass index is 32 kg/m  as calculated from the following:    Height as of this encounter: 1.592 m (5' 2.68\").    Weight as of this encounter: 81.1 kg (178 lb 12.8 oz).   Weight management plan: Discussed healthy diet and exercise guidelines    Follow Up: The patient was instructed to contact clinic for worsening symptoms, non-improvement in time frame as expected/discussed, and for questions regarding medications or treatment plan. For virtual visits, the patient was advised to be seen for in person evaluation if symptoms or condition are worsening or non-improvement as expected.       Return in about 3 months (around 6/16/2022) for Physical Exam.    Tricia Baxter PA-C  [facility] DOREEN Mario is a 62 year old who presents for the " following health issues     History of Present Illness       Diabetes:   She presents for follow up of diabetes.  She is checking home blood glucose one time daily. She checks blood glucose before meals.  Blood glucose is never over 200 and never under 70. When her blood glucose is low, the patient is asymptomatic for confusion, blurred vision, lethargy and reports not feeling dizzy, shaky, or weak.  She has no concerns regarding her diabetes at this time.  She is having burning in feet. The patient has had a diabetic eye exam in the last 12 months. Eye exam performed on 2/2022. Location of last eye exam [facility].        She eats 0-1 servings of fruits and vegetables daily.She consumes 1 sweetened beverage(s) daily.She exercises with enough effort to increase her heart rate 10 to 19 minutes per day.  She exercises with enough effort to increase her heart rate 3 or less days per week.   She is taking medications regularly.     New to this provider. Establish care. Was seeing Halie Kelly NP (who left primary care for urgent care).       Diabetes- type 2- fasting for labs (dx )  Medications: metformin   Last a1c: 6.3% (June 2021)  ACEi/ARB: lisinopril  Statin: simvastatin   Eye exam: [facility] in [place] - Feb 2021. Due now.   Foot concerns/exam: at night- tingling/cold feel. Nightly. 2 years.   Blood sugars: 1x daily. Before supper- 90s. Not checking am fasting.   Hypoglycemia: no   Exercise: not doing anything now. Walks in spring- summer.   Weight: stable.     HTN- lisinopril-hydrochlorothiazide - checking at home BP is at goal.    S/p hysterectomy for endometrial cancer- 12/05/2017.   No radiation or chemotherapy   Denies vaginal bleeding, discharge, pelvic pain. Is not sexually active.   Thinks she had pelvic exam summer 2021 at annual exam.   Last Gyn Onc visit- 09/09/2019- Michelle Clark NP.- Annual pelvic exam advised (ok to do with primary care), surveillance paps NOT recommended.     Lives  "with her mother.     Review of Systems   Constitutional, HEENT, cardiovascular, pulmonary, gi and gu systems are negative, except as otherwise noted.      Objective    /64   Pulse 59   Temp 97.2  F (36.2  C) (Temporal)   Resp 14   Ht 1.592 m (5' 2.68\")   Wt 81.1 kg (178 lb 12.8 oz)   SpO2 99%   BMI 32.00 kg/m    Body mass index is 32 kg/m .  Physical Exam   GENERAL: healthy, alert and no distress  EYES: Eyes grossly normal to inspection, PERRL and conjunctivae and sclerae normal  HENT: ear canals and TM's normal, nose and mouth without ulcers or lesions  NECK: no adenopathy, no asymmetry, masses, or scars and thyroid normal to palpation  RESP: lungs clear to auscultation - no rales, rhonchi or wheezes  CV: regular rate and rhythm, normal S1 S2, no S3 or S4, no murmur, click or rub, no peripheral edema and peripheral pulses strong  ABDOMEN: soft, nontender, no hepatosplenomegaly, no masses and bowel sounds normal  MS: no gross musculoskeletal defects noted, no edema  SKIN: no suspicious lesions or rashes  NEURO: Normal strength and tone, mentation intact and speech normal  PSYCH: mentation appears normal, affect normal/bright  Diabetic foot exam: normal DP and PT pulses, no trophic changes or ulcerative lesions, normal sensory exam and normal monofilament exam    Results for orders placed or performed in visit on 03/16/22 (from the past 24 hour(s))   Hemoglobin A1c   Result Value Ref Range    Hemoglobin A1C 6.8 (H) 0.0 - 5.6 %               "

## 2022-03-16 ENCOUNTER — OFFICE VISIT (OUTPATIENT)
Dept: FAMILY MEDICINE | Facility: CLINIC | Age: 62
End: 2022-03-16
Payer: COMMERCIAL

## 2022-03-16 VITALS
SYSTOLIC BLOOD PRESSURE: 124 MMHG | TEMPERATURE: 97.2 F | HEIGHT: 63 IN | WEIGHT: 178.8 LBS | DIASTOLIC BLOOD PRESSURE: 64 MMHG | HEART RATE: 59 BPM | OXYGEN SATURATION: 99 % | BODY MASS INDEX: 31.68 KG/M2 | RESPIRATION RATE: 14 BRPM

## 2022-03-16 DIAGNOSIS — E11.9 TYPE 2 DIABETES MELLITUS WITHOUT COMPLICATION, WITHOUT LONG-TERM CURRENT USE OF INSULIN (H): Primary | ICD-10-CM

## 2022-03-16 DIAGNOSIS — C54.1 ENDOMETRIAL CARCINOMA (H): ICD-10-CM

## 2022-03-16 DIAGNOSIS — I10 BENIGN ESSENTIAL HYPERTENSION: ICD-10-CM

## 2022-03-16 LAB
ALBUMIN SERPL-MCNC: 3.8 G/DL (ref 3.4–5)
ALP SERPL-CCNC: 54 U/L (ref 40–150)
ALT SERPL W P-5'-P-CCNC: 32 U/L (ref 0–50)
ANION GAP SERPL CALCULATED.3IONS-SCNC: 9 MMOL/L (ref 3–14)
AST SERPL W P-5'-P-CCNC: 18 U/L (ref 0–45)
BILIRUB SERPL-MCNC: 0.3 MG/DL (ref 0.2–1.3)
BUN SERPL-MCNC: 32 MG/DL (ref 7–30)
CALCIUM SERPL-MCNC: 10.1 MG/DL (ref 8.5–10.1)
CHLORIDE BLD-SCNC: 107 MMOL/L (ref 94–109)
CHOLEST SERPL-MCNC: 119 MG/DL
CO2 SERPL-SCNC: 23 MMOL/L (ref 20–32)
CREAT SERPL-MCNC: 0.91 MG/DL (ref 0.52–1.04)
CREAT UR-MCNC: 156 MG/DL
FASTING STATUS PATIENT QL REPORTED: YES
GFR SERPL CREATININE-BSD FRML MDRD: 70 ML/MIN/1.73M2
GLUCOSE BLD-MCNC: 135 MG/DL (ref 70–99)
HBA1C MFR BLD: 6.8 % (ref 0–5.6)
HDLC SERPL-MCNC: 50 MG/DL
LDLC SERPL CALC-MCNC: 38 MG/DL
MICROALBUMIN UR-MCNC: 11 MG/L
MICROALBUMIN/CREAT UR: 7.05 MG/G CR (ref 0–25)
NONHDLC SERPL-MCNC: 69 MG/DL
POTASSIUM BLD-SCNC: 4.6 MMOL/L (ref 3.4–5.3)
PROT SERPL-MCNC: 7.7 G/DL (ref 6.8–8.8)
SODIUM SERPL-SCNC: 139 MMOL/L (ref 133–144)
TRIGL SERPL-MCNC: 153 MG/DL

## 2022-03-16 PROCEDURE — 36415 COLL VENOUS BLD VENIPUNCTURE: CPT | Performed by: PHYSICIAN ASSISTANT

## 2022-03-16 PROCEDURE — 99207 PR FOOT EXAM NO CHARGE: CPT | Performed by: PHYSICIAN ASSISTANT

## 2022-03-16 PROCEDURE — 82043 UR ALBUMIN QUANTITATIVE: CPT | Performed by: PHYSICIAN ASSISTANT

## 2022-03-16 PROCEDURE — 80061 LIPID PANEL: CPT | Performed by: PHYSICIAN ASSISTANT

## 2022-03-16 PROCEDURE — 99214 OFFICE O/P EST MOD 30 MIN: CPT | Performed by: PHYSICIAN ASSISTANT

## 2022-03-16 PROCEDURE — 83036 HEMOGLOBIN GLYCOSYLATED A1C: CPT | Performed by: PHYSICIAN ASSISTANT

## 2022-03-16 PROCEDURE — 80053 COMPREHEN METABOLIC PANEL: CPT | Performed by: PHYSICIAN ASSISTANT

## 2022-03-16 RX ORDER — SIMVASTATIN 10 MG
10 TABLET ORAL AT BEDTIME
Qty: 90 TABLET | Refills: 3 | Status: SHIPPED | OUTPATIENT
Start: 2022-03-16 | End: 2023-04-03

## 2022-03-16 RX ORDER — SIMVASTATIN 10 MG
10 TABLET ORAL AT BEDTIME
Qty: 90 TABLET | Refills: 0 | Status: CANCELLED | OUTPATIENT
Start: 2022-03-16

## 2022-03-16 RX ORDER — LISINOPRIL/HYDROCHLOROTHIAZIDE 10-12.5 MG
1 TABLET ORAL DAILY
Qty: 90 TABLET | Refills: 1 | Status: CANCELLED | OUTPATIENT
Start: 2022-03-16

## 2022-03-16 RX ORDER — LISINOPRIL/HYDROCHLOROTHIAZIDE 10-12.5 MG
1 TABLET ORAL DAILY
Qty: 90 TABLET | Refills: 1 | Status: SHIPPED | OUTPATIENT
Start: 2022-03-16 | End: 2022-09-13

## 2022-03-16 ASSESSMENT — PAIN SCALES - GENERAL: PAINLEVEL: NO PAIN (0)

## 2022-04-22 ENCOUNTER — TRANSFERRED RECORDS (OUTPATIENT)
Dept: HEALTH INFORMATION MANAGEMENT | Facility: CLINIC | Age: 62
End: 2022-04-22

## 2022-04-22 LAB — RETINOPATHY: NEGATIVE

## 2022-07-10 ENCOUNTER — HEALTH MAINTENANCE LETTER (OUTPATIENT)
Age: 62
End: 2022-07-10

## 2022-09-11 ENCOUNTER — HEALTH MAINTENANCE LETTER (OUTPATIENT)
Age: 62
End: 2022-09-11

## 2022-09-13 DIAGNOSIS — E11.9 TYPE 2 DIABETES MELLITUS WITHOUT COMPLICATION, WITHOUT LONG-TERM CURRENT USE OF INSULIN (H): ICD-10-CM

## 2022-09-13 DIAGNOSIS — I10 BENIGN ESSENTIAL HYPERTENSION: ICD-10-CM

## 2022-09-13 RX ORDER — LISINOPRIL/HYDROCHLOROTHIAZIDE 10-12.5 MG
TABLET ORAL
Qty: 90 TABLET | Refills: 0 | Status: SHIPPED | OUTPATIENT
Start: 2022-09-13 | End: 2022-10-05

## 2022-09-13 NOTE — TELEPHONE ENCOUNTER
Patient has pending appointment with provider in October.  Needing refills.  Prescription's sent to pharmacy.  States will keep her appointment.  Millie FLORES RN

## 2022-10-05 ENCOUNTER — OFFICE VISIT (OUTPATIENT)
Dept: FAMILY MEDICINE | Facility: CLINIC | Age: 62
End: 2022-10-05
Payer: COMMERCIAL

## 2022-10-05 VITALS
WEIGHT: 177.8 LBS | HEART RATE: 62 BPM | DIASTOLIC BLOOD PRESSURE: 62 MMHG | OXYGEN SATURATION: 98 % | RESPIRATION RATE: 16 BRPM | TEMPERATURE: 97.3 F | SYSTOLIC BLOOD PRESSURE: 128 MMHG | BODY MASS INDEX: 31.82 KG/M2

## 2022-10-05 DIAGNOSIS — E11.9 TYPE 2 DIABETES MELLITUS WITHOUT COMPLICATION, WITHOUT LONG-TERM CURRENT USE OF INSULIN (H): ICD-10-CM

## 2022-10-05 DIAGNOSIS — I10 BENIGN ESSENTIAL HYPERTENSION: ICD-10-CM

## 2022-10-05 DIAGNOSIS — Z12.11 SPECIAL SCREENING FOR MALIGNANT NEOPLASMS, COLON: ICD-10-CM

## 2022-10-05 DIAGNOSIS — C54.1 ENDOMETRIAL CARCINOMA (H): ICD-10-CM

## 2022-10-05 DIAGNOSIS — Z00.00 ROUTINE GENERAL MEDICAL EXAMINATION AT A HEALTH CARE FACILITY: Primary | ICD-10-CM

## 2022-10-05 DIAGNOSIS — Z12.31 ENCOUNTER FOR SCREENING MAMMOGRAM FOR MALIGNANT NEOPLASM OF BREAST: ICD-10-CM

## 2022-10-05 LAB — HBA1C MFR BLD: 6.7 % (ref 0–5.6)

## 2022-10-05 PROCEDURE — 91313 COVID-19,PF,MODERNA BIVALENT: CPT | Performed by: PHYSICIAN ASSISTANT

## 2022-10-05 PROCEDURE — 0134A COVID-19,PF,MODERNA BIVALENT: CPT | Performed by: PHYSICIAN ASSISTANT

## 2022-10-05 PROCEDURE — 90471 IMMUNIZATION ADMIN: CPT | Performed by: PHYSICIAN ASSISTANT

## 2022-10-05 PROCEDURE — 90677 PCV20 VACCINE IM: CPT | Performed by: PHYSICIAN ASSISTANT

## 2022-10-05 PROCEDURE — 83036 HEMOGLOBIN GLYCOSYLATED A1C: CPT | Performed by: PHYSICIAN ASSISTANT

## 2022-10-05 PROCEDURE — 99396 PREV VISIT EST AGE 40-64: CPT | Mod: 25 | Performed by: PHYSICIAN ASSISTANT

## 2022-10-05 PROCEDURE — 36415 COLL VENOUS BLD VENIPUNCTURE: CPT | Performed by: PHYSICIAN ASSISTANT

## 2022-10-05 PROCEDURE — 99213 OFFICE O/P EST LOW 20 MIN: CPT | Mod: 25 | Performed by: PHYSICIAN ASSISTANT

## 2022-10-05 RX ORDER — LISINOPRIL/HYDROCHLOROTHIAZIDE 10-12.5 MG
1 TABLET ORAL DAILY
Qty: 90 TABLET | Refills: 1 | Status: SHIPPED | OUTPATIENT
Start: 2022-10-05 | End: 2023-04-06

## 2022-10-05 ASSESSMENT — PAIN SCALES - GENERAL: PAINLEVEL: NO PAIN (0)

## 2022-10-05 NOTE — PROGRESS NOTES
"  Assessment & Plan     Routine general medical examination at a health care facility  Reviewed VS and weight/BMI with pt   Immunizations:  updated - see below . Declines flu vaccine- states never gets one.   Counseling as below   Health screenings/maintenance per orders/comments below  When applicable based on age, personal hx, fam hx, and RF- counseled on appropriate cancer screenings.   - Pneumococcal 20 Valent Conjugate (Prevnar 20)  - REVIEW OF HEALTH MAINTENANCE PROTOCOL ORDERS  - COVID-19,PF,MODERNA BIVALENT 18+Yrs  - MA Screen Bilateral w/Meño; Future  - Colonoscopy Screening  Referral; Future    Endometrial carcinoma (H)  Reviewed last note from gyn/onc-  vaginal cuff paps NOT recommended.   Exam completed today- normal.     Type 2 diabetes mellitus without complication, without long-term current use of insulin (H)  Update a1c today- it is at goal. Other labs are up to date  Will send eye exam to abstraction.   Continue plan of care.  Visit in 6 mo   - HEMOGLOBIN A1C; Future    Encounter for screening mammogram for malignant neoplasm of breast  Last Nov 2020. She is due. Advised to get scheduled. She will call.   - MA Screen Bilateral w/Meño; Future    Special screening for malignant neoplasms, colon  Will be due April 2023. Order placed, she will plan for in spring.   - Colonoscopy Screening  Referral; Future         BMI:   Estimated body mass index is 31.82 kg/m  as calculated from the following:    Height as of 3/16/22: 1.592 m (5' 2.68\").    Weight as of this encounter: 80.6 kg (177 lb 12.8 oz).   Weight management plan: Discussed healthy diet and exercise guidelines    Follow Up: The patient was instructed to contact clinic for worsening symptoms, non-improvement in time frame as expected/discussed, and for questions regarding medications or treatment plan. For virtual visits, the patient was advised to be seen for in person evaluation if symptoms or condition are worsening or " non-improvement as expected.       Return in about 6 months (around 4/5/2023).    ADARSH Gibson [facility] DOREEN Mario is a 62 year old, presenting for the following health issues:  No chief complaint on file.      History of Present Illness       Diabetes:   She presents for follow up of diabetes.  She is checking home blood glucose one time daily. She checks blood glucose before meals.  Blood glucose is never over 200 and never under 70. When her blood glucose is low, the patient is asymptomatic for confusion, blurred vision, lethargy and reports not feeling dizzy, shaky, or weak.  She has no concerns regarding her diabetes at this time.  She is not experiencing numbness or burning in feet, excessive thirst, blurry vision, weight changes or redness, sores or blisters on feet.         She eats 2-3 servings of fruits and vegetables daily.She consumes 1 sweetened beverage(s) daily.She exercises with enough effort to increase her heart rate 9 or less minutes per day.  She exercises with enough effort to increase her heart rate 3 or less days per week.   She is taking medications regularly.     Routine Health Maintenance:  Fasting: yes  Immunizations Due For: flu, covid, prevnar 20  Mammogram: last 11/2020. Fam hx of breast cancer: no.   Colonoscopy: 04/2018- polyp. F/U in 5 yrs. Fam hx of colon cancer: no      GYN Hx: s/p hyst due to endometrial cancer. No bleeding or pain. Not sexually active. Pap no longer indicated but does need exam annually.   Denies GYN concerns of PCB, pelvic pain, dyspareunia, vaginal discharge      Diabetes  Medications: metformin  Last a1c: 6.8% March 2022  ACEi/ARB: lisinopril  Statin: simvastatin  Eye exam: 04/2022- brought letter to send to [facility]   Foot concerns/exam: tingling cold feet nightly x2 yr  Blood sugars: 2x per day- before breakfast always under 120, nighttime before dinner 90s.  Hypoglycemia:  no  Exercise: walking a few  blocks  Weight: stable         Review of Systems   Constitutional, HEENT, cardiovascular, pulmonary, gi and gu systems are negative, except as otherwise noted.      Objective    /62   Pulse 62   Temp 97.3  F (36.3  C) (Temporal)   Resp 16   Wt 80.6 kg (177 lb 12.8 oz)   SpO2 98%   BMI 31.82 kg/m    Body mass index is 31.82 kg/m .  Physical Exam   GENERAL: healthy, alert and no distress  EYES: Eyes grossly normal to inspection, PERRL and conjunctivae and sclerae normal  HENT: ear canals and TM's normal, nose and mouth without ulcers or lesions  NECK: no adenopathy, no asymmetry, masses, or scars and thyroid normal to palpation  RESP: lungs clear to auscultation - no rales, rhonchi or wheezes  BREAST: normal without masses, tenderness or nipple discharge and no palpable axillary masses or adenopathy  CV: regular rate and rhythm, normal S1 S2, no S3 or S4, no murmur, click or rub, no peripheral edema and peripheral pulses strong  ABDOMEN: soft, nontender, no hepatosplenomegaly, no masses and bowel sounds normal   (female): normal female external genitalia, normal urethral meatus , vaginal mucosa pink, moist, well rugated and normal post-hysterectomy exam without masses.   MS: no gross musculoskeletal defects noted, no edema  SKIN: no suspicious lesions or rashes  NEURO: Normal strength and tone, mentation intact and speech normal  PSYCH: mentation appears normal, affect normal/bright  LYMPH: normal ant/post cervical, supraclavicular nodes    Results for orders placed or performed in visit on 10/05/22   HEMOGLOBIN A1C     Status: Abnormal   Result Value Ref Range    Hemoglobin A1C 6.7 (H) 0.0 - 5.6 %

## 2022-10-05 NOTE — PATIENT INSTRUCTIONS
Plan for mammogram this fall. Last Nov 2020.     Plan for colonoscopy April 2023 or after.     Prevnar 20 and covid booster today.      Preventive Health Recommendations  Female Ages 50 - 64    Yearly exam: See your health care provider every year in order to  Review health changes.   Discuss preventive care.    Review your medicines if your doctor has prescribed any.    Get a Pap test every three years (unless you have an abnormal result and your provider advises testing more often).  If you get Pap tests with HPV test, you only need to test every 5 years, unless you have an abnormal result.   You do not need a Pap test if your uterus was removed (hysterectomy) and you have not had cancer.  You should be tested each year for STDs (sexually transmitted diseases) if you're at risk.   Have a mammogram every 1 to 2 years.  Have a colonoscopy at age 50, or have a yearly FIT test (stool test). These exams screen for colon cancer.    Have a cholesterol test every 5 years, or more often if advised.  Have a diabetes test (fasting glucose) every three years. If you are at risk for diabetes, you should have this test more often.   If you are at risk for osteoporosis (brittle bone disease), think about having a bone density scan (DEXA).    Shots: Get a flu shot each year. Get a tetanus shot every 10 years.    Nutrition:   Eat at least 5 servings of fruits and vegetables each day.  Eat whole-grain bread, whole-wheat pasta and brown rice instead of white grains and rice.  Get adequate Calcium and Vitamin D.     Lifestyle  Exercise at least 150 minutes a week (30 minutes a day, 5 days a week). This will help you control your weight and prevent disease.  Limit alcohol to one drink per day.  No smoking.   Wear sunscreen to prevent skin cancer.   See your dentist every six months for an exam and cleaning.  See your eye doctor every 1 to 2 years.

## 2022-10-05 NOTE — NURSING NOTE
Prior to immunization administration, verified patients identity using patient s name and date of birth. Please see Immunization Activity for additional information.     Screening Questionnaire for Adult Immunization    Are you sick today?   No   Do you have allergies to medications, food, a vaccine component or latex?   No   Have you ever had a serious reaction after receiving a vaccination?   No   Do you have a long-term health problem with heart, lung, kidney, or metabolic disease (e.g., diabetes), asthma, a blood disorder, no spleen, complement component deficiency, a cochlear implant, or a spinal fluid leak?  Are you on long-term aspirin therapy?   No   Do you have cancer, leukemia, HIV/AIDS, or any other immune system problem?   No   Do you have a parent, brother, or sister with an immune system problem?   No   In the past 3 months, have you taken medications that affect  your immune system, such as prednisone, other steroids, or anticancer drugs; drugs for the treatment of rheumatoid arthritis, Crohn s disease, or psoriasis; or have you had radiation treatments?   No   Have you had a seizure, or a brain or other nervous system problem?   No   During the past year, have you received a transfusion of blood or blood    products, or been given immune (gamma) globulin or antiviral drug?   No   For women: Are you pregnant or is there a chance you could become       pregnant during the next month?   No   Have you received any vaccinations in the past 4 weeks?   No     Immunization questionnaire answers were all negative.      . Patient instructed to remain in clinic for 15 minutes afterwards, and to report any adverse reaction to me immediately.       Screening performed by Aishwarya Gan on 10/5/2022 at 7:37 AM.

## 2022-10-19 ENCOUNTER — ANCILLARY PROCEDURE (OUTPATIENT)
Dept: MAMMOGRAPHY | Facility: OTHER | Age: 62
End: 2022-10-19
Attending: PHYSICIAN ASSISTANT
Payer: COMMERCIAL

## 2022-10-19 DIAGNOSIS — Z00.00 ROUTINE GENERAL MEDICAL EXAMINATION AT A HEALTH CARE FACILITY: ICD-10-CM

## 2022-10-19 DIAGNOSIS — Z12.31 ENCOUNTER FOR SCREENING MAMMOGRAM FOR MALIGNANT NEOPLASM OF BREAST: ICD-10-CM

## 2022-10-19 PROCEDURE — 77067 SCR MAMMO BI INCL CAD: CPT | Mod: TC | Performed by: RADIOLOGY

## 2022-10-19 PROCEDURE — 77063 BREAST TOMOSYNTHESIS BI: CPT | Mod: TC | Performed by: RADIOLOGY

## 2023-01-05 NOTE — PROGRESS NOTES
Physical Therapy Visit    Visit Type: Daily Treatment Note  Visit: 5  Referring Provider: Susanna Hunter PT  Medical Diagnosis (from order): Diagnosis Information    Diagnosis  457.1 (ICD-9-CM) - I89.0 (ICD-10-CM) - Lymphedema of upper extremity         SUBJECTIVE                                                                                                               Patient reports cancelled due to rash from a virus which has since cleared. Patient received [employer] Flexitouch Plus.  To have training next wednesday on the machine  Functional Change: Continue same     OBJECTIVE                                                                                                                              Stemmer's/Skin Condition  Continues with left UE and truncal lymphedema        Treatment     Manual Therapy   Lymphatic Drainage:     - Position: supine (side lying)    - Body region: deep breathing, abdominal sequencing, full neck, left axilla, right axilla, axillo-axillary anastomosis, left axillo-inguinal anastomosis, right axillo-inguinal anastomosis and RUE  Compression Bandage:    - Body region: LUE -    - Layer 1: stockinette    - Layer 2: open cell foam    - Layer 3: short stretch bandaging (6 cm, 8 cm, 8cm)    - No increase in finger girth and therefore continue to defer from finger bandaging    Skilled input: verbal instruction/cues and as detailed above    Writer verbally educated and received verbal consent for hand placement, positioning of patient, and techniques to be performed today from patient for clothing adjustments for techniques, therapist position for techniques and hand placement and palpation for techniques as described above and how they are pertinent to the patient's plan of care.    Home Exercise Program  Continue same      ASSESSMENT                                                                                                            Patient with continued significant  Post operative call attempted. Patient unavailable. Voicemail and call back number left.     Barby Mayorga RN     lymphedema left UE and trunk.  Encourage more consistent multlayer bandaging at home.  Await results of newly aquired Flexitouch pneumatic device  Education:   - Results of above outlined education: Verbalizes understanding and Demonstrates understanding    PLAN                                                                                                                           Suggestions for next session as indicated: Progress per plan of care       Therapy procedure time and total treatment time can be found documented on the Time Entry flowsheet

## 2023-01-20 NOTE — TELEPHONE ENCOUNTER
Lisinopril-  Sent on 05/31/2018 with 30 day supply. Refill not appropriate at this time.   Layne Gutierres, RN, BSN       [MRI] : MRI [Right] : of the right [Lower Extremities] : lower extremities [Report was reviewed and noted in the chart] : The report was reviewed and noted in the chart [I independently reviewed and interpreted images and report] : I independently reviewed and interpreted images and report [FreeTextEntry1] : 1. Nonspecific findings include moderate diffuse circumferential subcutaneous edema surrounding the entire \par right lower extremity greatest anteriorly and distally and moderate thickening of the subcutaneous soft tissues \par ventral to the mid to distal tibial shaft without evidence of acute fracture, malalignment, or acute muscle injury.\par 2. Moderate Achilles tendinopathy.

## 2023-03-15 ENCOUNTER — TRANSFERRED RECORDS (OUTPATIENT)
Dept: FAMILY MEDICINE | Facility: CLINIC | Age: 63
End: 2023-03-15
Payer: COMMERCIAL

## 2023-03-15 LAB
RETINOPATHY: NEGATIVE
RETINOPATHY: NEGATIVE

## 2023-03-28 ENCOUNTER — TELEPHONE (OUTPATIENT)
Dept: FAMILY MEDICINE | Facility: CLINIC | Age: 63
End: 2023-03-28
Payer: COMMERCIAL

## 2023-03-28 DIAGNOSIS — E11.9 TYPE 2 DIABETES MELLITUS WITHOUT COMPLICATION, WITHOUT LONG-TERM CURRENT USE OF INSULIN (H): ICD-10-CM

## 2023-03-30 NOTE — TELEPHONE ENCOUNTER
"Pending Prescriptions:                       Disp   Refills    simvastatin (ZOCOR) 10 MG tablet [Pharmacy*90 tab*0        Sig: TAKE 1 TABLET BY MOUTH AT BEDTIME        Routing refill request to provider for review/approval because:    Statins Protocol Failed    Rerun Protocol (3/28/2023 9:03 AM)    LDL on file in past 12 months        Recent Labs   Lab Test 03/16/22  1023   LDL 38       No abnormal creatine kinase in past 12 months    No lab results found.        Recent (12 mo) or future (30 days) visit within the authorizing provider's specialty    Patient has had an office visit with the authorizing provider or a provider within the authorizing providers department within the previous 12 mos or has a future within next 30 days. See \"Patient Info\" tab in inbasket, or \"Choose Columns\" in Meds & Orders section of the refill encounter.         Medication is active on med list      Patient is age 18 or older      No active pregnancy on record      No positive pregnancy test in past 12 months        "

## 2023-04-02 ENCOUNTER — MYC MEDICAL ADVICE (OUTPATIENT)
Dept: FAMILY MEDICINE | Facility: CLINIC | Age: 63
End: 2023-04-02
Payer: COMMERCIAL

## 2023-04-03 RX ORDER — SIMVASTATIN 10 MG
TABLET ORAL
Qty: 90 TABLET | Refills: 0 | Status: SHIPPED | OUTPATIENT
Start: 2023-04-03 | End: 2023-04-06

## 2023-04-03 NOTE — TELEPHONE ENCOUNTER
Sent mychart to patient stating this was done already.    Jossy Ellison CMA ([facility])

## 2023-04-03 NOTE — TELEPHONE ENCOUNTER
Pt is due for her 6mo diabetes visit and fasting lab work.   Please schedule  Refilled simvastatin x 90d.  Tricia Baxter PA-C

## 2023-04-06 ENCOUNTER — OFFICE VISIT (OUTPATIENT)
Dept: FAMILY MEDICINE | Facility: CLINIC | Age: 63
End: 2023-04-06
Payer: COMMERCIAL

## 2023-04-06 VITALS
RESPIRATION RATE: 17 BRPM | DIASTOLIC BLOOD PRESSURE: 60 MMHG | BODY MASS INDEX: 30.83 KG/M2 | HEIGHT: 63 IN | HEART RATE: 59 BPM | OXYGEN SATURATION: 99 % | SYSTOLIC BLOOD PRESSURE: 128 MMHG | TEMPERATURE: 97.8 F | WEIGHT: 174 LBS

## 2023-04-06 DIAGNOSIS — E11.9 TYPE 2 DIABETES MELLITUS WITHOUT COMPLICATION, WITHOUT LONG-TERM CURRENT USE OF INSULIN (H): Primary | ICD-10-CM

## 2023-04-06 DIAGNOSIS — Z78.0 ASYMPTOMATIC UNCOMPLICATED MENOPAUSE: ICD-10-CM

## 2023-04-06 DIAGNOSIS — K43.9 VENTRAL HERNIA WITHOUT OBSTRUCTION OR GANGRENE: ICD-10-CM

## 2023-04-06 DIAGNOSIS — Z12.11 SPECIAL SCREENING FOR MALIGNANT NEOPLASMS, COLON: ICD-10-CM

## 2023-04-06 DIAGNOSIS — Z12.11 SCREEN FOR COLON CANCER: ICD-10-CM

## 2023-04-06 DIAGNOSIS — I10 BENIGN ESSENTIAL HYPERTENSION: ICD-10-CM

## 2023-04-06 DIAGNOSIS — C54.1 ENDOMETRIAL CARCINOMA (H): ICD-10-CM

## 2023-04-06 PROBLEM — N95.0 POST-MENOPAUSAL BLEEDING: Status: RESOLVED | Noted: 2017-10-17 | Resolved: 2023-04-06

## 2023-04-06 LAB
ALBUMIN SERPL-MCNC: 4.1 G/DL (ref 3.4–5)
ALP SERPL-CCNC: 62 U/L (ref 40–150)
ALT SERPL W P-5'-P-CCNC: 28 U/L (ref 0–50)
ANION GAP SERPL CALCULATED.3IONS-SCNC: 2 MMOL/L (ref 3–14)
AST SERPL W P-5'-P-CCNC: 15 U/L (ref 0–45)
BILIRUB SERPL-MCNC: 0.3 MG/DL (ref 0.2–1.3)
BUN SERPL-MCNC: 37 MG/DL (ref 7–30)
CALCIUM SERPL-MCNC: 9.5 MG/DL (ref 8.5–10.1)
CHLORIDE BLD-SCNC: 108 MMOL/L (ref 94–109)
CHOLEST SERPL-MCNC: 105 MG/DL
CO2 SERPL-SCNC: 31 MMOL/L (ref 20–32)
CREAT SERPL-MCNC: 0.98 MG/DL (ref 0.52–1.04)
CREAT UR-MCNC: 102 MG/DL
FASTING STATUS PATIENT QL REPORTED: YES
GFR SERPL CREATININE-BSD FRML MDRD: 64 ML/MIN/1.73M2
GLUCOSE BLD-MCNC: 138 MG/DL (ref 70–99)
HBA1C MFR BLD: 6.7 % (ref 0–5.6)
HDLC SERPL-MCNC: 38 MG/DL
LDLC SERPL CALC-MCNC: 38 MG/DL
MICROALBUMIN UR-MCNC: 9 MG/L
MICROALBUMIN/CREAT UR: 8.82 MG/G CR (ref 0–25)
NONHDLC SERPL-MCNC: 67 MG/DL
POTASSIUM BLD-SCNC: 4.1 MMOL/L (ref 3.4–5.3)
PROT SERPL-MCNC: 7.8 G/DL (ref 6.8–8.8)
SODIUM SERPL-SCNC: 141 MMOL/L (ref 133–144)
TRIGL SERPL-MCNC: 147 MG/DL

## 2023-04-06 PROCEDURE — 80061 LIPID PANEL: CPT | Performed by: PHYSICIAN ASSISTANT

## 2023-04-06 PROCEDURE — 80053 COMPREHEN METABOLIC PANEL: CPT | Performed by: PHYSICIAN ASSISTANT

## 2023-04-06 PROCEDURE — 82043 UR ALBUMIN QUANTITATIVE: CPT | Performed by: PHYSICIAN ASSISTANT

## 2023-04-06 PROCEDURE — 82570 ASSAY OF URINE CREATININE: CPT | Performed by: PHYSICIAN ASSISTANT

## 2023-04-06 PROCEDURE — 99207 PR FOOT EXAM NO CHARGE: CPT | Performed by: PHYSICIAN ASSISTANT

## 2023-04-06 PROCEDURE — 99214 OFFICE O/P EST MOD 30 MIN: CPT | Performed by: PHYSICIAN ASSISTANT

## 2023-04-06 PROCEDURE — 83036 HEMOGLOBIN GLYCOSYLATED A1C: CPT | Performed by: PHYSICIAN ASSISTANT

## 2023-04-06 PROCEDURE — 36415 COLL VENOUS BLD VENIPUNCTURE: CPT | Performed by: PHYSICIAN ASSISTANT

## 2023-04-06 RX ORDER — SIMVASTATIN 10 MG
10 TABLET ORAL AT BEDTIME
Qty: 90 TABLET | Refills: 2 | Status: SHIPPED | OUTPATIENT
Start: 2023-04-06 | End: 2023-11-01

## 2023-04-06 RX ORDER — LISINOPRIL/HYDROCHLOROTHIAZIDE 10-12.5 MG
1 TABLET ORAL DAILY
Qty: 90 TABLET | Refills: 1 | Status: SHIPPED | OUTPATIENT
Start: 2023-04-06 | End: 2023-11-01

## 2023-04-06 ASSESSMENT — PAIN SCALES - GENERAL: PAINLEVEL: MODERATE PAIN (5)

## 2023-04-06 NOTE — PROGRESS NOTES
"  Assessment & Plan     Type 2 diabetes mellitus without complication, without long-term current use of insulin (H)  Stable /Controlled  Labs- see orders/below.  Eye exam- abstracting- UTD  Foot exam- done today  Meds- reordered metformin same dose.   Exercise advised   - Lipid panel reflex to direct LDL Non-fasting; Future  - Albumin Random Urine Quantitative with Creat Ratio; Future  - HEMOGLOBIN A1C; Future  - FOOT EXAM  - Comprehensive metabolic panel (BMP + Alb, Alk Phos, ALT, AST, Total. Bili, TP); Future  - Lipid panel reflex to direct LDL Non-fasting  - Albumin Random Urine Quantitative with Creat Ratio  - HEMOGLOBIN A1C  - Comprehensive metabolic panel (BMP + Alb, Alk Phos, ALT, AST, Total. Bili, TP)    Benign essential hypertension  Stable, sx well controlled, tolerates medication(s) without side effects. Refilled x 6mo.  - lisinopril-hydrochlorothiazide (ZESTORETIC) 10-12.5 MG tablet; Take 1 tablet by mouth daily    Screen for colon cancer  Special screening for malignant neoplasms, colon  Due advised scheduling  - Colonoscopy Screening  Referral; Future    Ventral hernia without obstruction or gangrene  Ref to gen surgery   - Adult General Surg Referral; Future    Asymptomatic uncomplicated menopause  Discussed screening, wt bearing exercise, calcium intake. Mother osteopenia/osteoporosis   - DEXA HIP/PELVIS/SPINE - Future; Future    Endometrial carcinoma (H)  Due for pelvic exam fall 2023. No bleeding or concerns        BMI:   Estimated body mass index is 30.82 kg/m  as calculated from the following:    Height as of this encounter: 1.6 m (5' 3\").    Weight as of this encounter: 78.9 kg (174 lb).   Weight management plan: Discussed healthy diet and exercise guidelines    Follow Up: The patient was instructed to contact clinic for worsening symptoms, non-improvement in time frame as expected/discussed, and for questions regarding medications or treatment plan. For virtual visits, the patient was " advised to be seen for in person evaluation if symptoms or condition are worsening or non-improvement as expected.       ADARSH Gibson [facility] DOREEN Mario is a 63 year old, presenting for the following health issues:  Diabetes and Hernia        4/6/2023     7:41 AM   Additional Questions   Roomed by VE   Accompanied by SELF         4/6/2023     7:41 AM   Patient Reported Additional Medications   Patient reports taking the following new medications Calcium with magnesium and zinc     History of Present Illness       Diabetes:   She presents for follow up of diabetes.  She is checking home blood glucose two times daily. She checks blood glucose before meals.  Blood glucose is never over 200 and never under 70. When her blood glucose is low, the patient is asymptomatic for confusion, blurred vision, lethargy and reports not feeling dizzy, shaky, or weak.  She has no concerns regarding her diabetes at this time.  She is having numbness in feet and burning in feet. The patient has had a diabetic eye exam in the last 12 months. Eye exam performed on 03/15/2023. Location of last eye exam [facility].        Reason for visit:  Prescription refills, hernia advice    She eats 2-3 servings of fruits and vegetables daily.She consumes 1 sweetened beverage(s) daily.She exercises with enough effort to increase her heart rate 9 or less minutes per day.  She exercises with enough effort to increase her heart rate 3 or less days per week.   She is taking medications regularly.       Diabetes  Medications: metformin  Last a1c: 6.7%  Blood sugars: am fasting 140 and at night   (before dinner)   Hypoglycemia: no   Exercise: nothing now, walks in the summer.  Weight:  Down 3 lb/stable   ACEi/ARB: lisinopril  Statin: zocor  Aspirin: yes  Eye exam: eye exam - 03/15/2023  Foot concerns/exam: same hurt. Not numbness or tingling.     Still working.     Ventral hernia-told about it when she had  "hysterectomy. She was told to wait until it was painful.   It bothers her. It is protruding more. She does not notice redness/warmth.   No nausea, vomiting, bleeding w/BM.     [facility] - Eye Exam sent to abstraction  03/15/2023 - No Diabetic retinopathy was detected    S/p hysterectomy for endometrial cancer- 12/05/2017.   No radiation or chemotherapy   Denies vaginal bleeding, discharge, pelvic pain.  Last Gyn Onc visit- 09/09/2019- Michelle Clark NP- Annual pelvic exam advised (ok to do with primary care), surveillance paps NOT recommended.       Hyperlipidemia Follow-Up      Are you regularly taking any medication or supplement to lower your cholesterol?   Yes- simvastatin    Are you having muscle aches or other side effects that you think could be caused by your cholesterol lowering medication?  No    Hypertension Follow-up      Do you check your blood pressure regularly outside of the clinic? Yes     Are you following a low salt diet? Yes    Are your blood pressures ever more than 140 on the top number (systolic) OR more   than 90 on the bottom number (diastolic), for example 140/90? No        Review of Systems   Constitutional, HEENT, cardiovascular, pulmonary, gi and gu systems are negative, except as otherwise noted.      Objective    /60 (BP Location: Left arm, Patient Position: Chair, Cuff Size: Adult Regular)   Pulse 59   Temp 97.8  F (36.6  C) (Temporal)   Resp 17   Ht 1.6 m (5' 3\")   Wt 78.9 kg (174 lb)   LMP  (Exact Date)   SpO2 99%   Breastfeeding No   BMI 30.82 kg/m    Body mass index is 30.82 kg/m .  Physical Exam   GENERAL: healthy, alert and no distress  EYES: Eyes grossly normal to inspection, PERRL and conjunctivae and sclerae normal  HENT: ear canals and TM's normal, nose and mouth without ulcers or lesions  NECK: no adenopathy, no asymmetry, masses, or scars and thyroid normal to palpation  RESP: lungs clear to auscultation - no rales, rhonchi or wheezes  CV: " regular rate and rhythm, normal S1 S2, no S3 or S4, no murmur, click or rub, no peripheral edema and peripheral pulses strong  ABDOMEN: soft, midline soft large ventral hernia at surgical incision, nontender, and bowel sounds normal  MS: no gross musculoskeletal defects noted, no edema  NEURO: Normal strength and tone, mentation intact and speech normal  PSYCH: mentation appears normal, affect normal/bright  Diabetic foot exam: normal DP and PT pulses, no trophic changes or ulcerative lesions, normal sensory exam and normal monofilament exam, except for findings noted on diabetic foot graphical image:  RIght foot spots #5 and #6 to monofilament.             Results for orders placed or performed in visit on 04/06/23   Lipid panel reflex to direct LDL Non-fasting     Status: Abnormal   Result Value Ref Range    Cholesterol 105 <200 mg/dL    Triglycerides 147 <150 mg/dL    Direct Measure HDL 38 (L) >=50 mg/dL    LDL Cholesterol Calculated 38 <=100 mg/dL    Non HDL Cholesterol 67 <130 mg/dL    Patient Fasting > 8hrs? Yes     Narrative    Cholesterol  Desirable:  <200 mg/dL    Triglycerides  Normal:  Less than 150 mg/dL  Borderline High:  150-199 mg/dL  High:  200-499 mg/dL  Very High:  Greater than or equal to 500 mg/dL    Direct Measure HDL  Female:  Greater than or equal to 50 mg/dL   Male:  Greater than or equal to 40 mg/dL    LDL Cholesterol  Desirable:  <100mg/dL  Above Desirable:  100-129 mg/dL   Borderline High:  130-159 mg/dL   High:  160-189 mg/dL   Very High:  >= 190 mg/dL    Non HDL Cholesterol  Desirable:  130 mg/dL  Above Desirable:  130-159 mg/dL  Borderline High:  160-189 mg/dL  High:  190-219 mg/dL  Very High:  Greater than or equal to 220 mg/dL   HEMOGLOBIN A1C     Status: Abnormal   Result Value Ref Range    Hemoglobin A1C 6.7 (H) 0.0 - 5.6 %   Comprehensive metabolic panel (BMP + Alb, Alk Phos, ALT, AST, Total. Bili, TP)     Status: Abnormal   Result Value Ref Range    Sodium 141 133 - 144 mmol/L     Potassium 4.1 3.4 - 5.3 mmol/L    Chloride 108 94 - 109 mmol/L    Carbon Dioxide (CO2) 31 20 - 32 mmol/L    Anion Gap 2 (L) 3 - 14 mmol/L    Urea Nitrogen 37 (H) 7 - 30 mg/dL    Creatinine 0.98 0.52 - 1.04 mg/dL    Calcium 9.5 8.5 - 10.1 mg/dL    Glucose 138 (H) 70 - 99 mg/dL    Alkaline Phosphatase 62 40 - 150 U/L    AST 15 0 - 45 U/L    ALT 28 0 - 50 U/L    Protein Total 7.8 6.8 - 8.8 g/dL    Albumin 4.1 3.4 - 5.0 g/dL    Bilirubin Total 0.3 0.2 - 1.3 mg/dL    GFR Estimate 64 >60 mL/min/1.73m2

## 2023-04-06 NOTE — Clinical Note
Please abstract the following data from this visit with this patient into the appropriate field in Epic:  Tests that can be patient reported without a hard copy:  Eye exam with ophthalmology on this date: 03/15/2023 Exam Location: [facility]  Other Tests found in the patient's chart through Chart Review/Care Everywhere:  {Abstract Quality List (Optional):[id number]}  Note to Abstraction: If this section is blank, no results were found via Chart Review/Care Everywhere.

## 2023-04-06 NOTE — PATIENT INSTRUCTIONS
Arpitntral hernia-  general surgery in [place]     Plan for colonoscopy    To schedule your Imaging Test or Specialty Referral please call: DEXA bone density scan  [facility]  [address] [place] [address]  [phone]  Specialty consultation [phone]  Colonoscopy [phone]

## 2023-04-13 ENCOUNTER — OFFICE VISIT (OUTPATIENT)
Dept: SURGERY | Facility: CLINIC | Age: 63
End: 2023-04-13
Payer: COMMERCIAL

## 2023-04-13 ENCOUNTER — HOSPITAL ENCOUNTER (OUTPATIENT)
Dept: CT IMAGING | Facility: CLINIC | Age: 63
Discharge: HOME | End: 2023-04-13
Attending: SPECIALIST | Admitting: SPECIALIST
Payer: COMMERCIAL

## 2023-04-13 VITALS
TEMPERATURE: 96.7 F | SYSTOLIC BLOOD PRESSURE: 120 MMHG | HEIGHT: 63 IN | WEIGHT: 176.7 LBS | DIASTOLIC BLOOD PRESSURE: 60 MMHG | BODY MASS INDEX: 31.31 KG/M2

## 2023-04-13 DIAGNOSIS — K43.9 VENTRAL HERNIA WITHOUT OBSTRUCTION OR GANGRENE: ICD-10-CM

## 2023-04-13 DIAGNOSIS — K43.2 INCISIONAL HERNIA, WITHOUT OBSTRUCTION OR GANGRENE: ICD-10-CM

## 2023-04-13 DIAGNOSIS — K43.2 INCISIONAL HERNIA, WITHOUT OBSTRUCTION OR GANGRENE: Primary | ICD-10-CM

## 2023-04-13 PROCEDURE — 250N000011 HC RX IP 250 OP 636: Performed by: SPECIALIST

## 2023-04-13 PROCEDURE — 250N000009 HC RX 250: Performed by: SPECIALIST

## 2023-04-13 PROCEDURE — 74177 CT ABD & PELVIS W/CONTRAST: CPT

## 2023-04-13 PROCEDURE — 99204 OFFICE O/P NEW MOD 45 MIN: CPT | Performed by: SPECIALIST

## 2023-04-13 RX ORDER — IOPAMIDOL 755 MG/ML
500 INJECTION, SOLUTION INTRAVASCULAR ONCE
Status: COMPLETED | OUTPATIENT
Start: 2023-04-13 | End: 2023-04-13

## 2023-04-13 RX ADMIN — IOPAMIDOL 80 ML: 755 INJECTION, SOLUTION INTRAVENOUS at 14:10

## 2023-04-13 RX ADMIN — SODIUM CHLORIDE 60 ML: 9 INJECTION, SOLUTION INTRAVENOUS at 14:09

## 2023-04-13 ASSESSMENT — PAIN SCALES - GENERAL: PAINLEVEL: MILD PAIN (3)

## 2023-04-13 NOTE — LETTER
4/13/2023         RE: Shelbi Howard  [address] [facility] [address]        Dear Colleague,    Thank you for referring your patient, Shelbi Howard, to the [facility]. Please see a copy of my visit note below.    Consult requested by Tricia Baxter    Reason for consultation: Incisional hernia    HPI:  Patient is a 63-year-old white female (poor historian) who had some form of exploratory laparotomy many years ago in [place] for a GYN tumor.  She thinks it was uterine cancer.  Followed by a hysterectomy in 2015.  After that she developed an upper abdominal bulge around that time and was told she had a hernia..  She cannot recall exactly when.  She saw her PCP who diagnosed her with a ventral hernia for which she is now referred.  She is diabetic but she reports her A1c less than 7.  She denies receiving any radiation chemotherapy for her tumors.  She quit smoking about 10 years ago.  She reports some mild discomfort at the site but denies any nausea vomiting fevers chills or obstructive symptoms.  She has not had a recent CAT scan..  I am now asked to see her for an incisional hernia.    Past Medical History:   Diagnosis Date     ASCUS of cervix with negative high risk HPV 10/17/2017     Diabetes (H)     oral meds     Endometrial cancer (H)      HTN (hypertension)      Migraine      Obesity      Thyroid disease 7/2005    Graves     Past Surgical History:   Procedure Laterality Date     CYSTOSCOPY N/A 12/05/2017    Procedure: CYSTOSCOPY;;  Surgeon: Facundo Parekh MD;  Location: [facility] OR     DAVINCI HYSTERECTOMY TOTAL, BILATERAL SALPINGO-OOPHORECTOMY, NODE DISSECTION, COMBINED N/A 12/05/2017    Procedure: COMBINED DAVINCI HYSTERECTOMY TOTAL, SALPINGO-OOPHORECTOMY, NODE DISSECTION;  DaVinci Assisted Total Laparoscopic Hysterectomy, Right Salpingo-Oophorectomy, Lysis of Adhesions, Cystoscopy;  Surgeon: Facundo Parekh MD;  Location: [facility] OR     HYSTERECTOMY, PAP NO LONGER  INDICATED       LAPAROSCOPIC HYSTERECTOMY TOTAL  2017     oopherectomy Left     13 lb benign mass removal     Current Outpatient Medications   Medication     alcohol swab prep pads     aspirin 81 MG EC tablet     blood glucose (NO BRAND SPECIFIED) lancets standard     blood glucose (NO BRAND SPECIFIED) test strip     blood glucose monitoring (NO BRAND SPECIFIED) meter device kit     lisinopril-hydrochlorothiazide (ZESTORETIC) 10-12.5 MG tablet     metFORMIN (GLUCOPHAGE) 1000 MG tablet     simvastatin (ZOCOR) 10 MG tablet     No current facility-administered medications for this visit.        Allergies   Allergen Reactions     Latex Itching     Social History     Socioeconomic History     Marital status: Single     Spouse name: Not on file     Number of children: Not on file     Years of education: Not on file     Highest education level: Not on file   Occupational History     Not on file   Tobacco Use     Smoking status: Former     Packs/day: 0.50     Years: 38.00     Pack years: 19.00     Types: Cigarettes     Quit date: 2017     Years since quittin.1     Passive exposure: Never     Smokeless tobacco: Never     Tobacco comments:     smoked 30 years- half pack a day   Vaping Use     Vaping status: Never Used   Substance and Sexual Activity     Alcohol use: Yes     Comment: social- 1 drink once a week bowling     Drug use: No     Sexual activity: Not Currently   Other Topics Concern     Parent/sibling w/ CABG, MI or angioplasty before 65F 55M? No   Social History Narrative     Not on file     Social Determinants of Health     Financial Resource Strain: Not on file   Food Insecurity: Not on file   Transportation Needs: Not on file   Physical Activity: Not on file   Stress: Not on file   Social Connections: Not on file   Intimate Partner Violence: Not on file   Housing Stability: Not on file     Family History   Problem Relation Age of Onset     Diabetes Brother      Hypertension Mother      Hyperlipidemia  Mother      Cerebrovascular Disease Mother      Influenza/Pneumonia Father 62     Diabetes Father      Kidney Disease Sister      Diabetes Sister      Diabetes Brother       ROS: 10 point ROS neg other than the symptoms noted above in the HPI.    PE:  B/P: 120/60, T: 96.7, P: Data Unavailable, R: Data Unavailable  Body mass index is 31.3 kg/m .  General: well developed, well nourished WF who appears their stated age  HEENT: NC/AT, EOMI, (-)icterus, (-)injection  Neck: Supple, No JVD  Chest: CTA  Heart: S1, S2, (-)m/r/g  Abd: Soft, non tender, non distended, non tender, no masses, upper abdominal bulge with question defects versus loss of domain versus diastases recti  Ext; Warm, no edema  Psych: AAOx3  Neuro: No focal deficits    Lab Results   Component Value Date    A1C 6.7 04/06/2023    A1C 6.7 10/05/2022    A1C 6.8 03/16/2022    A1C 6.3 06/07/2021    A1C 6.5 09/10/2020    A1C 6.6 12/09/2019    A1C 6.7 06/28/2019    A1C 6.6 12/10/2018         Impression/plan:  This is 63-year-old lady with a probable incarcerated upper incisional hernia.  On exam I cannot determine the extent of the hernia and I feel there may be possible loss of domain.  I discussed these findings with the patient and she expressed limited understanding.  After discussion the patient lets time to CAT scan her abdomen pelvis and have her follow-up with me afterwards.    Anival Williamson MD, FACS      Again, thank you for allowing me to participate in the care of your patient.        Sincerely,        Anival Williamson MD

## 2023-04-13 NOTE — PROGRESS NOTES
Consult requested by Tricia Baxter    Reason for consultation: Incisional hernia    HPI:  Patient is a 63-year-old white female (poor historian) who had some form of exploratory laparotomy many years ago in [place] for a GYN tumor.  She thinks it was uterine cancer.  Followed by a hysterectomy in 2015.  After that she developed an upper abdominal bulge around that time and was told she had a hernia..  She cannot recall exactly when.  She saw her PCP who diagnosed her with a ventral hernia for which she is now referred.  She is diabetic but she reports her A1c less than 7.  She denies receiving any radiation chemotherapy for her tumors.  She quit smoking about 10 years ago.  She reports some mild discomfort at the site but denies any nausea vomiting fevers chills or obstructive symptoms.  She has not had a recent CAT scan..  I am now asked to see her for an incisional hernia.    Past Medical History:   Diagnosis Date     ASCUS of cervix with negative high risk HPV 10/17/2017     Diabetes (H)     oral meds     Endometrial cancer (H)      HTN (hypertension)      Migraine      Obesity      Thyroid disease 7/2005    Graves     Past Surgical History:   Procedure Laterality Date     CYSTOSCOPY N/A 12/05/2017    Procedure: CYSTOSCOPY;;  Surgeon: Facundo Parekh MD;  Location: [facility] OR     DAVINCI HYSTERECTOMY TOTAL, BILATERAL SALPINGO-OOPHORECTOMY, NODE DISSECTION, COMBINED N/A 12/05/2017    Procedure: COMBINED DAVINCI HYSTERECTOMY TOTAL, SALPINGO-OOPHORECTOMY, NODE DISSECTION;  DaVinci Assisted Total Laparoscopic Hysterectomy, Right Salpingo-Oophorectomy, Lysis of Adhesions, Cystoscopy;  Surgeon: Facundo Parekh MD;  Location: [facility] OR     HYSTERECTOMY, PAP NO LONGER INDICATED       LAPAROSCOPIC HYSTERECTOMY TOTAL  12/2017     oopherectomy Left     13 lb benign mass removal     Current Outpatient Medications   Medication     alcohol swab prep pads     aspirin 81 MG EC tablet     blood glucose (NO BRAND SPECIFIED)  lancets standard     blood glucose (NO BRAND SPECIFIED) test strip     blood glucose monitoring (NO BRAND SPECIFIED) meter device kit     lisinopril-hydrochlorothiazide (ZESTORETIC) 10-12.5 MG tablet     metFORMIN (GLUCOPHAGE) 1000 MG tablet     simvastatin (ZOCOR) 10 MG tablet     No current facility-administered medications for this visit.        Allergies   Allergen Reactions     Latex Itching     Social History     Socioeconomic History     Marital status: Single     Spouse name: Not on file     Number of children: Not on file     Years of education: Not on file     Highest education level: Not on file   Occupational History     Not on file   Tobacco Use     Smoking status: Former     Packs/day: 0.50     Years: 38.00     Pack years: 19.00     Types: Cigarettes     Quit date: 2017     Years since quittin.1     Passive exposure: Never     Smokeless tobacco: Never     Tobacco comments:     smoked 30 years- half pack a day   Vaping Use     Vaping status: Never Used   Substance and Sexual Activity     Alcohol use: Yes     Comment: social- 1 drink once a week bowling     Drug use: No     Sexual activity: Not Currently   Other Topics Concern     Parent/sibling w/ CABG, MI or angioplasty before 65F 55M? No   Social History Narrative     Not on file     Social Determinants of Health     Financial Resource Strain: Not on file   Food Insecurity: Not on file   Transportation Needs: Not on file   Physical Activity: Not on file   Stress: Not on file   Social Connections: Not on file   Intimate Partner Violence: Not on file   Housing Stability: Not on file     Family History   Problem Relation Age of Onset     Diabetes Brother      Hypertension Mother      Hyperlipidemia Mother      Cerebrovascular Disease Mother      Influenza/Pneumonia Father 62     Diabetes Father      Kidney Disease Sister      Diabetes Sister      Diabetes Brother       ROS: 10 point ROS neg other than the symptoms noted above in the  HPI.    PE:  B/P: 120/60, T: 96.7, P: Data Unavailable, R: Data Unavailable  Body mass index is 31.3 kg/m .  General: well developed, well nourished WF who appears their stated age  HEENT: NC/AT, EOMI, (-)icterus, (-)injection  Neck: Supple, No JVD  Chest: CTA  Heart: S1, S2, (-)m/r/g  Abd: Soft, non tender, non distended, non tender, no masses, upper abdominal bulge with question defects versus loss of domain versus diastases recti  Ext; Warm, no edema  Psych: AAOx3  Neuro: No focal deficits    Lab Results   Component Value Date    A1C 6.7 04/06/2023    A1C 6.7 10/05/2022    A1C 6.8 03/16/2022    A1C 6.3 06/07/2021    A1C 6.5 09/10/2020    A1C 6.6 12/09/2019    A1C 6.7 06/28/2019    A1C 6.6 12/10/2018         Impression/plan:  This is 63-year-old lady with a probable incarcerated upper incisional hernia.  On exam I cannot determine the extent of the hernia and I feel there may be possible loss of domain.  I discussed these findings with the patient and she expressed limited understanding.  After discussion the patient lets time to CAT scan her abdomen pelvis and have her follow-up with me afterwards.    Anival Williamson MD, FACS

## 2023-04-20 ENCOUNTER — OFFICE VISIT (OUTPATIENT)
Dept: SURGERY | Facility: CLINIC | Age: 63
End: 2023-04-20
Payer: COMMERCIAL

## 2023-04-20 ENCOUNTER — MYC MEDICAL ADVICE (OUTPATIENT)
Dept: FAMILY MEDICINE | Facility: CLINIC | Age: 63
End: 2023-04-20

## 2023-04-20 ENCOUNTER — TELEPHONE (OUTPATIENT)
Dept: SURGERY | Facility: CLINIC | Age: 63
End: 2023-04-20

## 2023-04-20 VITALS
TEMPERATURE: 98.5 F | HEIGHT: 63 IN | SYSTOLIC BLOOD PRESSURE: 130 MMHG | BODY MASS INDEX: 31.18 KG/M2 | DIASTOLIC BLOOD PRESSURE: 80 MMHG | WEIGHT: 176 LBS

## 2023-04-20 DIAGNOSIS — K43.2 INCISIONAL HERNIA, WITHOUT OBSTRUCTION OR GANGRENE: Primary | ICD-10-CM

## 2023-04-20 PROCEDURE — 99213 OFFICE O/P EST LOW 20 MIN: CPT | Performed by: SPECIALIST

## 2023-04-20 ASSESSMENT — PAIN SCALES - GENERAL: PAINLEVEL: MODERATE PAIN (4)

## 2023-04-20 NOTE — PROGRESS NOTES
Follow-up for CT scan    Subjective  Patient is a 63-year-old white female (poor historian) who had some form of exploratory laparotomy many years ago in [place] for a GYN tumor.  She thinks it was uterine cancer.  Followed by a hysterectomy in 2015.  After that she developed an upper abdominal bulge around that time and was told she had a hernia..  She cannot recall exactly when.  She saw her PCP who diagnosed her with a ventral hernia for which she is now referred.  She is diabetic but she reports her A1c less than 7.  She denies receiving any radiation chemotherapy for her tumors.  She quit smoking about 10 years ago.  She reports some mild discomfort at the site but denies any nausea vomiting fevers chills or obstructive symptoms.      She had a CT scan for which she now follows up.      PE:  B/P: 130/80, T: 98.5, P: Data Unavailable, R: Data Unavailable    Body mass index is 31.18 kg/m .  Abd: Soft, non tender, non distended, non tender, no masses, upper abdominal hernia that is partially reducible       Lab Results   Component Value Date    A1C 6.7 04/06/2023    A1C 6.7 10/05/2022    A1C 6.8 03/16/2022    A1C 6.3 06/07/2021    A1C 6.5 09/10/2020    A1C 6.6 12/09/2019    A1C 6.7 06/28/2019    A1C 6.6 12/10/2018     CT ABDOMEN AND PELVIS WITH CONTRAST 4/13/2023 2:18 PM     CLINICAL HISTORY: Endometrial cancer. Incisional hernia.  TECHNIQUE: CT scan of the abdomen and pelvis was performed following  injection of IV contrast. Multiplanar reformats were obtained. Dose  reduction techniques were used.  CONTRAST: ISOVUE-370, 80 mL  COMPARISON: None.     FINDINGS:   LOWER CHEST: The visualized lung bases are clear.     HEPATOBILIARY: Hepatic steatosis. No hepatic masses. No calcified  gallstones.     PANCREAS: Normal.     SPLEEN: Normal.     ADRENAL GLANDS: Normal.     KIDNEYS/BLADDER: Unremarkable. No hydronephrosis.     BOWEL: A prominent paraumbilical hernia contains multiple loops of  nondilated small bowel,  as well as a loop of nondilated transverse  colon. This hernia protrudes through an approximately 9.8 cm defect in  the anterior abdominal wall. No bowel obstruction. Colonic  diverticulosis, without evidence for diverticulitis. Unremarkable  appendix.     PELVIC ORGANS: Hysterectomy. No free fluid in the pelvis.     LYMPH NODES: No enlarged lymph nodes are identified in the abdomen or  pelvis.     VASCULATURE: Unremarkable.     ADDITIONAL FINDINGS: None.     MUSCULOSKELETAL: No destructive bony lesions. Degenerative changes in  the visualized thoracolumbar spine.                                                                      IMPRESSION:   1.  Prominent paraumbilical hernia contains multiple loops of  nondilated small bowel as well as a loop of nondilated transverse  colon.  2.  Hepatic steatosis.  3.  Colonic diverticulosis, without evidence for diverticulitis.     SYDNI GAMBOA MD          Assessment/plan:  This is 63-year-old lady with a probable incarcerated upper incisional hernia.  Review of her CAT scan does appear to be amenable to robotically assisted laparoscopic repair.  After discussion with the patient plan at this time is to proceed to robotically assisted laparoscopic repair with a possible open repair.   The procedure, risks, benefits, and alternatives were discussed and the patient agrees to proceed.  She will need a preoperative bowel prep to collapse the colon.  She will be scheduled the near future.      Anival Williamson MD, FACS

## 2023-04-20 NOTE — LETTER
4/20/2023         RE: Shelbi Howard  [address] [facility] [address]        Dear Colleague,    Thank you for referring your patient, Shelbi Howard, to the [facility]. Please see a copy of my visit note below.    Follow-up for CT scan    Subjective  Patient is a 63-year-old white female (poor historian) who had some form of exploratory laparotomy many years ago in [place] for a GYN tumor.  She thinks it was uterine cancer.  Followed by a hysterectomy in 2015.  After that she developed an upper abdominal bulge around that time and was told she had a hernia..  She cannot recall exactly when.  She saw her PCP who diagnosed her with a ventral hernia for which she is now referred.  She is diabetic but she reports her A1c less than 7.  She denies receiving any radiation chemotherapy for her tumors.  She quit smoking about 10 years ago.  She reports some mild discomfort at the site but denies any nausea vomiting fevers chills or obstructive symptoms.      She had a CT scan for which she now follows up.      PE:  B/P: 130/80, T: 98.5, P: Data Unavailable, R: Data Unavailable    Body mass index is 31.18 kg/m .  Abd: Soft, non tender, non distended, non tender, no masses, upper abdominal hernia that is partially reducible       Lab Results   Component Value Date    A1C 6.7 04/06/2023    A1C 6.7 10/05/2022    A1C 6.8 03/16/2022    A1C 6.3 06/07/2021    A1C 6.5 09/10/2020    A1C 6.6 12/09/2019    A1C 6.7 06/28/2019    A1C 6.6 12/10/2018     CT ABDOMEN AND PELVIS WITH CONTRAST 4/13/2023 2:18 PM     CLINICAL HISTORY: Endometrial cancer. Incisional hernia.  TECHNIQUE: CT scan of the abdomen and pelvis was performed following  injection of IV contrast. Multiplanar reformats were obtained. Dose  reduction techniques were used.  CONTRAST: ISOVUE-370, 80 mL  COMPARISON: None.     FINDINGS:   LOWER CHEST: The visualized lung bases are clear.     HEPATOBILIARY: Hepatic steatosis. No hepatic  masses. No calcified  gallstones.     PANCREAS: Normal.     SPLEEN: Normal.     ADRENAL GLANDS: Normal.     KIDNEYS/BLADDER: Unremarkable. No hydronephrosis.     BOWEL: A prominent paraumbilical hernia contains multiple loops of  nondilated small bowel, as well as a loop of nondilated transverse  colon. This hernia protrudes through an approximately 9.8 cm defect in  the anterior abdominal wall. No bowel obstruction. Colonic  diverticulosis, without evidence for diverticulitis. Unremarkable  appendix.     PELVIC ORGANS: Hysterectomy. No free fluid in the pelvis.     LYMPH NODES: No enlarged lymph nodes are identified in the abdomen or  pelvis.     VASCULATURE: Unremarkable.     ADDITIONAL FINDINGS: None.     MUSCULOSKELETAL: No destructive bony lesions. Degenerative changes in  the visualized thoracolumbar spine.                                                                      IMPRESSION:   1.  Prominent paraumbilical hernia contains multiple loops of  nondilated small bowel as well as a loop of nondilated transverse  colon.  2.  Hepatic steatosis.  3.  Colonic diverticulosis, without evidence for diverticulitis.     SYDNI GAMBOA MD          Assessment/plan:  This is 63-year-old lady with a probable incarcerated upper incisional hernia.  Review of her CAT scan does appear to be amenable to robotically assisted laparoscopic repair.  After discussion with the patient plan at this time is to proceed to robotically assisted laparoscopic repair with a possible open repair.   The procedure, risks, benefits, and alternatives were discussed and the patient agrees to proceed.  She will need a preoperative bowel prep to collapse the colon.  She will be scheduled the near future.      Anival Williamson MD, FACS      Again, thank you for allowing me to participate in the care of your patient.        Sincerely,        Anival Williamson MD

## 2023-04-20 NOTE — TELEPHONE ENCOUNTER
Type of surgery: HERNIORRHAPHY, INCISIONAL, ROBOT-ASSISTED, LAPAROSCOPIC, USING DA JUVENTINO XI (N/A)   HERNIORRHAPHY, INCISIONAL, OPEN    Location of surgery: [facility] OR  Date and time of surgery: 5/19  Surgeon: Teri  Pre-Op Appt Date: 5/17  Post-Op Appt Date: 6/1   Packet sent out: Yes  Pre-cert/Authorization completed:  Not Applicable  Date: na  miralax prep instructions sent via [employer]. Pt informed.

## 2023-04-21 ENCOUNTER — MYC MEDICAL ADVICE (OUTPATIENT)
Dept: SURGERY | Facility: CLINIC | Age: 63
End: 2023-04-21
Payer: COMMERCIAL

## 2023-04-24 NOTE — TELEPHONE ENCOUNTER
Per RK, he will be out of town after 5/19 and would like surgery rescheduled.     Spoke to patient, moved surgery to 5/24/2023.

## 2023-05-17 ENCOUNTER — OFFICE VISIT (OUTPATIENT)
Dept: FAMILY MEDICINE | Facility: CLINIC | Age: 63
End: 2023-05-17
Payer: COMMERCIAL

## 2023-05-17 ENCOUNTER — OFFICE VISIT (OUTPATIENT)
Dept: CARDIOLOGY | Facility: CLINIC | Age: 63
End: 2023-05-17
Payer: COMMERCIAL

## 2023-05-17 VITALS
SYSTOLIC BLOOD PRESSURE: 124 MMHG | OXYGEN SATURATION: 98 % | DIASTOLIC BLOOD PRESSURE: 56 MMHG | HEART RATE: 55 BPM | RESPIRATION RATE: 14 BRPM | HEIGHT: 63 IN | WEIGHT: 173.6 LBS | TEMPERATURE: 97.1 F | BODY MASS INDEX: 30.76 KG/M2

## 2023-05-17 VITALS
SYSTOLIC BLOOD PRESSURE: 150 MMHG | HEART RATE: 73 BPM | OXYGEN SATURATION: 98 % | DIASTOLIC BLOOD PRESSURE: 76 MMHG | BODY MASS INDEX: 31.18 KG/M2 | WEIGHT: 176 LBS

## 2023-05-17 DIAGNOSIS — K43.9 VENTRAL HERNIA WITHOUT OBSTRUCTION OR GANGRENE: ICD-10-CM

## 2023-05-17 DIAGNOSIS — E11.9 TYPE 2 DIABETES MELLITUS WITHOUT COMPLICATION, WITHOUT LONG-TERM CURRENT USE OF INSULIN (H): ICD-10-CM

## 2023-05-17 DIAGNOSIS — C54.1 ENDOMETRIAL CARCINOMA (H): ICD-10-CM

## 2023-05-17 DIAGNOSIS — R94.31 NONSPECIFIC ABNORMAL ELECTROCARDIOGRAM (ECG) (EKG): ICD-10-CM

## 2023-05-17 DIAGNOSIS — I10 BENIGN ESSENTIAL HYPERTENSION: ICD-10-CM

## 2023-05-17 DIAGNOSIS — Z86.39 HISTORY OF GRAVES' DISEASE: ICD-10-CM

## 2023-05-17 DIAGNOSIS — Z72.0 TOBACCO ABUSE: ICD-10-CM

## 2023-05-17 DIAGNOSIS — R07.89 ATYPICAL CHEST PAIN: Primary | ICD-10-CM

## 2023-05-17 DIAGNOSIS — Z01.818 PREOP GENERAL PHYSICAL EXAM: Primary | ICD-10-CM

## 2023-05-17 LAB
ANION GAP SERPL CALCULATED.3IONS-SCNC: 14 MMOL/L (ref 7–15)
BUN SERPL-MCNC: 26.2 MG/DL (ref 8–23)
CALCIUM SERPL-MCNC: 10.1 MG/DL (ref 8.8–10.2)
CHLORIDE SERPL-SCNC: 107 MMOL/L (ref 98–107)
CREAT SERPL-MCNC: 0.93 MG/DL (ref 0.51–0.95)
DEPRECATED HCO3 PLAS-SCNC: 22 MMOL/L (ref 22–29)
ERYTHROCYTE [DISTWIDTH] IN BLOOD BY AUTOMATED COUNT: 13 % (ref 10–15)
GFR SERPL CREATININE-BSD FRML MDRD: 68 ML/MIN/1.73M2
GLUCOSE SERPL-MCNC: 133 MG/DL (ref 70–99)
HBA1C MFR BLD: 6.5 % (ref 0–5.6)
HCT VFR BLD AUTO: 39.6 % (ref 35–47)
HGB BLD-MCNC: 13.3 G/DL (ref 11.7–15.7)
MCH RBC QN AUTO: 30.4 PG (ref 26.5–33)
MCHC RBC AUTO-ENTMCNC: 33.6 G/DL (ref 31.5–36.5)
MCV RBC AUTO: 90 FL (ref 78–100)
PLATELET # BLD AUTO: 235 10E3/UL (ref 150–450)
POTASSIUM SERPL-SCNC: 4.9 MMOL/L (ref 3.4–5.3)
RBC # BLD AUTO: 4.38 10E6/UL (ref 3.8–5.2)
SODIUM SERPL-SCNC: 143 MMOL/L (ref 136–145)
TSH SERPL DL<=0.005 MIU/L-ACNC: 0.69 UIU/ML (ref 0.3–4.2)
WBC # BLD AUTO: 6.8 10E3/UL (ref 4–11)

## 2023-05-17 PROCEDURE — 93000 ELECTROCARDIOGRAM COMPLETE: CPT | Performed by: PHYSICIAN ASSISTANT

## 2023-05-17 PROCEDURE — 85027 COMPLETE CBC AUTOMATED: CPT | Performed by: PHYSICIAN ASSISTANT

## 2023-05-17 PROCEDURE — 36415 COLL VENOUS BLD VENIPUNCTURE: CPT | Performed by: PHYSICIAN ASSISTANT

## 2023-05-17 PROCEDURE — 99205 OFFICE O/P NEW HI 60 MIN: CPT | Performed by: STUDENT IN AN ORGANIZED HEALTH CARE EDUCATION/TRAINING PROGRAM

## 2023-05-17 PROCEDURE — 83036 HEMOGLOBIN GLYCOSYLATED A1C: CPT | Performed by: PHYSICIAN ASSISTANT

## 2023-05-17 PROCEDURE — 99214 OFFICE O/P EST MOD 30 MIN: CPT | Performed by: PHYSICIAN ASSISTANT

## 2023-05-17 PROCEDURE — 84443 ASSAY THYROID STIM HORMONE: CPT | Performed by: PHYSICIAN ASSISTANT

## 2023-05-17 PROCEDURE — 80048 BASIC METABOLIC PNL TOTAL CA: CPT | Performed by: PHYSICIAN ASSISTANT

## 2023-05-17 ASSESSMENT — PAIN SCALES - GENERAL: PAINLEVEL: MILD PAIN (2)

## 2023-05-17 NOTE — PROGRESS NOTES
[facility] [place]  [address]  [place] [address]  Phone: [phone]  Fax: [phone]  Primary Provider: No Ref-Primary, Physician  Pre-op Performing Provider: JENI CHARLTON      PREOPERATIVE EVALUATION:  Today's date: 5/17/2023    Shelbi Howard is a 63 year old female who presents for a preoperative evaluation.      5/17/2023     7:30 AM   Additional Questions   Roomed by Antoni MICHAEL   Accompanied by n/a     Surgical Information:  Surgery/Procedure: HERNIORRHAPHY, INCISIONAL, ROBOT-ASSISTED, LAPAROSCOPIC, USING DA JUVENTINO XI. POSSIBLE HERNIORRHAPHY, INCISIONAL, OPEN  Surgery Location: [facility]  Surgeon: Anival Williamson MD  Surgery Date: 5/24/23  Time of Surgery: 7:30am- per records. But patient states she has not heard what time.   Where patient plans to recover: At home with family. Going to be going home as long as the surgery will be able to be done laparoscopically. Otherwise she will be staying over night at the hospital. When she goes home she will have a family member with her.   Fax number for surgical facility: Note does not need to be faxed, will be available electronically in Epic.    Assessment & Plan     The proposed surgical procedure is considered INTERMEDIATE risk.    Preop general physical exam  Ventral hernia without obstruction or gangrene  See below.   Chronic conditions are stable  Surgery as scheduled pending cardiology evaluation.   Labs ordered below or as indicated.   Reviewed preop info in AVS with pt including NPO, showering, medication recommendations, indications to delay/schedule (ie new illness s/sx). Pt questions answered.    - Basic metabolic panel  (Ca, Cl, CO2, Creat, Gluc, K, Na, BUN); Future  - CBC with platelets; Future  - EKG 12-lead complete w/read - Clinics  - CBC with platelets  - Basic metabolic panel  (Ca, Cl, CO2, Creat, Gluc, K, Na, BUN)    Nonspecific abnormal electrocardiogram (ECG)  (EKG)  She has type 2 diabetes and smokes tobacco intermittently- EKG obtained.   Abnormal EKG : sinus bradycardia, 1st degree AV block, inverted T-waves in both lateral and inferior leads, V1/V2 -q wave vs poor r wave progression, there are no prior tracings available  Pt has never had an EKG or cardiovascular evaluation.   Surgery is elective.   Will ref to cardiology prior to surgery. She is aware may need to move surgery out pending schedules for consult/testing   - Adult Cardiology Eval  Referral; Future    Type 2 diabetes mellitus without complication, without long-term current use of insulin (H)  a1c is <7% and has been stable  - Hemoglobin A1c; Future  - Adult Cardiology Eval  Referral; Future  - Hemoglobin A1c    Tobacco abuse  Smoking when stressed.   Advised absolute cessation for overall health benfit. Discussed impact of smoking on healing postop    Endometrial carcinoma (H)- hyst 12/2017  Due for pelvic exam fall 2023. No bleeding or concerns    Benign essential hypertension  Has been controlled on current medications     History of Graves' disease  Has been a few years since TSH checked. Obtained and normal.   - TSH with free T4 reflex; Future  - TSH with free T4 reflex         - No identified additional risk factors other than previously addressed    Antiplatelet or Anticoagulation Medication Instructions:   - aspirin: Discontinue aspirin 7-10 days prior to procedure to reduce bleeding risk. It should be resumed postoperatively.     Additional Medication Instructions:  Patient is to take all scheduled medications on the day of surgery EXCEPT for modifications listed below:   - ACE/ARB: HOLD on day of surgery (minimum 11 hours for general anesthesia).   - Diuretics: HOLD on the day of surgery.   - metformin: HOLD day of surgery.    RECOMMENDATION:  Patient referred to cardiology for evaluation before surgery. Surgery approval pending completion of consultation.    Tricia Baxter,  "PA-C      Subjective       HPI related to upcoming procedure: ventral hernia          5/10/2023     9:19 AM   Preop Questions   1. Have you ever had a heart attack or stroke? No   2. Have you ever had surgery on your heart or blood vessels, such as a stent placement, a coronary artery bypass, or surgery on an artery in your head, neck, heart, or legs? No   3. Do you have chest pain with activity? No   4. Do you have a history of  heart failure? No   5. Do you currently have a cold, bronchitis or symptoms of other infection? No   6. Do you have a cough, shortness of breath, or wheezing? No   7. Do you or anyone in your family have previous history of blood clots? No   8. Do you or does anyone in your family have a serious bleeding problem such as prolonged bleeding following surgeries or cuts? No   9. Have you ever had problems with anemia or been told to take iron pills? No   10. Have you had any abnormal blood loss such as black, tarry or bloody stools, or abnormal vaginal bleeding? YES - postmenopausal uterine bleeding, found to be endometrial cancer, and is s/p hysterectomy 2017. No abnormal bleeding since.   11. Have you ever had a blood transfusion? UNKNOWN - \"I don't think so\"   12. Are you willing to have a blood transfusion if it is medically needed before, during, or after your surgery? Yes   13. Have you or any of your relatives ever had problems with anesthesia? No   14. Do you have sleep apnea, excessive snoring or daytime drowsiness? No   15. Do you have any artifical heart valves or other implanted medical devices like a pacemaker, defibrillator, or continuous glucose monitor? No   16. Do you have artificial joints? No   17. Are you allergic to latex? YES- hands are itchy when wearing latex gloves. No wheeze, anaphylaxis.        Health Care Directive:  Patient does not have a Health Care Directive or Living Will: Discussed advance care planning with patient; however, patient declined at this " "time.    Preoperative Review of :   reviewed - no record of controlled substances prescribed.    For exercise: started walking in spring weather- a mile- 3d per week. Feels good with exercise. Limitations with exercise due to: nothing. Denies CV sxs with exercise including CP, SOB, palpitations, STRINGER, presyncope.    Tobacco smoker - 1 cig per month. \"When I am nervous\".     Status of Chronic Conditions:  DIABETES - Patient has a longstanding history of DiabetesType Type II . Patient is being treated with oral agents and denies significant side effects. Control has been good. Complicating factors include but are not limited to: hypertension.       Review of Systems  CONSTITUTIONAL: NEGATIVE for fever, chills, change in weight  INTEGUMENTARY/SKIN: NEGATIVE for worrisome rashes, moles or lesions  EYES: NEGATIVE for vision changes or irritation  ENT/MOUTH: NEGATIVE for ear, mouth and throat problems  RESP: NEGATIVE for significant cough or SOB  CV: NEGATIVE for chest pain, palpitations or peripheral edema  GI: NEGATIVE for nausea, abdominal pain, heartburn, or change in bowel habits  : NEGATIVE for frequency, dysuria, or hematuria  MUSCULOSKELETAL: NEGATIVE for significant arthralgias or myalgia  NEURO: NEGATIVE for weakness, dizziness or paresthesias  ENDOCRINE: NEGATIVE for temperature intolerance, skin/hair changes  HEME: NEGATIVE for bleeding problems  PSYCHIATRIC: NEGATIVE for changes in mood or affect    Patient Active Problem List    Diagnosis Date Noted     Type 2 diabetes mellitus without complication, without long-term current use of insulin (H) 12/08/2017     Priority: Medium     S/P hysterectomy 12/05/2017     Priority: Medium     Endometrial carcinoma (H)- hyst 12/2017 11/03/2017     Priority: Medium     No radiation or chemotherapy   Gyn/Onc visit- 09/09/2019- Michelle Clark NP- Annual pelvic exam advised (ok to do with primary care), surveillance paps NOT recommended.        ASCUS of cervix with " negative high risk HPV 10/17/2017     Priority: Medium     10/17/17 ASCUS pap, neg HPV. EMB: FIGO grade 1 endometrioid adenocarcinoma.  Gyn Oncology [facility] for 11/20 @ 3:40pm   3/20/18 Gyn Onc visit with Dr. Clark        Past Medical History:   Diagnosis Date     ASCUS of cervix with negative high risk HPV 10/17/2017     Diabetes (H)     oral meds     Endometrial cancer (H)      HTN (hypertension)      Migraine      Obesity      Thyroid disease 7/2005    Graves     Past Surgical History:   Procedure Laterality Date     CYSTOSCOPY N/A 12/05/2017    Procedure: CYSTOSCOPY;;  Surgeon: Facundo Parekh MD;  Location: [facility] OR     DAVINCI HYSTERECTOMY TOTAL, BILATERAL SALPINGO-OOPHORECTOMY, NODE DISSECTION, COMBINED N/A 12/05/2017    Procedure: COMBINED DAVINCI HYSTERECTOMY TOTAL, SALPINGO-OOPHORECTOMY, NODE DISSECTION;  DaVinci Assisted Total Laparoscopic Hysterectomy, Right Salpingo-Oophorectomy, Lysis of Adhesions, Cystoscopy;  Surgeon: Facundo Parekh MD;  Location: [facility] OR     HYSTERECTOMY, PAP NO LONGER INDICATED       LAPAROSCOPIC HYSTERECTOMY TOTAL  12/2017     oopherectomy Left     13 lb benign mass removal     Current Outpatient Medications   Medication Sig Dispense Refill     alcohol swab prep pads Use to swab area of injection/jennifer as directed. 100 each 0     aspirin 81 MG EC tablet Take 1 tablet (81 mg) by mouth daily 90 tablet 3     blood glucose (NO BRAND SPECIFIED) lancets standard Use to test blood sugar 1 times daily or as directed. 100 lancet. 5     blood glucose (NO BRAND SPECIFIED) test strip Use to test blood sugar 1 times daily or as directed. 100 strip 5     blood glucose monitoring (NO BRAND SPECIFIED) meter device kit Use to test blood sugar 1 times daily or as directed. 1 kit 0     lisinopril-hydrochlorothiazide (ZESTORETIC) 10-12.5 MG tablet Take 1 tablet by mouth daily 90 tablet 1     metFORMIN (GLUCOPHAGE) 1000 MG tablet Take 1 tablet (1,000 mg) by mouth 2 times daily (with meals) 180  "tablet 1     simvastatin (ZOCOR) 10 MG tablet Take 1 tablet (10 mg) by mouth At Bedtime 90 tablet 2       Allergies   Allergen Reactions     Latex Itching        Social History     Tobacco Use     Smoking status: Former     Packs/day: 0.50     Years: 38.00     Pack years: 19.00     Types: Cigarettes     Quit date: 2017     Years since quittin.2     Passive exposure: Never     Smokeless tobacco: Never     Tobacco comments:     smoked 30 years- half pack a day   Vaping Use     Vaping status: Never Used   Substance Use Topics     Alcohol use: Yes     Comment: social- 1 drink once a week bowling     Family History   Problem Relation Age of Onset     Diabetes Brother      Hypertension Mother      Hyperlipidemia Mother      Cerebrovascular Disease Mother      Influenza/Pneumonia Father 62     Diabetes Father      Kidney Disease Sister      Diabetes Sister      Depression Sister      Diabetes Brother      Colon Cancer Brother      History   Drug Use No         Objective     /56   Pulse 55   Temp 97.1  F (36.2  C) (Temporal)   Resp 14   Ht 1.6 m (5' 3\")   Wt 78.7 kg (173 lb 9.6 oz)   SpO2 98%   BMI 30.75 kg/m      Physical Exam    GENERAL APPEARANCE: healthy, alert and no distress     EYES: EOMI, PERRL     HENT: ear canals and TM's normal and nose and mouth without ulcers or lesions     NECK: no adenopathy, no asymmetry, masses, or scars and thyroid normal to palpation     RESP: lungs clear to auscultation - no rales, rhonchi or wheezes     CV: regular rates and rhythm, normal S1 S2, no S3 or S4 and no murmur, click or rub     ABDOMEN:  soft, nontender, + protrusion of hernia, nontender, and bowel sounds normal     MS: extremities normal- no gross deformities noted, no evidence of inflammation in joints, FROM in all extremities.     SKIN: no suspicious lesions or rashes     NEURO: Normal strength and tone, sensory exam grossly normal, mentation intact and speech normal     PSYCH: mentation appears " normal. and affect normal/bright     LYMPHATICS: No cervical adenopathy    Recent Labs   Lab Test 04/06/23  0830 10/05/22  0746 03/16/22  1023     --  139   POTASSIUM 4.1  --  4.6   CR 0.98  --  0.91   A1C 6.7* 6.7* 6.8*        Diagnostics:  Recent Results (from the past 168 hour(s))   Hemoglobin A1c    Collection Time: 05/17/23  9:04 AM   Result Value Ref Range    Hemoglobin A1C 6.5 (H) 0.0 - 5.6 %   CBC with platelets    Collection Time: 05/17/23  9:04 AM   Result Value Ref Range    WBC Count 6.8 4.0 - 11.0 10e3/uL    RBC Count 4.38 3.80 - 5.20 10e6/uL    Hemoglobin 13.3 11.7 - 15.7 g/dL    Hematocrit 39.6 35.0 - 47.0 %    MCV 90 78 - 100 fL    MCH 30.4 26.5 - 33.0 pg    MCHC 33.6 31.5 - 36.5 g/dL    RDW 13.0 10.0 - 15.0 %    Platelet Count 235 150 - 450 10e3/uL   Basic metabolic panel  (Ca, Cl, CO2, Creat, Gluc, K, Na, BUN)    Collection Time: 05/17/23  9:04 AM   Result Value Ref Range    Sodium 143 136 - 145 mmol/L    Potassium 4.9 3.4 - 5.3 mmol/L    Chloride 107 98 - 107 mmol/L    Carbon Dioxide (CO2) 22 22 - 29 mmol/L    Anion Gap 14 7 - 15 mmol/L    Urea Nitrogen 26.2 (H) 8.0 - 23.0 mg/dL    Creatinine 0.93 0.51 - 0.95 mg/dL    Calcium 10.1 8.8 - 10.2 mg/dL    Glucose 133 (H) 70 - 99 mg/dL    GFR Estimate 68 >60 mL/min/1.73m2   TSH with free T4 reflex    Collection Time: 05/17/23  9:04 AM   Result Value Ref Range    TSH 0.69 0.30 - 4.20 uIU/mL      EKG: sinus bradycardia, 1st degree AV block, inverted T-waves in both lateral and inferior leads, V1/V2 q wave vs poor r wave progression, there are no prior tracings available, no prior EKG    Revised Cardiac Risk Index (RCRI):  The patient has the following serious cardiovascular risks for perioperative complications:   - No serious cardiac risks = 0 points     RCRI Interpretation: 0 points: Class I (very low risk - 0.4% complication rate)           Signed Electronically by: Tricia Baxter PA-C  Copy of this evaluation report is provided to requesting  physician.

## 2023-05-17 NOTE — PROGRESS NOTES
Chief complaint: pre operative evaluation    HPI:   Shelbi Howard is a 63 year old female with history of diabetes who presents for evaluation prior to hernia repair surgery.     Patient reports that is doing well overall, she is able to walk a couple blocks without limitation, denies chest pain or shortness of breath. She is physically active at her job without limitations.  She does have atypical chest discomfort.     PAST MEDICAL HISTORY:  Past Medical History:   Diagnosis Date     ASCUS of cervix with negative high risk HPV 10/17/2017     Diabetes (H)     oral meds     Endometrial cancer (H)      HTN (hypertension)      Migraine      Obesity      Thyroid disease 7/2005    Graves       CURRENT MEDICATIONS:  Current Outpatient Medications   Medication Sig Dispense Refill     alcohol swab prep pads Use to swab area of injection/jennifer as directed. 100 each 0     aspirin 81 MG EC tablet Take 1 tablet (81 mg) by mouth daily 90 tablet 3     blood glucose (NO BRAND SPECIFIED) lancets standard Use to test blood sugar 1 times daily or as directed. 100 lancet. 5     blood glucose (NO BRAND SPECIFIED) test strip Use to test blood sugar 1 times daily or as directed. 100 strip 5     blood glucose monitoring (NO BRAND SPECIFIED) meter device kit Use to test blood sugar 1 times daily or as directed. 1 kit 0     lisinopril-hydrochlorothiazide (ZESTORETIC) 10-12.5 MG tablet Take 1 tablet by mouth daily 90 tablet 1     metFORMIN (GLUCOPHAGE) 1000 MG tablet Take 1 tablet (1,000 mg) by mouth 2 times daily (with meals) 180 tablet 1     simvastatin (ZOCOR) 10 MG tablet Take 1 tablet (10 mg) by mouth At Bedtime 90 tablet 2       PAST SURGICAL HISTORY:  Past Surgical History:   Procedure Laterality Date     ABDOMEN SURGERY       CYSTOSCOPY N/A 12/05/2017    Procedure: CYSTOSCOPY;;  Surgeon: Facundo Parekh MD;  Location: [facility] OR     DAVINCI HYSTERECTOMY TOTAL, BILATERAL SALPINGO-OOPHORECTOMY, NODE DISSECTION, COMBINED N/A  2017    Procedure: COMBINED DAVINCI HYSTERECTOMY TOTAL, SALPINGO-OOPHORECTOMY, NODE DISSECTION;  DaVinci Assisted Total Laparoscopic Hysterectomy, Right Salpingo-Oophorectomy, Lysis of Adhesions, Cystoscopy;  Surgeon: Facundo Parekh MD;  Location: [facility] OR     HYSTERECTOMY, PAP NO LONGER INDICATED       LAPAROSCOPIC HYSTERECTOMY TOTAL  2017     oopherectomy Left     13 lb benign mass removal     ORTHOPEDIC SURGERY         ALLERGIES:     Allergies   Allergen Reactions     Latex Itching       FAMILY HISTORY:  Family History   Problem Relation Age of Onset     Diabetes Brother      Hypertension Mother      Hyperlipidemia Mother      Cerebrovascular Disease Mother      Influenza/Pneumonia Father 62     Diabetes Father      Kidney Disease Sister      Diabetes Sister      Depression Sister      Diabetes Brother      Colon Cancer Brother        SOCIAL HISTORY:  Social History     Tobacco Use     Smoking status: Some Days     Packs/day: 0.00     Years: 38.00     Pack years: 0.00     Types: Cigarettes     Last attempt to quit: 2017     Years since quittin.2     Passive exposure: Never     Smokeless tobacco: Never     Tobacco comments:     smoked 30 years- half pack a day. States that she does not smoke very often anymore (21)- 1 cig per month    Vaping Use     Vaping status: Never Used   Substance Use Topics     Alcohol use: Yes     Comment: social     Drug use: No       ROS:   A comprehensive 14 point review of systems is negative other than as mentioned in HPI.    Exam:  BP (!) 150/76 (BP Location: Left arm, Patient Position: Chair, Cuff Size: Adult Regular)   Pulse 73   Wt 79.8 kg (176 lb)   SpO2 98%   BMI 31.18 kg/m    GENERAL APPEARANCE: healthy, alert and no distress  EYES: no icterus, no xanthelasmas  NECK: JVP not elevated  RESPIRATORY: lungs clear to auscultation - no rales, rhonchi or wheezes, no use of accessory muscles, no retractions, respirations are unlabored, normal respiratory  rate  CARDIOVASCULAR: regular rhythm, normal S1 with physiologic split S2, no S3 or S4 and no murmur, click or rub.  EXTREMITIES: no edema, no bruits    Labs:  Reviewed.   LDL Cholesterol Calculated   Date Value Ref Range Status   04/06/2023 38 <=100 mg/dL Final   12/09/2020 49 <100 mg/dL Final     Comment:     Desirable:       <100 mg/dl       Testing/Procedures:    ECG today showed sinus bradycardia, first degree AV block.     Assessment and Plan:   1. Hernia repair, abdominal, is a non-emergent procedure that is of intermediate-risk [intraperitoneal and intrathoracic, carotid endarterectomy, head and neck surgery, orthopedic surgery, and prostate surgery], based on ACC/AHA guidelines.    2. The patient currently has no active cardiac conditions [unstable coronary syndromes, decompensated HF, significant arrhythmias, or severe valvular disease (severe AS or symptomatic MS)].    3. Functional METs is 4 greater than or equal to 4 METs without symptoms. The patient's RCRI score is one. Based on RCRI score, the risk of major cardiac risk complications are:  One risk factor - 1.0 percent (95% CI 0.5-1.4 percent)    4. Continue beta-blockers and antiarrhythmics, but hold diuretics and ACE-I on morning of surgery.    5. The patient has risk factors including diabetes, so will proceed with stress echocardiogram, if no evidence of ischemia she is at acceptable cardiac risk to proceed with the surgery.    Atypical chest pain - Stress echocardiogram.     Chart review time: 30 minutes  Visit time: 30 minutes  Total time spent: 60 minutes  >50% of this 30-minute visit were spent with the patient on in-person counseling and discussion regarding risk stratification.     The patient states understanding and is agreeable with plan.     Omar Garrido MD  Cardiology    CC  JENI [place]TON

## 2023-05-17 NOTE — NURSING NOTE
Shelbi Howard's goals for this visit include:   Chief Complaint   Patient presents with     New Patient     Referred by Tricia Baxter  Cardiac Clearance for Herniorrhaphy 05/24/23       She requests these members of her care team be copied on today's visit information: yes     PCP: No Ref-Primary, Physician    Referring Provider:  Tricia Baxter PA-C  [address] [place] [address]    BP (!) 150/76 (BP Location: Left arm, Patient Position: Chair, Cuff Size: Adult Regular)   Pulse 73   Wt 79.8 kg (176 lb)   SpO2 98%   BMI 31.18 kg/m      Do you need any medication refills at today's visit? No       DELTA [place]   Cardiology Team  [facility]

## 2023-05-17 NOTE — PATIENT INSTRUCTIONS
For informational purposes only. Not to replace the advice of your health care provider. Copyright   2003,  [facility]. All rights reserved. Clinically reviewed by France Logan MD. [employer] [id number] - REV .  Preparing for Your Surgery  Getting started  A nurse will call you to review your health history and instructions. They will give you an arrival time based on your scheduled surgery time. Please be ready to share:  Your doctor's clinic name and phone number  Your medical, surgical, and anesthesia history  A list of allergies and sensitivities  A list of medicines, including herbal treatments and over-the-counter drugs  Whether the patient has a legal guardian (ask how to send us the papers in advance)  Please tell us if you're pregnant--or if there's any chance you might be pregnant. Some surgeries may injure a fetus (unborn baby), so they require a pregnancy test. Surgeries that are safe for a fetus don't always need a test, and you can choose whether to have one.   If you have a child who's having surgery, please ask for a copy of Preparing for Your Child's Surgery.    Preparing for surgery  Within 10 to 30 days of surgery: Have a pre-op exam (sometimes called an H&P, or History and Physical). This can be done at a clinic or pre-operative center.  If you're having a , you may not need this exam. Talk to your care team.  At your pre-op exam, talk to your care team about all medicines you take. If you need to stop any medicines before surgery, ask when to start taking them again.  We do this for your safety. Many medicines can make you bleed too much during surgery. Some change how well surgery (anesthesia) drugs work.  Call your insurance company to let them know you're having surgery. (If you don't have insurance, call [phone].)  Call your clinic if there's any change in your health. This includes signs of a cold or flu (sore throat, runny nose, cough, rash, fever). It  also includes a scrape or scratch near the surgery site.  If you have questions on the day of surgery, call your hospital or surgery center.  Eating and drinking guidelines  For your safety: Unless your surgeon tells you otherwise, follow the guidelines below.  Eat and drink as usual until 8 hours before you arrive for surgery. After that, no food or milk.  Drink clear liquids until 2 hours before you arrive. These are liquids you can see through, like water, Gatorade, and Propel Water. They also include plain black coffee and tea (no cream or milk), candy, and breath mints. You can spit out gum when you arrive.  If you drink alcohol: Stop drinking it the night before surgery.  If your care team tells you to take medicine on the morning of surgery, it's okay to take it with a sip of water.  Preventing infection  Shower or bathe the night before and morning of your surgery. Follow the instructions your clinic gave you. (If no instructions, use regular soap.)  Don't shave or clip hair near your surgery site. We'll remove the hair if needed.  Don't smoke or vape the morning of surgery. You may chew nicotine gum up to 2 hours before surgery. A nicotine patch is okay.  Note: Some surgeries require you to completely quit smoking and nicotine. Check with your surgeon.  Your care team will make every effort to keep you safe from infection. We will:  Clean our hands often with soap and water (or an alcohol-based hand rub).  Clean the skin at your surgery site with a special soap that kills germs.  Give you a special gown to keep you warm. (Cold raises the risk of infection.)  Wear special hair covers, masks, gowns and gloves during surgery.  Give antibiotic medicine, if prescribed. Not all surgeries need antibiotics.  What to bring on the day of surgery  Photo ID and insurance card  Copy of your health care directive, if you have one  Glasses and hearing aids (bring cases)  You can't wear contacts during surgery  Inhaler and  eye drops, if you use them (tell us about these when you arrive)  CPAP machine or breathing device, if you use them  A few personal items, if spending the night  If you have . . .  A pacemaker, ICD (cardiac defibrillator) or other implant: Bring the ID card.  An implanted stimulator: Bring the remote control.  A legal guardian: Bring a copy of the certified (court-stamped) guardianship papers.  Please remove any jewelry, including body piercings. Leave jewelry and other valuables at home.  If you're going home the day of surgery  You must have a responsible adult drive you home. They should stay with you overnight as well.  If you don't have someone to stay with you, and you aren't safe to go home alone, we may keep you overnight. Insurance often won't pay for this.  After surgery  If it's hard to control your pain or you need more pain medicine, please call your surgeon's office.  Questions?   If you have any questions for your care team, list them here: _________________________________________________________________________________________________________________________________________________________________________ ____________________________________ ____________________________________ ____________________________________    How to Take Your Medication Before Surgery  - HOLD (do not take) your HYDROCHLOROTHIAZIDE on the morning of surgery.   - HOLD (do not take) your METFORMIN on the morning of surgery.  - HOLD (do not take) Aspirin for 7 days  - HOLD (do not take) lisinopril morning of surgery

## 2023-05-17 NOTE — H&P (VIEW-ONLY)
[facility] [place]  [address]  [place] [address]  Phone: [phone]  Fax: [phone]  Primary Provider: No Ref-Primary, Physician  Pre-op Performing Provider: JENI CHARLTON      PREOPERATIVE EVALUATION:  Today's date: 5/17/2023    Shelbi Howard is a 63 year old female who presents for a preoperative evaluation.      5/17/2023     7:30 AM   Additional Questions   Roomed by Antoni MICHAEL   Accompanied by n/a     Surgical Information:  Surgery/Procedure: HERNIORRHAPHY, INCISIONAL, ROBOT-ASSISTED, LAPAROSCOPIC, USING DA JUVENTINO XI. POSSIBLE HERNIORRHAPHY, INCISIONAL, OPEN  Surgery Location: [facility]  Surgeon: Anival Williamson MD  Surgery Date: 5/24/23  Time of Surgery: 7:30am- per records. But patient states she has not heard what time.   Where patient plans to recover: At home with family. Going to be going home as long as the surgery will be able to be done laparoscopically. Otherwise she will be staying over night at the hospital. When she goes home she will have a family member with her.   Fax number for surgical facility: Note does not need to be faxed, will be available electronically in Epic.    Assessment & Plan     The proposed surgical procedure is considered INTERMEDIATE risk.    Preop general physical exam  Ventral hernia without obstruction or gangrene  See below.   Chronic conditions are stable  Surgery as scheduled pending cardiology evaluation.   Labs ordered below or as indicated.   Reviewed preop info in AVS with pt including NPO, showering, medication recommendations, indications to delay/schedule (ie new illness s/sx). Pt questions answered.    - Basic metabolic panel  (Ca, Cl, CO2, Creat, Gluc, K, Na, BUN); Future  - CBC with platelets; Future  - EKG 12-lead complete w/read - Clinics  - CBC with platelets  - Basic metabolic panel  (Ca, Cl, CO2, Creat, Gluc, K, Na, BUN)    Nonspecific abnormal electrocardiogram (ECG)  (EKG)  She has type 2 diabetes and smokes tobacco intermittently- EKG obtained.   Abnormal EKG : sinus bradycardia, 1st degree AV block, inverted T-waves in both lateral and inferior leads, V1/V2 -q wave vs poor r wave progression, there are no prior tracings available  Pt has never had an EKG or cardiovascular evaluation.   Surgery is elective.   Will ref to cardiology prior to surgery. She is aware may need to move surgery out pending schedules for consult/testing   - Adult Cardiology Eval  Referral; Future    Type 2 diabetes mellitus without complication, without long-term current use of insulin (H)  a1c is <7% and has been stable  - Hemoglobin A1c; Future  - Adult Cardiology Eval  Referral; Future  - Hemoglobin A1c    Tobacco abuse  Smoking when stressed.   Advised absolute cessation for overall health benfit. Discussed impact of smoking on healing postop    Endometrial carcinoma (H)- hyst 12/2017  Due for pelvic exam fall 2023. No bleeding or concerns    Benign essential hypertension  Has been controlled on current medications     History of Graves' disease  Has been a few years since TSH checked. Obtained and normal.   - TSH with free T4 reflex; Future  - TSH with free T4 reflex         - No identified additional risk factors other than previously addressed    Antiplatelet or Anticoagulation Medication Instructions:   - aspirin: Discontinue aspirin 7-10 days prior to procedure to reduce bleeding risk. It should be resumed postoperatively.     Additional Medication Instructions:  Patient is to take all scheduled medications on the day of surgery EXCEPT for modifications listed below:   - ACE/ARB: HOLD on day of surgery (minimum 11 hours for general anesthesia).   - Diuretics: HOLD on the day of surgery.   - metformin: HOLD day of surgery.    RECOMMENDATION:  Patient referred to cardiology for evaluation before surgery. Surgery approval pending completion of consultation.    Tricia Baxter,  "PA-C      Subjective       HPI related to upcoming procedure: ventral hernia          5/10/2023     9:19 AM   Preop Questions   1. Have you ever had a heart attack or stroke? No   2. Have you ever had surgery on your heart or blood vessels, such as a stent placement, a coronary artery bypass, or surgery on an artery in your head, neck, heart, or legs? No   3. Do you have chest pain with activity? No   4. Do you have a history of  heart failure? No   5. Do you currently have a cold, bronchitis or symptoms of other infection? No   6. Do you have a cough, shortness of breath, or wheezing? No   7. Do you or anyone in your family have previous history of blood clots? No   8. Do you or does anyone in your family have a serious bleeding problem such as prolonged bleeding following surgeries or cuts? No   9. Have you ever had problems with anemia or been told to take iron pills? No   10. Have you had any abnormal blood loss such as black, tarry or bloody stools, or abnormal vaginal bleeding? YES - postmenopausal uterine bleeding, found to be endometrial cancer, and is s/p hysterectomy 2017. No abnormal bleeding since.   11. Have you ever had a blood transfusion? UNKNOWN - \"I don't think so\"   12. Are you willing to have a blood transfusion if it is medically needed before, during, or after your surgery? Yes   13. Have you or any of your relatives ever had problems with anesthesia? No   14. Do you have sleep apnea, excessive snoring or daytime drowsiness? No   15. Do you have any artifical heart valves or other implanted medical devices like a pacemaker, defibrillator, or continuous glucose monitor? No   16. Do you have artificial joints? No   17. Are you allergic to latex? YES- hands are itchy when wearing latex gloves. No wheeze, anaphylaxis.        Health Care Directive:  Patient does not have a Health Care Directive or Living Will: Discussed advance care planning with patient; however, patient declined at this " "time.    Preoperative Review of :   reviewed - no record of controlled substances prescribed.    For exercise: started walking in spring weather- a mile- 3d per week. Feels good with exercise. Limitations with exercise due to: nothing. Denies CV sxs with exercise including CP, SOB, palpitations, STRINGER, presyncope.    Tobacco smoker - 1 cig per month. \"When I am nervous\".     Status of Chronic Conditions:  DIABETES - Patient has a longstanding history of DiabetesType Type II . Patient is being treated with oral agents and denies significant side effects. Control has been good. Complicating factors include but are not limited to: hypertension.       Review of Systems  CONSTITUTIONAL: NEGATIVE for fever, chills, change in weight  INTEGUMENTARY/SKIN: NEGATIVE for worrisome rashes, moles or lesions  EYES: NEGATIVE for vision changes or irritation  ENT/MOUTH: NEGATIVE for ear, mouth and throat problems  RESP: NEGATIVE for significant cough or SOB  CV: NEGATIVE for chest pain, palpitations or peripheral edema  GI: NEGATIVE for nausea, abdominal pain, heartburn, or change in bowel habits  : NEGATIVE for frequency, dysuria, or hematuria  MUSCULOSKELETAL: NEGATIVE for significant arthralgias or myalgia  NEURO: NEGATIVE for weakness, dizziness or paresthesias  ENDOCRINE: NEGATIVE for temperature intolerance, skin/hair changes  HEME: NEGATIVE for bleeding problems  PSYCHIATRIC: NEGATIVE for changes in mood or affect    Patient Active Problem List    Diagnosis Date Noted     Type 2 diabetes mellitus without complication, without long-term current use of insulin (H) 12/08/2017     Priority: Medium     S/P hysterectomy 12/05/2017     Priority: Medium     Endometrial carcinoma (H)- hyst 12/2017 11/03/2017     Priority: Medium     No radiation or chemotherapy   Gyn/Onc visit- 09/09/2019- Michelle Clark NP- Annual pelvic exam advised (ok to do with primary care), surveillance paps NOT recommended.        ASCUS of cervix with " negative high risk HPV 10/17/2017     Priority: Medium     10/17/17 ASCUS pap, neg HPV. EMB: FIGO grade 1 endometrioid adenocarcinoma.  Gyn Oncology [facility] for 11/20 @ 3:40pm   3/20/18 Gyn Onc visit with Dr. Clark        Past Medical History:   Diagnosis Date     ASCUS of cervix with negative high risk HPV 10/17/2017     Diabetes (H)     oral meds     Endometrial cancer (H)      HTN (hypertension)      Migraine      Obesity      Thyroid disease 7/2005    Graves     Past Surgical History:   Procedure Laterality Date     CYSTOSCOPY N/A 12/05/2017    Procedure: CYSTOSCOPY;;  Surgeon: Facundo Parekh MD;  Location: [facility] OR     DAVINCI HYSTERECTOMY TOTAL, BILATERAL SALPINGO-OOPHORECTOMY, NODE DISSECTION, COMBINED N/A 12/05/2017    Procedure: COMBINED DAVINCI HYSTERECTOMY TOTAL, SALPINGO-OOPHORECTOMY, NODE DISSECTION;  DaVinci Assisted Total Laparoscopic Hysterectomy, Right Salpingo-Oophorectomy, Lysis of Adhesions, Cystoscopy;  Surgeon: Facundo Parekh MD;  Location: [facility] OR     HYSTERECTOMY, PAP NO LONGER INDICATED       LAPAROSCOPIC HYSTERECTOMY TOTAL  12/2017     oopherectomy Left     13 lb benign mass removal     Current Outpatient Medications   Medication Sig Dispense Refill     alcohol swab prep pads Use to swab area of injection/jennifer as directed. 100 each 0     aspirin 81 MG EC tablet Take 1 tablet (81 mg) by mouth daily 90 tablet 3     blood glucose (NO BRAND SPECIFIED) lancets standard Use to test blood sugar 1 times daily or as directed. 100 lancet. 5     blood glucose (NO BRAND SPECIFIED) test strip Use to test blood sugar 1 times daily or as directed. 100 strip 5     blood glucose monitoring (NO BRAND SPECIFIED) meter device kit Use to test blood sugar 1 times daily or as directed. 1 kit 0     lisinopril-hydrochlorothiazide (ZESTORETIC) 10-12.5 MG tablet Take 1 tablet by mouth daily 90 tablet 1     metFORMIN (GLUCOPHAGE) 1000 MG tablet Take 1 tablet (1,000 mg) by mouth 2 times daily (with meals) 180  "tablet 1     simvastatin (ZOCOR) 10 MG tablet Take 1 tablet (10 mg) by mouth At Bedtime 90 tablet 2       Allergies   Allergen Reactions     Latex Itching        Social History     Tobacco Use     Smoking status: Former     Packs/day: 0.50     Years: 38.00     Pack years: 19.00     Types: Cigarettes     Quit date: 2017     Years since quittin.2     Passive exposure: Never     Smokeless tobacco: Never     Tobacco comments:     smoked 30 years- half pack a day   Vaping Use     Vaping status: Never Used   Substance Use Topics     Alcohol use: Yes     Comment: social- 1 drink once a week bowling     Family History   Problem Relation Age of Onset     Diabetes Brother      Hypertension Mother      Hyperlipidemia Mother      Cerebrovascular Disease Mother      Influenza/Pneumonia Father 62     Diabetes Father      Kidney Disease Sister      Diabetes Sister      Depression Sister      Diabetes Brother      Colon Cancer Brother      History   Drug Use No         Objective     /56   Pulse 55   Temp 97.1  F (36.2  C) (Temporal)   Resp 14   Ht 1.6 m (5' 3\")   Wt 78.7 kg (173 lb 9.6 oz)   SpO2 98%   BMI 30.75 kg/m      Physical Exam    GENERAL APPEARANCE: healthy, alert and no distress     EYES: EOMI, PERRL     HENT: ear canals and TM's normal and nose and mouth without ulcers or lesions     NECK: no adenopathy, no asymmetry, masses, or scars and thyroid normal to palpation     RESP: lungs clear to auscultation - no rales, rhonchi or wheezes     CV: regular rates and rhythm, normal S1 S2, no S3 or S4 and no murmur, click or rub     ABDOMEN:  soft, nontender, + protrusion of hernia, nontender, and bowel sounds normal     MS: extremities normal- no gross deformities noted, no evidence of inflammation in joints, FROM in all extremities.     SKIN: no suspicious lesions or rashes     NEURO: Normal strength and tone, sensory exam grossly normal, mentation intact and speech normal     PSYCH: mentation appears " normal. and affect normal/bright     LYMPHATICS: No cervical adenopathy    Recent Labs   Lab Test 04/06/23  0830 10/05/22  0746 03/16/22  1023     --  139   POTASSIUM 4.1  --  4.6   CR 0.98  --  0.91   A1C 6.7* 6.7* 6.8*        Diagnostics:  Recent Results (from the past 168 hour(s))   Hemoglobin A1c    Collection Time: 05/17/23  9:04 AM   Result Value Ref Range    Hemoglobin A1C 6.5 (H) 0.0 - 5.6 %   CBC with platelets    Collection Time: 05/17/23  9:04 AM   Result Value Ref Range    WBC Count 6.8 4.0 - 11.0 10e3/uL    RBC Count 4.38 3.80 - 5.20 10e6/uL    Hemoglobin 13.3 11.7 - 15.7 g/dL    Hematocrit 39.6 35.0 - 47.0 %    MCV 90 78 - 100 fL    MCH 30.4 26.5 - 33.0 pg    MCHC 33.6 31.5 - 36.5 g/dL    RDW 13.0 10.0 - 15.0 %    Platelet Count 235 150 - 450 10e3/uL   Basic metabolic panel  (Ca, Cl, CO2, Creat, Gluc, K, Na, BUN)    Collection Time: 05/17/23  9:04 AM   Result Value Ref Range    Sodium 143 136 - 145 mmol/L    Potassium 4.9 3.4 - 5.3 mmol/L    Chloride 107 98 - 107 mmol/L    Carbon Dioxide (CO2) 22 22 - 29 mmol/L    Anion Gap 14 7 - 15 mmol/L    Urea Nitrogen 26.2 (H) 8.0 - 23.0 mg/dL    Creatinine 0.93 0.51 - 0.95 mg/dL    Calcium 10.1 8.8 - 10.2 mg/dL    Glucose 133 (H) 70 - 99 mg/dL    GFR Estimate 68 >60 mL/min/1.73m2   TSH with free T4 reflex    Collection Time: 05/17/23  9:04 AM   Result Value Ref Range    TSH 0.69 0.30 - 4.20 uIU/mL      EKG: sinus bradycardia, 1st degree AV block, inverted T-waves in both lateral and inferior leads, V1/V2 q wave vs poor r wave progression, there are no prior tracings available, no prior EKG    Revised Cardiac Risk Index (RCRI):  The patient has the following serious cardiovascular risks for perioperative complications:   - No serious cardiac risks = 0 points     RCRI Interpretation: 0 points: Class I (very low risk - 0.4% complication rate)           Signed Electronically by: Tricia Baxter PA-C  Copy of this evaluation report is provided to requesting  physician.

## 2023-05-17 NOTE — PATIENT INSTRUCTIONS
Take your medicines every day, as directed     Changes made today:         Cardiology Care Coordinators:      Nimo SOUSA RN     Cardiology Rooming staff:  Titus SORENSON    Phone  [phone]      Fax [phone]    To Contact us     During Business Hours:  [phone]     If you are needing refills please contact your pharmacy.     For urgent after hour care please call the [facility] at [phone] or the [facility] at [phone] and ask to speak to the cardiologist on call.            HOW TO CHECK YOUR BLOOD PRESSURE AT HOME:     Avoid eating, smoking, and exercising for at least 30 minutes before taking a reading.     Be sure you have taken your BP medication at least 2-3 hours before you check it.      Sit quietly for 10 minutes before a reading.      Sit in a chair with your feet flat on the floor. Rest your  arm on a table so that the arm cuff is at the same level as your heart.     Remain still during the reading.  Record your blood pressure and pulse in a log and bring to your next appointment.       Use [employer] allows you to communicate directly with your heart team through secure messaging.  [employer] can be accessed any time on your phone, computer, or tablet.  If you need assistance, we'd be happy to help!             Keep your Heart Appointments:

## 2023-05-19 ENCOUNTER — HOSPITAL ENCOUNTER (OUTPATIENT)
Dept: CARDIOLOGY | Facility: CLINIC | Age: 63
Discharge: HOME | End: 2023-05-19
Attending: STUDENT IN AN ORGANIZED HEALTH CARE EDUCATION/TRAINING PROGRAM | Admitting: STUDENT IN AN ORGANIZED HEALTH CARE EDUCATION/TRAINING PROGRAM
Payer: COMMERCIAL

## 2023-05-19 DIAGNOSIS — R07.89 ATYPICAL CHEST PAIN: ICD-10-CM

## 2023-05-19 PROCEDURE — 93325 DOPPLER ECHO COLOR FLOW MAPG: CPT | Mod: TC

## 2023-05-19 PROCEDURE — 93017 CV STRESS TEST TRACING ONLY: CPT

## 2023-05-19 PROCEDURE — 93325 DOPPLER ECHO COLOR FLOW MAPG: CPT | Mod: 26 | Performed by: INTERNAL MEDICINE

## 2023-05-19 PROCEDURE — 258N000003 HC RX IP 258 OP 636: Performed by: INTERNAL MEDICINE

## 2023-05-19 PROCEDURE — 93350 STRESS TTE ONLY: CPT | Mod: 26 | Performed by: INTERNAL MEDICINE

## 2023-05-19 PROCEDURE — 93321 DOPPLER ECHO F-UP/LMTD STD: CPT | Mod: 26 | Performed by: INTERNAL MEDICINE

## 2023-05-19 PROCEDURE — 93018 CV STRESS TEST I&R ONLY: CPT | Performed by: INTERNAL MEDICINE

## 2023-05-19 PROCEDURE — 93016 CV STRESS TEST SUPVJ ONLY: CPT | Performed by: INTERNAL MEDICINE

## 2023-05-19 PROCEDURE — 250N000009 HC RX 250: Performed by: INTERNAL MEDICINE

## 2023-05-19 PROCEDURE — 255N000002 HC RX 255 OP 636: Performed by: INTERNAL MEDICINE

## 2023-05-19 PROCEDURE — 250N000011 HC RX IP 250 OP 636: Performed by: INTERNAL MEDICINE

## 2023-05-19 RX ORDER — SODIUM CHLORIDE 9 MG/ML
INJECTION, SOLUTION INTRAVENOUS CONTINUOUS
Status: ACTIVE | OUTPATIENT
Start: 2023-05-19 | End: 2023-05-19

## 2023-05-19 RX ORDER — ATROPINE SULFATE 0.4 MG/ML
.2-2 AMPUL (ML) INJECTION
Status: COMPLETED | OUTPATIENT
Start: 2023-05-19 | End: 2023-05-19

## 2023-05-19 RX ORDER — METOPROLOL TARTRATE 1 MG/ML
1-20 INJECTION, SOLUTION INTRAVENOUS
Status: ACTIVE | OUTPATIENT
Start: 2023-05-19 | End: 2023-05-19

## 2023-05-19 RX ORDER — DOBUTAMINE HYDROCHLORIDE 200 MG/100ML
10-50 INJECTION INTRAVENOUS CONTINUOUS
Status: ACTIVE | OUTPATIENT
Start: 2023-05-19 | End: 2023-05-19

## 2023-05-19 RX ADMIN — METOPROLOL TARTRATE 2 MG: 5 INJECTION INTRAVENOUS at 13:45

## 2023-05-19 RX ADMIN — ATROPINE SULFATE 0.2 MG: 0.4 INJECTION, SOLUTION INTRAVENOUS at 13:40

## 2023-05-19 RX ADMIN — PERFLUTREN 5 ML: 6.52 INJECTION, SUSPENSION INTRAVENOUS at 13:45

## 2023-05-19 RX ADMIN — DOBUTAMINE 10 MCG/KG/MIN: 12.5 INJECTION, SOLUTION, CONCENTRATE INTRAVENOUS at 13:30

## 2023-05-19 NOTE — PROGRESS NOTES
Pt here for dobutamine stress test. Test, meds and side effects reviewed with patient. Achieved target HR at 40 mcg Dobutamine and a total of 0.2 mg IV atropine. Gave a total of 2 mg IV Metoprolol to bring HR back to baseline. Post monitoring complete and VSS. Pt escorted out to the gold waiting room.     Misty Ayala RN

## 2023-05-24 ENCOUNTER — ANESTHESIA (OUTPATIENT)
Dept: SURGERY | Facility: CLINIC | Age: 63
DRG: 355 | End: 2023-05-24
Payer: COMMERCIAL

## 2023-05-24 ENCOUNTER — ANESTHESIA EVENT (OUTPATIENT)
Dept: SURGERY | Facility: CLINIC | Age: 63
DRG: 355 | End: 2023-05-24
Payer: COMMERCIAL

## 2023-05-24 ENCOUNTER — HOSPITAL ENCOUNTER (INPATIENT)
Facility: CLINIC | Age: 63
LOS: 1 days | Discharge: HOME | DRG: 355 | End: 2023-05-24
Attending: SPECIALIST | Admitting: SPECIALIST
Payer: COMMERCIAL

## 2023-05-24 VITALS
OXYGEN SATURATION: 93 % | RESPIRATION RATE: 18 BRPM | DIASTOLIC BLOOD PRESSURE: 71 MMHG | TEMPERATURE: 98 F | HEART RATE: 65 BPM | SYSTOLIC BLOOD PRESSURE: 157 MMHG

## 2023-05-24 DIAGNOSIS — G89.18 POST-OP PAIN: ICD-10-CM

## 2023-05-24 DIAGNOSIS — R11.0 NAUSEA: Primary | ICD-10-CM

## 2023-05-24 LAB
GLUCOSE BLDC GLUCOMTR-MCNC: 169 MG/DL (ref 70–99)
GLUCOSE BLDC GLUCOMTR-MCNC: 284 MG/DL (ref 70–99)

## 2023-05-24 PROCEDURE — 250N000009 HC RX 250: Performed by: NURSE ANESTHETIST, CERTIFIED REGISTERED

## 2023-05-24 PROCEDURE — 120N000001 HC R&B MED SURG/OB

## 2023-05-24 PROCEDURE — 370N000017 HC ANESTHESIA TECHNICAL FEE, PER MIN: Performed by: SPECIALIST

## 2023-05-24 PROCEDURE — 999N000141 HC STATISTIC PRE-PROCEDURE NURSING ASSESSMENT: Performed by: SPECIALIST

## 2023-05-24 PROCEDURE — 258N000003 HC RX IP 258 OP 636: Performed by: NURSE ANESTHETIST, CERTIFIED REGISTERED

## 2023-05-24 PROCEDURE — 250N000009 HC RX 250: Performed by: SPECIALIST

## 2023-05-24 PROCEDURE — 250N000011 HC RX IP 250 OP 636: Mod: JZ | Performed by: NURSE ANESTHETIST, CERTIFIED REGISTERED

## 2023-05-24 PROCEDURE — 250N000012 HC RX MED GY IP 250 OP 636 PS 637: Performed by: NURSE ANESTHETIST, CERTIFIED REGISTERED

## 2023-05-24 PROCEDURE — S2900 ROBOTIC SURGICAL SYSTEM: HCPCS | Performed by: SPECIALIST

## 2023-05-24 PROCEDURE — 710N000010 HC RECOVERY PHASE 1, LEVEL 2, PER MIN: Performed by: SPECIALIST

## 2023-05-24 PROCEDURE — C1781 MESH (IMPLANTABLE): HCPCS | Performed by: SPECIALIST

## 2023-05-24 PROCEDURE — 710N000012 HC RECOVERY PHASE 2, PER MINUTE: Performed by: SPECIALIST

## 2023-05-24 PROCEDURE — 250N000011 HC RX IP 250 OP 636: Performed by: NURSE ANESTHETIST, CERTIFIED REGISTERED

## 2023-05-24 PROCEDURE — 8E0W4CZ ROBOTIC ASSISTED PROCEDURE OF TRUNK REGION, PERCUTANEOUS ENDOSCOPIC APPROACH: ICD-10-PCS | Performed by: SPECIALIST

## 2023-05-24 PROCEDURE — 250N000026 HC DESFLURANE, PER MIN: Performed by: SPECIALIST

## 2023-05-24 PROCEDURE — 360N000080 HC SURGERY LEVEL 7, PER MIN: Performed by: SPECIALIST

## 2023-05-24 PROCEDURE — 0WUF4JZ SUPPLEMENT ABDOMINAL WALL WITH SYNTHETIC SUBSTITUTE, PERCUTANEOUS ENDOSCOPIC APPROACH: ICD-10-PCS | Performed by: SPECIALIST

## 2023-05-24 PROCEDURE — 272N000001 HC OR GENERAL SUPPLY STERILE: Performed by: SPECIALIST

## 2023-05-24 PROCEDURE — 250N000013 HC RX MED GY IP 250 OP 250 PS 637: Performed by: NURSE ANESTHETIST, CERTIFIED REGISTERED

## 2023-05-24 PROCEDURE — 250N000011 HC RX IP 250 OP 636: Performed by: SPECIALIST

## 2023-05-24 PROCEDURE — 271N000001 HC OR GENERAL SUPPLY NON-STERILE: Performed by: SPECIALIST

## 2023-05-24 PROCEDURE — 49596 RPR AA HRN 1ST > 10 NCR/STRN: CPT | Performed by: SPECIALIST

## 2023-05-24 PROCEDURE — 82962 GLUCOSE BLOOD TEST: CPT

## 2023-05-24 DEVICE — MESH SYMBOTEX COMPOSITE STEX 15CM X 10CM SYM1510: Type: IMPLANTABLE DEVICE | Site: ABDOMEN | Status: FUNCTIONAL

## 2023-05-24 RX ORDER — HYDROMORPHONE HYDROCHLORIDE 1 MG/ML
0.2 INJECTION, SOLUTION INTRAMUSCULAR; INTRAVENOUS; SUBCUTANEOUS EVERY 5 MIN PRN
Status: DISCONTINUED | OUTPATIENT
Start: 2023-05-24 | End: 2023-05-24 | Stop reason: HOSPADM

## 2023-05-24 RX ORDER — BUPIVACAINE HYDROCHLORIDE AND EPINEPHRINE 2.5; 5 MG/ML; UG/ML
INJECTION, SOLUTION EPIDURAL; INFILTRATION; INTRACAUDAL; PERINEURAL PRN
Status: DISCONTINUED | OUTPATIENT
Start: 2023-05-24 | End: 2023-05-24 | Stop reason: HOSPADM

## 2023-05-24 RX ORDER — PROPOFOL 10 MG/ML
INJECTION, EMULSION INTRAVENOUS CONTINUOUS PRN
Status: DISCONTINUED | OUTPATIENT
Start: 2023-05-24 | End: 2023-05-24

## 2023-05-24 RX ORDER — ONDANSETRON 4 MG/1
4 TABLET, ORALLY DISINTEGRATING ORAL EVERY 6 HOURS PRN
Status: DISCONTINUED | OUTPATIENT
Start: 2023-05-24 | End: 2023-05-24 | Stop reason: HOSPADM

## 2023-05-24 RX ORDER — FENTANYL CITRATE 50 UG/ML
25 INJECTION, SOLUTION INTRAMUSCULAR; INTRAVENOUS EVERY 5 MIN PRN
Status: DISCONTINUED | OUTPATIENT
Start: 2023-05-24 | End: 2023-05-24 | Stop reason: HOSPADM

## 2023-05-24 RX ORDER — LIDOCAINE HYDROCHLORIDE 10 MG/ML
INJECTION, SOLUTION INFILTRATION; PERINEURAL PRN
Status: DISCONTINUED | OUTPATIENT
Start: 2023-05-24 | End: 2023-05-24

## 2023-05-24 RX ORDER — SODIUM CHLORIDE, SODIUM LACTATE, POTASSIUM CHLORIDE, CALCIUM CHLORIDE 600; 310; 30; 20 MG/100ML; MG/100ML; MG/100ML; MG/100ML
INJECTION, SOLUTION INTRAVENOUS CONTINUOUS
Status: DISCONTINUED | OUTPATIENT
Start: 2023-05-24 | End: 2023-05-24 | Stop reason: HOSPADM

## 2023-05-24 RX ORDER — FENTANYL CITRATE 50 UG/ML
50 INJECTION, SOLUTION INTRAMUSCULAR; INTRAVENOUS EVERY 5 MIN PRN
Status: DISCONTINUED | OUTPATIENT
Start: 2023-05-24 | End: 2023-05-24 | Stop reason: HOSPADM

## 2023-05-24 RX ORDER — KETOROLAC TROMETHAMINE 30 MG/ML
INJECTION, SOLUTION INTRAMUSCULAR; INTRAVENOUS PRN
Status: DISCONTINUED | OUTPATIENT
Start: 2023-05-24 | End: 2023-05-24

## 2023-05-24 RX ORDER — ACETAMINOPHEN 325 MG/1
975 TABLET ORAL
Status: DISCONTINUED | OUTPATIENT
Start: 2023-05-24 | End: 2023-05-24 | Stop reason: HOSPADM

## 2023-05-24 RX ORDER — ONDANSETRON 2 MG/ML
4 INJECTION INTRAMUSCULAR; INTRAVENOUS EVERY 6 HOURS PRN
Status: DISCONTINUED | OUTPATIENT
Start: 2023-05-24 | End: 2023-05-24 | Stop reason: HOSPADM

## 2023-05-24 RX ORDER — FAMOTIDINE 20 MG/1
20 TABLET, FILM COATED ORAL 2 TIMES DAILY
Status: DISCONTINUED | OUTPATIENT
Start: 2023-05-24 | End: 2023-05-24 | Stop reason: HOSPADM

## 2023-05-24 RX ORDER — LIDOCAINE 40 MG/G
CREAM TOPICAL
Status: DISCONTINUED | OUTPATIENT
Start: 2023-05-24 | End: 2023-05-24 | Stop reason: HOSPADM

## 2023-05-24 RX ORDER — ONDANSETRON 2 MG/ML
4 INJECTION INTRAMUSCULAR; INTRAVENOUS EVERY 30 MIN PRN
Status: DISCONTINUED | OUTPATIENT
Start: 2023-05-24 | End: 2023-05-24 | Stop reason: HOSPADM

## 2023-05-24 RX ORDER — HYDROXYZINE HYDROCHLORIDE 10 MG/1
10 TABLET, FILM COATED ORAL EVERY 6 HOURS PRN
Status: DISCONTINUED | OUTPATIENT
Start: 2023-05-24 | End: 2023-05-24 | Stop reason: HOSPADM

## 2023-05-24 RX ORDER — ACETAMINOPHEN 325 MG/1
975 TABLET ORAL EVERY 6 HOURS PRN
Status: DISCONTINUED | OUTPATIENT
Start: 2023-05-24 | End: 2023-05-24 | Stop reason: HOSPADM

## 2023-05-24 RX ORDER — ONDANSETRON 4 MG/1
4 TABLET, ORALLY DISINTEGRATING ORAL EVERY 30 MIN PRN
Status: DISCONTINUED | OUTPATIENT
Start: 2023-05-24 | End: 2023-05-24 | Stop reason: HOSPADM

## 2023-05-24 RX ORDER — OXYCODONE HYDROCHLORIDE 5 MG/1
5-10 TABLET ORAL EVERY 6 HOURS PRN
Qty: 10 TABLET | Refills: 0 | Status: SHIPPED | OUTPATIENT
Start: 2023-05-24 | End: 2023-06-01

## 2023-05-24 RX ORDER — KETAMINE HYDROCHLORIDE 10 MG/ML
INJECTION INTRAMUSCULAR; INTRAVENOUS PRN
Status: DISCONTINUED | OUTPATIENT
Start: 2023-05-24 | End: 2023-05-24

## 2023-05-24 RX ORDER — ONDANSETRON 4 MG/1
4-8 TABLET, ORALLY DISINTEGRATING ORAL EVERY 8 HOURS PRN
Qty: 10 TABLET | Refills: 0 | Status: SHIPPED | OUTPATIENT
Start: 2023-05-24 | End: 2023-11-01

## 2023-05-24 RX ORDER — HALOPERIDOL 5 MG/ML
1 INJECTION INTRAMUSCULAR
Status: DISCONTINUED | OUTPATIENT
Start: 2023-05-24 | End: 2023-05-24 | Stop reason: HOSPADM

## 2023-05-24 RX ORDER — DEXTROSE MONOHYDRATE 25 G/50ML
25-50 INJECTION, SOLUTION INTRAVENOUS
Status: DISCONTINUED | OUTPATIENT
Start: 2023-05-24 | End: 2023-05-24 | Stop reason: HOSPADM

## 2023-05-24 RX ORDER — OXYCODONE HYDROCHLORIDE 5 MG/1
5 TABLET ORAL
Status: COMPLETED | OUTPATIENT
Start: 2023-05-24 | End: 2023-05-24

## 2023-05-24 RX ORDER — HYDROMORPHONE HYDROCHLORIDE 1 MG/ML
0.4 INJECTION, SOLUTION INTRAMUSCULAR; INTRAVENOUS; SUBCUTANEOUS
Status: DISCONTINUED | OUTPATIENT
Start: 2023-05-24 | End: 2023-05-24 | Stop reason: HOSPADM

## 2023-05-24 RX ORDER — OXYCODONE HYDROCHLORIDE 5 MG/1
5 TABLET ORAL EVERY 4 HOURS PRN
Status: DISCONTINUED | OUTPATIENT
Start: 2023-05-24 | End: 2023-05-24 | Stop reason: HOSPADM

## 2023-05-24 RX ORDER — OXYCODONE HYDROCHLORIDE 5 MG/1
10 TABLET ORAL EVERY 4 HOURS PRN
Status: DISCONTINUED | OUTPATIENT
Start: 2023-05-24 | End: 2023-05-24 | Stop reason: HOSPADM

## 2023-05-24 RX ORDER — ONDANSETRON 2 MG/ML
INJECTION INTRAMUSCULAR; INTRAVENOUS PRN
Status: DISCONTINUED | OUTPATIENT
Start: 2023-05-24 | End: 2023-05-24

## 2023-05-24 RX ORDER — PROCHLORPERAZINE MALEATE 5 MG
5 TABLET ORAL EVERY 6 HOURS PRN
Status: DISCONTINUED | OUTPATIENT
Start: 2023-05-24 | End: 2023-05-24 | Stop reason: HOSPADM

## 2023-05-24 RX ORDER — NICOTINE POLACRILEX 4 MG
15-30 LOZENGE BUCCAL
Status: DISCONTINUED | OUTPATIENT
Start: 2023-05-24 | End: 2023-05-24 | Stop reason: HOSPADM

## 2023-05-24 RX ORDER — DEXAMETHASONE SODIUM PHOSPHATE 10 MG/ML
INJECTION, SOLUTION INTRAMUSCULAR; INTRAVENOUS PRN
Status: DISCONTINUED | OUTPATIENT
Start: 2023-05-24 | End: 2023-05-24

## 2023-05-24 RX ORDER — CEFAZOLIN SODIUM/WATER 2 G/20 ML
2 SYRINGE (ML) INTRAVENOUS
Status: COMPLETED | OUTPATIENT
Start: 2023-05-24 | End: 2023-05-24

## 2023-05-24 RX ORDER — PROPOFOL 10 MG/ML
INJECTION, EMULSION INTRAVENOUS PRN
Status: DISCONTINUED | OUTPATIENT
Start: 2023-05-24 | End: 2023-05-24

## 2023-05-24 RX ORDER — HYDROMORPHONE HYDROCHLORIDE 1 MG/ML
0.4 INJECTION, SOLUTION INTRAMUSCULAR; INTRAVENOUS; SUBCUTANEOUS EVERY 5 MIN PRN
Status: DISCONTINUED | OUTPATIENT
Start: 2023-05-24 | End: 2023-05-24 | Stop reason: HOSPADM

## 2023-05-24 RX ORDER — HYDROMORPHONE HYDROCHLORIDE 1 MG/ML
0.2 INJECTION, SOLUTION INTRAMUSCULAR; INTRAVENOUS; SUBCUTANEOUS
Status: DISCONTINUED | OUTPATIENT
Start: 2023-05-24 | End: 2023-05-24 | Stop reason: HOSPADM

## 2023-05-24 RX ORDER — BUPIVACAINE HYDROCHLORIDE AND EPINEPHRINE 2.5; 5 MG/ML; UG/ML
INJECTION, SOLUTION INFILTRATION; PERINEURAL PRN
Status: DISCONTINUED | OUTPATIENT
Start: 2023-05-24 | End: 2023-05-24

## 2023-05-24 RX ORDER — OXYCODONE HYDROCHLORIDE 5 MG/1
10 TABLET ORAL
Status: DISCONTINUED | OUTPATIENT
Start: 2023-05-24 | End: 2023-05-24 | Stop reason: HOSPADM

## 2023-05-24 RX ORDER — FENTANYL CITRATE 50 UG/ML
INJECTION, SOLUTION INTRAMUSCULAR; INTRAVENOUS PRN
Status: DISCONTINUED | OUTPATIENT
Start: 2023-05-24 | End: 2023-05-24

## 2023-05-24 RX ORDER — CEFAZOLIN SODIUM/WATER 2 G/20 ML
2 SYRINGE (ML) INTRAVENOUS SEE ADMIN INSTRUCTIONS
Status: DISCONTINUED | OUTPATIENT
Start: 2023-05-24 | End: 2023-05-24 | Stop reason: HOSPADM

## 2023-05-24 RX ADMIN — ONDANSETRON 4 MG: 2 INJECTION INTRAMUSCULAR; INTRAVENOUS at 11:13

## 2023-05-24 RX ADMIN — HYDROMORPHONE HYDROCHLORIDE 0.2 MG: 1 INJECTION, SOLUTION INTRAMUSCULAR; INTRAVENOUS; SUBCUTANEOUS at 11:30

## 2023-05-24 RX ADMIN — Medication 2 G: at 07:30

## 2023-05-24 RX ADMIN — HYDROMORPHONE HYDROCHLORIDE 0.5 MG: 1 INJECTION, SOLUTION INTRAMUSCULAR; INTRAVENOUS; SUBCUTANEOUS at 08:31

## 2023-05-24 RX ADMIN — ROCURONIUM BROMIDE 20 MG: 50 INJECTION, SOLUTION INTRAVENOUS at 08:31

## 2023-05-24 RX ADMIN — DEXMEDETOMIDINE HYDROCHLORIDE 10 MCG: 100 INJECTION, SOLUTION INTRAVENOUS at 08:22

## 2023-05-24 RX ADMIN — ROCURONIUM BROMIDE 20 MG: 50 INJECTION, SOLUTION INTRAVENOUS at 09:00

## 2023-05-24 RX ADMIN — ROCURONIUM BROMIDE 10 MG: 50 INJECTION, SOLUTION INTRAVENOUS at 09:48

## 2023-05-24 RX ADMIN — Medication 20 MG: at 07:56

## 2023-05-24 RX ADMIN — PROPOFOL 100 MCG/KG/MIN: 10 INJECTION, EMULSION INTRAVENOUS at 07:38

## 2023-05-24 RX ADMIN — BUPIVACAINE HYDROCHLORIDE AND EPINEPHRINE BITARTRATE 20 ML: 2.5; .005 INJECTION, SOLUTION INFILTRATION; PERINEURAL at 10:54

## 2023-05-24 RX ADMIN — LIDOCAINE HYDROCHLORIDE 50 MG: 10 INJECTION, SOLUTION INFILTRATION; PERINEURAL at 07:37

## 2023-05-24 RX ADMIN — ROCURONIUM BROMIDE 50 MG: 50 INJECTION, SOLUTION INTRAVENOUS at 07:37

## 2023-05-24 RX ADMIN — LIDOCAINE HYDROCHLORIDE 0.5 ML: 10 INJECTION, SOLUTION EPIDURAL; INFILTRATION; INTRACAUDAL; PERINEURAL at 06:51

## 2023-05-24 RX ADMIN — FENTANYL CITRATE 50 MCG: 50 INJECTION INTRAMUSCULAR; INTRAVENOUS at 11:19

## 2023-05-24 RX ADMIN — FENTANYL CITRATE 50 MCG: 50 INJECTION, SOLUTION INTRAMUSCULAR; INTRAVENOUS at 08:20

## 2023-05-24 RX ADMIN — SODIUM CHLORIDE, POTASSIUM CHLORIDE, SODIUM LACTATE AND CALCIUM CHLORIDE: 600; 310; 30; 20 INJECTION, SOLUTION INTRAVENOUS at 06:51

## 2023-05-24 RX ADMIN — FENTANYL CITRATE 50 MCG: 50 INJECTION INTRAMUSCULAR; INTRAVENOUS at 11:11

## 2023-05-24 RX ADMIN — INSULIN ASPART 3 UNITS: 100 INJECTION, SOLUTION INTRAVENOUS; SUBCUTANEOUS at 12:42

## 2023-05-24 RX ADMIN — FENTANYL CITRATE 50 MCG: 50 INJECTION, SOLUTION INTRAMUSCULAR; INTRAVENOUS at 07:48

## 2023-05-24 RX ADMIN — MIDAZOLAM 1 MG: 1 INJECTION INTRAMUSCULAR; INTRAVENOUS at 07:30

## 2023-05-24 RX ADMIN — BUPIVACAINE HYDROCHLORIDE AND EPINEPHRINE BITARTRATE 20 ML: 2.5; .005 INJECTION, SOLUTION INFILTRATION; PERINEURAL at 10:49

## 2023-05-24 RX ADMIN — SODIUM CHLORIDE, POTASSIUM CHLORIDE, SODIUM LACTATE AND CALCIUM CHLORIDE: 600; 310; 30; 20 INJECTION, SOLUTION INTRAVENOUS at 10:56

## 2023-05-24 RX ADMIN — ONDANSETRON 4 MG: 2 INJECTION INTRAMUSCULAR; INTRAVENOUS at 07:50

## 2023-05-24 RX ADMIN — KETOROLAC TROMETHAMINE 30 MG: 30 INJECTION, SOLUTION INTRAMUSCULAR at 10:38

## 2023-05-24 RX ADMIN — OXYCODONE HYDROCHLORIDE 5 MG: 5 TABLET ORAL at 13:07

## 2023-05-24 RX ADMIN — DEXAMETHASONE SODIUM PHOSPHATE 10 MG: 10 INJECTION, SOLUTION INTRAMUSCULAR; INTRAVENOUS at 07:50

## 2023-05-24 RX ADMIN — SUGAMMADEX 200 MG: 100 INJECTION, SOLUTION INTRAVENOUS at 10:38

## 2023-05-24 RX ADMIN — DEXMEDETOMIDINE HYDROCHLORIDE 10 MCG: 100 INJECTION, SOLUTION INTRAVENOUS at 08:31

## 2023-05-24 RX ADMIN — PROPOFOL 200 MG: 10 INJECTION, EMULSION INTRAVENOUS at 07:37

## 2023-05-24 ASSESSMENT — ACTIVITIES OF DAILY LIVING (ADL)
ADLS_ACUITY_SCORE: 35

## 2023-05-24 ASSESSMENT — LIFESTYLE VARIABLES: TOBACCO_USE: 1

## 2023-05-24 NOTE — ANESTHESIA PROCEDURE NOTES
Airway       Patient location during procedure: OR       Procedure Start/Stop Times: 5/24/2023 7:40 AM  Staff -        CRNA: Araceli Poe APRN CRNA       Performed By: CRNA  Consent for Airway        Urgency: elective  Indications and Patient Condition       Indications for airway management: mega-procedural       Induction type:intravenous       Mask difficulty assessment: 1 - vent by mask    Final Airway Details       Final airway type: endotracheal airway       Successful airway: ETT - single  Endotracheal Airway Details        ETT size (mm): 6.5       Cuffed: yes       Cuff volume (mL): 8       Successful intubation technique: direct laryngoscopy       DL Blade Type: Reece 2       Grade View of Cords: 1       Adjucts: stylet       Position: Right       Measured from: lips       Secured at (cm): 22       Bite block used: None    Post intubation assessment        Placement verified by: capnometry, equal breath sounds and chest rise        Number of attempts at approach: 1       Number of other approaches attempted: 0       Secured with: silk tape       Ease of procedure: easy       Dentition: Intact and Unchanged    Medication(s) Administered   Medication Administration Time: 5/24/2023 7:40 AM    Additional Comments       Atraumatic. No apparent complications.

## 2023-05-24 NOTE — OP NOTE
Procedure Date: 05/24/2023    PREOPERATIVE DIAGNOSIS:  Incisional hernia.    POSTOPERATIVE DIAGNOSIS:  Incarcerated incisional hernia.    PROCEDURE PERFORMED:  1.  Diagnostic laparoscopy.  2.  Laparoscopic lysis of adhesions.  3.  Robotically-assisted incarcerated incisional hernia repair with a total defect size of 14 x 8 cm and the primary defect was 10 x 8 cm and the secondary defect was 1 x 1 cm.    SURGEON:  Anival Williamson MD    ANESTHESIA:  General endotracheal.    INDICATIONS FOR PROCEDURE:  A 63-year-old lady who had a previous open midline incision for removal of an ovarian tumor many years ago in the supraumbilical aspect.  She did develop a hernia and she opted to have it repaired.    OPERATIVE FINDINGS:  Included a total defect size of 14 x 8 cm with a primary defect of 10 x 8 cm and the secondary was 1 x 1 cm.    DESCRIPTION OF PROCEDURE:  The patient was taken to the operating room and placed on the operating table in supine position.  After induction of anesthesia, the abdomen prepped and draped in sterile fashion.  A timeout was performed confirming the day and the patient, as well as the procedure to be performed.  A left upper quadrant incision was made.  A 5 mm Visiport was inserted.  The abdomen was insufflated to 15 mmHg pressure.  The hernia was readily seen.  There were found to have it contained incarcerated omentum and a loop of small bowel.  There was also some left lower quadrant adhesions, so a left flank port was initially placed to facilitate placement of the other ports, a laparoscopic lysis of adhesions was then carried out in the left lower quadrant to facilitate placement of a left lower quadrant robotic port.  We then placed an 8 mm robotic port above the 5 mm port.  The robot was then targeted and docked.  We then began taking up the adhesions inside the hernia sac.  These were taken down with EndoShears.  After reducing the omentum, the loop of small bowel was carefully taken  down using EndoShears, it was inspected with no evidence of any bowel injury.  After taking down the primary defect, it was measured about 10 x 8 cm in size.  There was also a separate satellite defect way inferior to  the umbilicus that only measured 1 x 1 cm in size.  So it was elected to primarily repair both of these but only cover the superior one with the mesh as the 1 x 1 cm defect was small.  The defect was then closed.  We then switched out and the defect was closed using a running 0 Stratafix and then separately the infraumbilical defect was closed using a figure-of-eight 0 Stratafix.  The needles were then removed.  I then returned to the operative field from the console.  A piece of 10 x 15 mesh was then chosen.  This was to provide adequate superior and inferior overlap, as well as lateral overlap.  An 0 Vicryl stay suture was placed in the middle.  The mesh was introduced into the abdomen.  A small stab incision was made over the hernia defect.  An 0 Vicryl transfascial suture was then used to center the mesh over the defect.  After this was tied in place, I then returned to the console.  Multiple 2.0 Stratafix sutures were then used to suture the mesh circumferentially to the fascia.  After securing the mesh, there were several areas where it was adherent in the center and a spiral tacker was then used to hold these down to create better approximation of the mesh.  All needles were removed.  The robot was then docked.  We then inspected the entire area.  There was no evidence of any bleeding or bowel injury.  All trocars were removed under direct visualization without any bleeding.  The skin incision was then closed using 3-0 Vicryl and Exofin glue.  A TAP block was placed by anesthesia and the patient was taken from the operating room to recovery in stable condition to be admitted to observation.    Anival Williamson MD, FACS        D: 05/24/2023   T: 05/24/2023   MT: LEYDI    Name:     DAGOBERTO COLUNGA  AZUL  MRN:      [id number]        Account:        [id number]   :      1960           Procedure Date: 2023     Document: [id number]

## 2023-05-24 NOTE — ANESTHESIA CARE TRANSFER NOTE
Patient: Shelbi Howard    Procedure: Procedure(s):  HERNIORRHAPHY, INCISIONAL, ROBOT-ASSISTED, LAPAROSCOPIC, USING DA JUVENTINO XI       Diagnosis: Incisional hernia, without obstruction or gangrene [K43.2]  Diagnosis Additional Information: No value filed.    Anesthesia Type:   General     Note:    Oropharynx: oropharynx clear of all foreign objects and spontaneously breathing  Level of Consciousness: drowsy  Oxygen Supplementation: face mask    Independent Airway: airway patency satisfactory and stable  Dentition: dentition unchanged  Vital Signs Stable: post-procedure vital signs reviewed and stable  Report to RN Given: handoff report given  Patient transferred to: PACU    Handoff Report: Identifed the Patient, Identified the Reponsible Provider, Reviewed the pertinent medical history, Discussed the surgical course, Reviewed Intra-OP anesthesia mangement and issues during anesthesia, Set expectations for post-procedure period and Allowed opportunity for questions and acknowledgement of understanding      Vitals:  Vitals Value Taken Time   BP     Temp     Pulse 66 05/24/23 1103   Resp     SpO2 97 % 05/24/23 1103   Vitals shown include unvalidated device data.    Electronically Signed By: BERLIN Navarro CRNA  May 24, 2023  11:05 AM

## 2023-05-24 NOTE — ANESTHESIA PROCEDURE NOTES
TAP Procedure Note    Pre-Procedure   Staff -        CRNA: Araceli Poe APRN CRNA       Performed By: CRNA       Location: OR       Procedure Start/Stop Times: 5/24/2023 10:34 AM and 5/24/2023 10:44 AM       Pre-Anesthestic Checklist: patient identified, IV checked, site marked, risks and benefits discussed, informed consent, monitors and equipment checked, pre-op evaluation, at physician/surgeon's request and post-op pain management  Timeout:       Correct Patient: Yes        Correct Procedure: Yes        Correct Site: Yes        Correct Position: Yes        Correct Laterality: Yes        Site Marked: Yes  Procedure Documentation  Procedure: TAP       Diagnosis: INCISIONAL HERNIA REPAIR       Laterality: bilateral       Patient Position: supine       Patient Prep/Sterile Barriers: sterile gloves, mask       Skin prep: Chloraprep       Needle Type: insulated       Needle Gauge: 21.        Needle Length (millimeters): 100        Ultrasound guided       1. Ultrasound was used to identify targeted nerve, plexus, vascular marker, or fascial plane and place a needle adjacent to it in real-time.       2. Ultrasound was used to visualize the spread of anesthetic in close proximity to the above referenced structure.       3. A permanent image is entered into the patient's record.    Assessment/Narrative         The placement was negative for: blood aspirated, painful injection and site bleeding       Paresthesias: No.       Test dose of mL at.         Test dose negative, 3 minutes after injection, for signs of intravascular, subdural, or intrathecal injection.       Bolus given via needle..        Secured via.        Insertion/Infusion Method: Single Shot       Complications: none       Injection made incrementally with aspirations every 5 mL.    Medication(s) Administered   Medication Administration Time: 5/24/2023 10:34 AM     Comments:  Pt tolerated the procedure well as under General Anesthesia.  There was good  "visualization of the space between the internal oblique and the transverses abdominis.  There was also good visualization of fluid dissection in this layer.  No complications were noted.  I will follow up with this pt if needed.      FOR [facility] ([facility] [place]) ONLY:   Pain Team Contact information: please page the Pain Team Via [employer]. Search \"Pain\". During daytime hours, please page the attending first. At night please page the resident first.      "

## 2023-05-24 NOTE — ANESTHESIA PREPROCEDURE EVALUATION
Anesthesia Pre-Procedure Evaluation    Patient: Shelbi Howard   MRN: [id number] : 1960        Procedure : Procedure(s):  HERNIORRHAPHY, INCISIONAL, ROBOT-ASSISTED, LAPAROSCOPIC, USING DA JUVENTINO XI  POSSIBLE HERNIORRHAPHY, INCISIONAL, OPEN          Past Medical History:   Diagnosis Date     ASCUS of cervix with negative high risk HPV 10/17/2017     Diabetes (H)     oral meds     Endometrial cancer (H)      HTN (hypertension)      Migraine      Obesity      Thyroid disease 2005    Graves      Past Surgical History:   Procedure Laterality Date     ABDOMEN SURGERY       CYSTOSCOPY N/A 2017    Procedure: CYSTOSCOPY;;  Surgeon: Facundo Parekh MD;  Location: [facility] OR     DAVINCI HYSTERECTOMY TOTAL, BILATERAL SALPINGO-OOPHORECTOMY, NODE DISSECTION, COMBINED N/A 2017    Procedure: COMBINED DAVINCI HYSTERECTOMY TOTAL, SALPINGO-OOPHORECTOMY, NODE DISSECTION;  DaVinci Assisted Total Laparoscopic Hysterectomy, Right Salpingo-Oophorectomy, Lysis of Adhesions, Cystoscopy;  Surgeon: Facundo Parekh MD;  Location: [facility] OR     HYSTERECTOMY, PAP NO LONGER INDICATED       LAPAROSCOPIC HYSTERECTOMY TOTAL  2017     oopherectomy Left     13 lb benign mass removal     ORTHOPEDIC SURGERY        Allergies   Allergen Reactions     Latex Itching      Social History     Tobacco Use     Smoking status: Some Days     Packs/day: 0.00     Years: 38.00     Pack years: 0.00     Types: Cigarettes     Last attempt to quit: 2017     Years since quittin.2     Passive exposure: Never     Smokeless tobacco: Never     Tobacco comments:     smoked 30 years- half pack a day. States that she does not smoke very often anymore (23)- 1 cig per month    Vaping Use     Vaping status: Never Used   Substance Use Topics     Alcohol use: Yes     Comment: social      Wt Readings from Last 1 Encounters:   23 79.8 kg (176 lb)        Anesthesia Evaluation   Pt has had prior anesthetic. Type: MAC and General.    No history  of anesthetic complications       ROS/MED HX  ENT/Pulmonary:     (+) tobacco use, Current use,     Neurologic:  - neg neurologic ROS     Cardiovascular:     (+) hypertension-----Previous cardiac testing   Echo: Date: Results:    Stress Test: Date: 5/19/23 Results:  Interpretation Summary  Normal, low-risk dobutamine stress echocardiogram.  The target heart rate was achieved. Normal heart rate and BP response to  dobutamine.  Normal biventricular size, thickness, and global systolic function at  baseline, LVEF=55-60%.  With dobutamine LVEF increased to >70% and LV cavity size decreased  appropriately.  No regional wall motion abnormalities are present at rest or with dobutamine.  No angina was elicited.  No ECG evidence of ischemia.  PVC s noted during stress test. PVC Triplet during recovery likely dobutamine  effect.  No significant valvular abnormalities are noted on screening Doppler exam.  The aortic root and visualized ascending aorta are normal.  ______________________________________________________________________________  Stress  The drug infusion was stopped due to target heart rate achieved.  The patient did not exhibit any symptoms during drug infusion.  The maximum dose of dobutamine was 40mcg/kg/min.  The maximum dose of atropine was 0.2mg.  The maximum dose of metoprolol was 2mg.  Definity (NDC #33778-309-09) given intravenously.  Patient was given 5.ml mixture of 1.5ml Definity and 8.5ml saline.  5 ml wasted.     Stress Results                                     Maximum Predicted HR:   155 bpm             Target HR: 132 bpm        % Maximum Predicted HR: 85 %                             Stage  DurationHeart Rate  BP                                 (mm:ss)   (bpm)                         Baseline            64    139/64                           Peak   11:27     131    136/43                        Stress Duration:   11:27 mm:ss *                     Maximum Stress HR: 131 bpm *      Procedure  Dobutamine stress echo with two dimensional color and spectral Doppler  performed. Contrast Definity.  ECG Reviewed: Date: 5/17/23 Results:  SB 1st deg AVB  Cath:  Date: Results:      METS/Exercise Tolerance: >4 METS    Hematologic:  - neg hematologic  ROS     Musculoskeletal:  - neg musculoskeletal ROS     GI/Hepatic:  - neg GI/hepatic ROS     Renal/Genitourinary:  - neg Renal ROS     Endo:     (+) type II DM, Last HgA1c: 6.5, date: 5/17/23, thyroid problem, Obesity,     Psychiatric/Substance Use:  - neg psychiatric ROS     Infectious Disease:  - neg infectious disease ROS     Malignancy:   (+) Malignancy, History of Other.Other CA endometrial carcinoma s/p hyst 12/2017 status post Surgery.    Other:  - neg other ROS          Physical Exam    Airway  airway exam normal      Mallampati: II   TM distance: > 3 FB   Neck ROM: full   Mouth opening: > 3 cm    Respiratory Devices and Support         Dental       (+) Removable bridges or other hardware      Cardiovascular   cardiovascular exam normal       Rhythm and rate: regular and normal     Pulmonary   pulmonary exam normal        breath sounds clear to auscultation           OUTSIDE LABS:  CBC:   Lab Results   Component Value Date    WBC 6.8 05/17/2023    WBC 11.4 (H) 12/06/2017    HGB 13.3 05/17/2023    HGB 14.0 12/06/2017    HCT 39.6 05/17/2023    HCT 42.3 12/06/2017     05/17/2023     12/06/2017     BMP:   Lab Results   Component Value Date     05/17/2023     04/06/2023    POTASSIUM 4.9 05/17/2023    POTASSIUM 4.1 04/06/2023    CHLORIDE 107 05/17/2023    CHLORIDE 108 04/06/2023    CO2 22 05/17/2023    CO2 31 04/06/2023    BUN 26.2 (H) 05/17/2023    BUN 37 (H) 04/06/2023    CR 0.93 05/17/2023    CR 0.98 04/06/2023     (H) 05/17/2023     (H) 04/06/2023     COAGS: No results found for: PTT, INR, FIBR  POC:   Lab Results   Component Value Date     (H) 04/06/2018     HEPATIC:   Lab Results   Component Value  Date    ALBUMIN 4.1 04/06/2023    PROTTOTAL 7.8 04/06/2023    ALT 28 04/06/2023    AST 15 04/06/2023    ALKPHOS 62 04/06/2023    BILITOTAL 0.3 04/06/2023     OTHER:   Lab Results   Component Value Date    A1C 6.5 (H) 05/17/2023    COLTEN 10.1 05/17/2023    TSH 0.69 05/17/2023    T4 1.33 12/08/2017       Anesthesia Plan    ASA Status:  2   NPO Status:  NPO Appropriate    Anesthesia Type: General.     - Airway: ETT   Induction: Intravenous.   Maintenance: Balanced.        Consents    Anesthesia Plan(s) and associated risks, benefits, and realistic alternatives discussed. Questions answered and patient/representative(s) expressed understanding.     - Discussed: Risks, Benefits and Alternatives for BOTH SEDATION and the PROCEDURE were discussed     - Discussed with:  Patient      - Extended Intubation/Ventilatory Support Discussed: No.      - Patient is DNR/DNI Status: No    Use of blood products discussed: Yes.     - Discussed with: Patient.     - Consented: consented to blood products            Reason for refusal: other.     Postoperative Care    Pain management: IV analgesics, Multi-modal analgesia.   PONV prophylaxis: Ondansetron (or other 5HT-3), Dexamethasone or Solumedrol, Background Propofol Infusion     Comments:    Other Comments: The risks and benefits of anesthesia, and the alternatives where applicable, have been discussed with the patient, and they wish to proceed.            BERLIN Navarro CRNA

## 2023-05-24 NOTE — DISCHARGE INSTRUCTIONS
[facility]    Home Care Following Hernia Repair    Dr. Williamson    Hernia Type: Incisional    Care of the Incision:  Surgical glue was used, keep your incision dry for 24 hours.  You may shower, but don t submerge under water for at least 2 weeks.  Gently pat your incision dry with a freshly laundered towel.  Do not touch your incision with bare hands or pick at scabs.  Leave your incision open to air.  Cover it only if clothing rubs or irritates it.  Activity:  Gradually increase your activity.  Walk short distances several times each day and increase the distance as your strength allows.  To promote circulation, do not cross your legs while sitting.  No strenuous lifting or straining for 2-3 weeks.   Do not lift anything over 20 pounds for at least 2 weeks.  Return to work will be determined by the type of work you do and should be discussed with your physician.  Do not drive or operate equipment while taking prescription pain medicines.  You may drive 1 week after surgery if you have stopped taking prescription pain medicines and are pain-free enough to react quickly and make an emergency stop if necessary.    Diet:  Return to the diet you were on before surgery.  Drink plenty of  water.  Avoid foods that cause constipation.    REMEMBER--most prescription pain pills cause constipation.  Walking, extra fluids, and increased fiber (fresh fruits and vegetables, etc.) are natural remedies for constipation.  You can also take mineral oil, 1-2 Tablespoons per day.  If still constipated you may try a stool softener such as Colace or Miralax.    Call Your Physician if You Have:  Redness, increased swelling or cloudy drainage from your incision.  A temperature of more than 101 degrees F.  Worsening pain in your incision not relieved by your prescription pain pills and/or a short rest.  Any questions or concerns about your recovery, please call                             Business hours  [phone]    After hours [phone] Nurse Advice Line (24 hours a day)    Follow-up Care:  If appointment not already made, make an appointment 2-3 weeks after your surgery.  Call [phone]

## 2023-05-24 NOTE — BRIEF OP NOTE
[facility]    Brief Operative Note    Pre-operative diagnosis: Incisional hernia, without obstruction or gangrene [K43.2]  Post-operative diagnosis Incarcerated Incisional Hernia    Procedure: Procedure(s):  HERNIORRHAPHY, INCISIONAL, ROBOT-ASSISTED, LAPAROSCOPIC, USING DA JUVENTINO XI  Total defect size 14x8 with Primary defect 10x8 and secondary defect 1x1 cm.  Incarcerated.    Surgeon:   Surgeon(s) and Role:     * Anival Williamson MD - Primary  Anesthesia: General with Block   Estimated Blood Loss: Less than 10 ml    Drains: None  Specimens: * No specimens in log *  Findings:   Total Defect size 14x8 - Primary defect 10x8 cm, secondary 1x1 cm, incarcerated small bowel and omentum.  Complications: None.  Implants:   Implant Name Type Inv. Item Serial No.  Lot No. LRB No. Used Action   MESH SYMBOTEX COMPOSITE STEX 15CM X 10CM [id number] - [id number] Mesh MESH SYMBOTEX COMPOSITE STEX 15CM X 10CM [id number]  [facility] [id number] N/A 1 Implanted       Anival Williamson MD, FACS    #[id number]

## 2023-05-24 NOTE — INTERVAL H&P NOTE
"I have reviewed the surgical (or preoperative) H&P that is linked to this encounter, and examined the patient. There are no significant changes    Clinical Conditions Present on Arrival:  Clinically Significant Risk Factors Present on Admission                 # Drug Induced Platelet Defect: home medication list includes an antiplatelet medication  # DMII: A1C = 6.5 % (Ref range: 0.0 - 5.6 %) within past 6 months  # Obesity: Estimated body mass index is 31.18 kg/m  as calculated from the following:    Height as of 5/17/23: 1.6 m (5' 3\").    Weight as of 5/17/23: 79.8 kg (176 lb).       "

## 2023-06-01 ENCOUNTER — OFFICE VISIT (OUTPATIENT)
Dept: SURGERY | Facility: CLINIC | Age: 63
End: 2023-06-01
Payer: COMMERCIAL

## 2023-06-01 VITALS
WEIGHT: 176 LBS | DIASTOLIC BLOOD PRESSURE: 72 MMHG | TEMPERATURE: 96.2 F | BODY MASS INDEX: 31.18 KG/M2 | SYSTOLIC BLOOD PRESSURE: 118 MMHG

## 2023-06-01 DIAGNOSIS — Z98.890 POST-OPERATIVE STATE: Primary | ICD-10-CM

## 2023-06-01 PROCEDURE — 99024 POSTOP FOLLOW-UP VISIT: CPT | Performed by: SPECIALIST

## 2023-06-01 ASSESSMENT — PAIN SCALES - GENERAL: PAINLEVEL: MILD PAIN (2)

## 2023-06-01 NOTE — PROGRESS NOTES
Follow-up for robotic incisional hernia repair    Subjective:  Patient feels good.  No pain.  She is tolerating a diet without difficulty.      Objective:  B/P: 118/72, T: 96.2, P: Data Unavailable, R: Data Unavailable  Abdomen: Incisions healing well.  Hernia repair intact.      Assessment/plan:  This is 63-year-old a status post a robotically assisted ventral hernia repair.  Doing well.  She will gradually increase her activity as tolerated and follow-up with me as necessary.      Anival Williamson MD, FACS

## 2023-08-17 NOTE — NURSING NOTE
"Oncology Rooming Note    November 20, 2017 9:09 AM   Shelbi Howard is a 57 year old female who presents for:    Chief Complaint   Patient presents with     Oncology Clinic Visit     New Pt Visit, Endometrial Adenocarcinoma Figo Grade 1.     Initial Vitals: /78  Pulse 61  Temp 97.8  F (36.6  C) (Oral)  Resp 18  Ht 1.601 m (5' 3.03\")  Wt 86.3 kg (190 lb 4.8 oz)  SpO2 95%  BMI 33.68 kg/m2 Estimated body mass index is 33.68 kg/(m^2) as calculated from the following:    Height as of this encounter: 1.601 m (5' 3.03\").    Weight as of this encounter: 86.3 kg (190 lb 4.8 oz). Body surface area is 1.96 meters squared.  Data Unavailable Comment: Data Unavailable   No LMP recorded. Patient is postmenopausal.  Allergies reviewed: Yes  Medications reviewed: Yes    Medications: Medication refills not needed today.  Pharmacy name entered into [facility]: [facility] [address] [place] [id number] [facility]    Clinical concerns: High BP. Dr Parekh was notified.    7 minutes for nursing intake (face to face time)     Analilia Munoz LPN              " I have personally seen and examined the patient. I have collaborated with and supervised the

## 2023-10-25 ASSESSMENT — ENCOUNTER SYMPTOMS
CHILLS: 0
ARTHRALGIAS: 0
CONSTIPATION: 0
HEMATOCHEZIA: 0
ABDOMINAL PAIN: 0
SHORTNESS OF BREATH: 0
EYE PAIN: 0
SORE THROAT: 0
DIARRHEA: 0
DIZZINESS: 0
MYALGIAS: 0
HEARTBURN: 0
FREQUENCY: 0
NERVOUS/ANXIOUS: 0
WEAKNESS: 0
JOINT SWELLING: 0
FEVER: 0
PALPITATIONS: 0
DYSURIA: 0
HEADACHES: 0
COUGH: 0
PARESTHESIAS: 0
HEMATURIA: 0
BREAST MASS: 0
NAUSEA: 0

## 2023-10-25 ASSESSMENT — ACTIVITIES OF DAILY LIVING (ADL): CURRENT_FUNCTION: NO ASSISTANCE NEEDED

## 2023-11-01 ENCOUNTER — OFFICE VISIT (OUTPATIENT)
Dept: FAMILY MEDICINE | Facility: CLINIC | Age: 63
End: 2023-11-01
Attending: PHYSICIAN ASSISTANT
Payer: COMMERCIAL

## 2023-11-01 VITALS
RESPIRATION RATE: 18 BRPM | DIASTOLIC BLOOD PRESSURE: 68 MMHG | HEART RATE: 65 BPM | WEIGHT: 178 LBS | BODY MASS INDEX: 31.54 KG/M2 | OXYGEN SATURATION: 98 % | HEIGHT: 63 IN | SYSTOLIC BLOOD PRESSURE: 124 MMHG | TEMPERATURE: 98.7 F

## 2023-11-01 DIAGNOSIS — I10 BENIGN ESSENTIAL HYPERTENSION: ICD-10-CM

## 2023-11-01 DIAGNOSIS — C54.1 ENDOMETRIAL CARCINOMA (H): ICD-10-CM

## 2023-11-01 DIAGNOSIS — Z78.0 POSTMENOPAUSAL ESTROGEN DEFICIENCY: ICD-10-CM

## 2023-11-01 DIAGNOSIS — Z12.31 ENCOUNTER FOR SCREENING MAMMOGRAM FOR MALIGNANT NEOPLASM OF BREAST: ICD-10-CM

## 2023-11-01 DIAGNOSIS — Z00.00 ENCOUNTER FOR MEDICARE ANNUAL WELLNESS EXAM: Primary | ICD-10-CM

## 2023-11-01 DIAGNOSIS — R94.4 DECREASED GFR: ICD-10-CM

## 2023-11-01 DIAGNOSIS — E11.9 TYPE 2 DIABETES MELLITUS WITHOUT COMPLICATION, WITHOUT LONG-TERM CURRENT USE OF INSULIN (H): ICD-10-CM

## 2023-11-01 DIAGNOSIS — Z12.11 SCREEN FOR COLON CANCER: ICD-10-CM

## 2023-11-01 DIAGNOSIS — Z71.6 ENCOUNTER FOR TOBACCO USE CESSATION COUNSELING: ICD-10-CM

## 2023-11-01 LAB
ALBUMIN SERPL BCG-MCNC: 4.4 G/DL (ref 3.5–5.2)
ALP SERPL-CCNC: 58 U/L (ref 35–104)
ALT SERPL W P-5'-P-CCNC: 21 U/L (ref 0–50)
ANION GAP SERPL CALCULATED.3IONS-SCNC: 11 MMOL/L (ref 7–15)
AST SERPL W P-5'-P-CCNC: 19 U/L (ref 0–45)
BILIRUB SERPL-MCNC: 0.2 MG/DL
BUN SERPL-MCNC: 29.4 MG/DL (ref 8–23)
CALCIUM SERPL-MCNC: 9.8 MG/DL (ref 8.8–10.2)
CHLORIDE SERPL-SCNC: 105 MMOL/L (ref 98–107)
CHOLEST SERPL-MCNC: 136 MG/DL
CREAT SERPL-MCNC: 1.04 MG/DL (ref 0.51–0.95)
CREAT UR-MCNC: 89.8 MG/DL
DEPRECATED HCO3 PLAS-SCNC: 24 MMOL/L (ref 22–29)
EGFRCR SERPLBLD CKD-EPI 2021: 59 ML/MIN/1.73M2
ERYTHROCYTE [DISTWIDTH] IN BLOOD BY AUTOMATED COUNT: 13.2 % (ref 10–15)
GLUCOSE SERPL-MCNC: 147 MG/DL (ref 70–99)
HBA1C MFR BLD: 6.6 % (ref 0–5.6)
HCT VFR BLD AUTO: 35 % (ref 35–47)
HDLC SERPL-MCNC: 39 MG/DL
HGB BLD-MCNC: 11.7 G/DL (ref 11.7–15.7)
LDLC SERPL CALC-MCNC: 64 MG/DL
MCH RBC QN AUTO: 31 PG (ref 26.5–33)
MCHC RBC AUTO-ENTMCNC: 33.4 G/DL (ref 31.5–36.5)
MCV RBC AUTO: 93 FL (ref 78–100)
MICROALBUMIN UR-MCNC: <12 MG/L
MICROALBUMIN/CREAT UR: NORMAL MG/G{CREAT}
NONHDLC SERPL-MCNC: 97 MG/DL
PLATELET # BLD AUTO: 231 10E3/UL (ref 150–450)
POTASSIUM SERPL-SCNC: 4.4 MMOL/L (ref 3.4–5.3)
PROT SERPL-MCNC: 7.6 G/DL (ref 6.4–8.3)
RBC # BLD AUTO: 3.78 10E6/UL (ref 3.8–5.2)
SODIUM SERPL-SCNC: 140 MMOL/L (ref 135–145)
TRIGL SERPL-MCNC: 167 MG/DL
WBC # BLD AUTO: 8.4 10E3/UL (ref 4–11)

## 2023-11-01 PROCEDURE — 80061 LIPID PANEL: CPT | Performed by: PHYSICIAN ASSISTANT

## 2023-11-01 PROCEDURE — 83036 HEMOGLOBIN GLYCOSYLATED A1C: CPT | Performed by: PHYSICIAN ASSISTANT

## 2023-11-01 PROCEDURE — 80053 COMPREHEN METABOLIC PANEL: CPT | Performed by: PHYSICIAN ASSISTANT

## 2023-11-01 PROCEDURE — 36415 COLL VENOUS BLD VENIPUNCTURE: CPT | Performed by: PHYSICIAN ASSISTANT

## 2023-11-01 PROCEDURE — G0402 INITIAL PREVENTIVE EXAM: HCPCS | Performed by: PHYSICIAN ASSISTANT

## 2023-11-01 PROCEDURE — 85027 COMPLETE CBC AUTOMATED: CPT | Performed by: PHYSICIAN ASSISTANT

## 2023-11-01 PROCEDURE — 82043 UR ALBUMIN QUANTITATIVE: CPT | Performed by: PHYSICIAN ASSISTANT

## 2023-11-01 PROCEDURE — 99214 OFFICE O/P EST MOD 30 MIN: CPT | Mod: 25 | Performed by: PHYSICIAN ASSISTANT

## 2023-11-01 PROCEDURE — 82570 ASSAY OF URINE CREATININE: CPT | Performed by: PHYSICIAN ASSISTANT

## 2023-11-01 RX ORDER — RESPIRATORY SYNCYTIAL VIRUS VACCINE 120MCG/0.5
0.5 KIT INTRAMUSCULAR ONCE
Qty: 1 EACH | Refills: 0 | Status: CANCELLED | OUTPATIENT
Start: 2023-11-01 | End: 2023-11-01

## 2023-11-01 RX ORDER — SIMVASTATIN 10 MG
10 TABLET ORAL AT BEDTIME
Qty: 90 TABLET | Refills: 3 | Status: SHIPPED | OUTPATIENT
Start: 2023-11-01

## 2023-11-01 RX ORDER — LISINOPRIL/HYDROCHLOROTHIAZIDE 10-12.5 MG
1 TABLET ORAL DAILY
Qty: 90 TABLET | Refills: 1 | Status: SHIPPED | OUTPATIENT
Start: 2023-11-01 | End: 2024-05-03

## 2023-11-01 ASSESSMENT — ENCOUNTER SYMPTOMS
NAUSEA: 0
FEVER: 0
DIZZINESS: 0
ARTHRALGIAS: 0
JOINT SWELLING: 0
PALPITATIONS: 0
COUGH: 0
HEARTBURN: 0
ABDOMINAL PAIN: 0
DIARRHEA: 0
SHORTNESS OF BREATH: 0
FREQUENCY: 0
DYSURIA: 0
HEMATOCHEZIA: 0
HEMATURIA: 0
MYALGIAS: 0
CHILLS: 0
NERVOUS/ANXIOUS: 0
PARESTHESIAS: 0
HEADACHES: 0
EYE PAIN: 0
SORE THROAT: 0
BREAST MASS: 0
CONSTIPATION: 0
WEAKNESS: 0

## 2023-11-01 ASSESSMENT — PAIN SCALES - GENERAL: PAINLEVEL: NO PAIN (0)

## 2023-11-01 ASSESSMENT — ACTIVITIES OF DAILY LIVING (ADL): CURRENT_FUNCTION: NO ASSISTANCE NEEDED

## 2023-11-01 NOTE — PROGRESS NOTES
"SUBJECTIVE:   Shelbi is a 63 year old who presents for Preventive Visit.      11/1/2023     6:59 AM   Additional Questions   Roomed by Jossy Ellison CMA   Accompanied by self         11/1/2023     6:59 AM   Patient Reported Additional Medications   Patient reports taking the following new medications none       Are you in the first 12 months of your Medicare coverage?  Yes,  Visual Acuity:  Right Eye: 20/20   Left Eye: 20/20  Both Eyes: 20/20    Healthy Habits:     In general, how would you rate your overall health?  Excellent    Frequency of exercise:  None    Do you usually eat at least 4 servings of fruit and vegetables a day, include whole grains    & fiber and avoid regularly eating high fat or \"junk\" foods?  Yes    Taking medications regularly:  Yes    Medication side effects:  None    Ability to successfully perform activities of daily living:  No assistance needed    Home Safety:  No safety concerns identified    Hearing Impairment:  No hearing concerns    In the past 6 months, have you been bothered by leaking of urine?  No    In general, how would you rate your overall mental or emotional health?  Excellent      Today's PHQ-2 Score:       11/1/2023     6:50 AM   PHQ-2 ( 1999 [employer])   Q1: Little interest or pleasure in doing things 0   Q2: Feeling down, depressed or hopeless 0   PHQ-2 Score 0   Q1: Little interest or pleasure in doing things Not at all   Q2: Feeling down, depressed or hopeless Not at all   PHQ-2 Score 0           Have you ever done Advance Care Planning? (For example, a Health Directive, POLST, or a discussion with a medical provider or your loved ones about your wishes): No, advance care planning information given to patient to review.  Advanced care planning was discussed at today's visit.       Fall risk  Fallen 2 or more times in the past year?: No  Any fall with injury in the past year?: No    Cognitive Screening   1) Repeat 3 items (Leader, Season, Table)    2) Clock draw: NORMAL  3) " 3 item recall: Recalls 1 object   Results: NORMAL clock, 1-2 items recalled: COGNITIVE IMPAIRMENT LESS LIKELY    Mini-CogTM Copyright CHUY Hui. Licensed by the author for use in [facility]; reprinted with permission ([email or web address]). All rights reserved.      Do you have sleep apnea, excessive snoring or daytime drowsiness? : no    Reviewed and updated as needed this visit by clinical staff   Tobacco  Allergies  Meds              Reviewed and updated as needed this visit by Provider                 Social History     Tobacco Use    Smoking status: Some Days     Packs/day: 0.00     Years: 38.00     Additional pack years: 0.00     Total pack years: 0.00     Types: Cigarettes     Last attempt to quit: 2017     Years since quittin.7     Passive exposure: Never    Smokeless tobacco: Never    Tobacco comments:     smoked 30 years- half pack a day. States that she does not smoke very often anymore (23)- 1 cig per month    Substance Use Topics    Alcohol use: Yes     Comment: social             10/25/2023    10:35 AM   Alcohol Use   Prescreen: >3 drinks/day or >7 drinks/week? No     Do you have a current opioid prescription? No  Do you use any other controlled substances or medications that are not prescribed by a provider? None      Routine Health Maintenance: fasting today   Vaccines - never borrero flu shots. Declines covid today. Will do rsv at her pharmacy   Mammo- 10/19/2022  Colonoscopy- 2018- repeat in 5 yr. Due. Planning to do when has time off in 2024.     Diabetes  Medications: metformin  Last a1c: 6.5% from May 2023  Blood sugars: running  and am fasting 120s.    Hypoglycemia: no   Exercise: walking- in summer. Not doing in cold weather/winter.   Weight:  stable   ACEi/ARB: lisinopril  Statin: zocor  Aspirin: yes  Eye exam: eye exam - 03/15/2023- [facility] -  No Diabetic retinopathy was detected  Foot concerns/exam: same hurt. Not numbness or tingling.       Still working- - commercial /industrial.      Tobacco use- smokes 2 cig when bowling once weekly. Does not want help to quit. Just have to decide to do it.    S/p hysterectomy for endometrial cancer- 12/05/2017.   No radiation or chemotherapy   Denies vaginal bleeding, discharge, pelvic pain.  Last Gyn Onc visit- 09/09/2019- Michelle Clark NP- Annual pelvic exam advised (ok to do with primary care), surveillance paps NOT recommended.        Current providers sharing in care for this patient include:   Patient Care Team:  No Ref-Primary, Physician as PCP - Tricia Freeman PA-C as Assigned PCP  Anival Williamson MD as Assigned Heart and Vascular Provider  Dayton Barriga MD as MD (Cardiovascular Disease)    The following health maintenance items are reviewed in Epic and correct as of today:  Health Maintenance   Topic Date Due    NICOTINE/TOBACCO CESSATION COUNSELING Q 1 YR  Never done    DEXA  Never done    LUNG CANCER SCREENING  Never done    HEPATITIS B IMMUNIZATION (1 of 3 - Risk 3-dose series) Never done    RSV VACCINE 60+ (1 - 1-dose 60+ series) Never done    COLORECTAL CANCER SCREENING  04/06/2023    INFLUENZA VACCINE (1) 09/01/2023    COVID-19 Vaccine (5 - 2023-24 season) 09/01/2023    ANNUAL REVIEW OF HM ORDERS  10/05/2023    ADVANCE CARE PLANNING  10/09/2023    MEDICARE ANNUAL WELLNESS VISIT  10/05/2023    A1C  11/17/2023    EYE EXAM  03/15/2024    LIPID  04/06/2024    MICROALBUMIN  04/06/2024    DIABETIC FOOT EXAM  04/06/2024    BMP  05/17/2024    MAMMO SCREENING  10/19/2024    FALL RISK ASSESSMENT  11/01/2024    DTAP/TDAP/TD IMMUNIZATION (2 - Td or Tdap) 03/26/2028    HEPATITIS C SCREENING  Completed    HIV SCREENING  Completed    PHQ-2 (once per calendar year)  Completed    Pneumococcal Vaccine: 65+ Years  Completed    ZOSTER IMMUNIZATION  Completed    IPV IMMUNIZATION  Aged Out    HPV IMMUNIZATION  Aged Out    MENINGITIS IMMUNIZATION  Aged Out    RSV MONOCLONAL  ANTIBODY  Aged Out    PAP  Discontinued     Lab work is in process  Labs reviewed in EPIC  BP Readings from Last 3 Encounters:   23 124/68   23 118/72   23 (!) 157/71    Wt Readings from Last 3 Encounters:   23 80.7 kg (178 lb)   23 79.8 kg (176 lb)   23 79.8 kg (176 lb)                  Patient Active Problem List   Diagnosis    ASCUS of cervix with negative high risk HPV    Endometrial carcinoma (H)- hyst 2017    S/P hysterectomy    Type 2 diabetes mellitus without complication, without long-term current use of insulin (H)    History of Graves' disease     Past Surgical History:   Procedure Laterality Date    ABDOMEN SURGERY      CYSTOSCOPY N/A 2017    Procedure: CYSTOSCOPY;;  Surgeon: Facundo Parekh MD;  Location: [facility] OR    DAVINCI HYSTERECTOMY TOTAL, BILATERAL SALPINGO-OOPHORECTOMY, NODE DISSECTION, COMBINED N/A 2017    Procedure: COMBINED DAVINCI HYSTERECTOMY TOTAL, SALPINGO-OOPHORECTOMY, NODE DISSECTION;  DaVinci Assisted Total Laparoscopic Hysterectomy, Right Salpingo-Oophorectomy, Lysis of Adhesions, Cystoscopy;  Surgeon: Facundo Parekh MD;  Location: [facility] OR    HERNIORRHAPHY, INCISIONAL, ROBOT-ASSISTED, LAPAROSCOPIC, USING DA JUVENTINO XI N/A 2023    Procedure: HERNIORRHAPHY, INCISIONAL, ROBOT-ASSISTED, LAPAROSCOPIC, USING DA JUVENTINO XI;  Surgeon: Anival Williamson MD;  Location: [facility] OR    HYSTERECTOMY, PAP NO LONGER INDICATED      LAPAROSCOPIC HYSTERECTOMY TOTAL  2017    oopherectomy Left     13 lb benign mass removal    ORTHOPEDIC SURGERY         Social History     Tobacco Use    Smoking status: Some Days     Packs/day: 0.50     Years: 30.00     Additional pack years: 0.00     Total pack years: 15.00     Types: Cigarettes     Last attempt to quit: 2017     Years since quittin.7     Passive exposure: Never    Smokeless tobacco: Never    Tobacco comments:     smoked 30 years- half pack a day. States that she does not smoke very often  anymore (5/16/23)- 1 cig per month    Substance Use Topics    Alcohol use: Yes     Comment: social     Family History   Problem Relation Age of Onset    Hypertension Mother     Hyperlipidemia Mother     Cerebrovascular Disease Mother     Macular Degeneration Mother     Influenza/Pneumonia Father 62    Diabetes Father     Kidney Disease Sister     Diabetes Sister     Depression Sister     Diabetes Brother     Diabetes Brother     Colon Cancer Brother 64         Current Outpatient Medications   Medication Sig Dispense Refill    alcohol swab prep pads Use to swab area of injection/jennifer as directed. 100 each 0    aspirin 81 MG EC tablet Take 1 tablet (81 mg) by mouth daily 90 tablet 3    blood glucose (NO BRAND SPECIFIED) lancets standard Use to test blood sugar 1 times daily or as directed. 100 lancet. 5    blood glucose (NO BRAND SPECIFIED) test strip Use to test blood sugar 1 times daily or as directed. 100 strip 5    blood glucose monitoring (NO BRAND SPECIFIED) meter device kit Use to test blood sugar 1 times daily or as directed. 1 kit 0    lisinopril-hydrochlorothiazide (ZESTORETIC) 10-12.5 MG tablet Take 1 tablet by mouth daily 90 tablet 1    metFORMIN (GLUCOPHAGE) 1000 MG tablet Take 1 tablet (1,000 mg) by mouth 2 times daily (with meals) 180 tablet 1    simvastatin (ZOCOR) 10 MG tablet Take 1 tablet (10 mg) by mouth at bedtime 90 tablet 3     Allergies   Allergen Reactions    Latex Itching             6/7/2021     9:26 AM 5/10/2023     9:20 AM   Breast CA Risk Assessment (FHS-7)   Do you have a family history of breast, colon, or ovarian cancer? Yes No / Unknown         Mammogram Screening: Recommended annual mammography  Pertinent mammograms are reviewed under the imaging tab.    Review of Systems   Constitutional:  Negative for chills and fever.   HENT:  Negative for congestion, ear pain, hearing loss and sore throat.    Eyes:  Negative for pain and visual disturbance.   Respiratory:  Negative for cough and  "shortness of breath.    Cardiovascular:  Negative for chest pain, palpitations and peripheral edema.   Gastrointestinal:  Negative for abdominal pain, constipation, diarrhea, heartburn, hematochezia and nausea.   Breasts:  Negative for tenderness, breast mass and discharge.   Genitourinary:  Negative for dysuria, frequency, genital sores, hematuria, pelvic pain, urgency, vaginal bleeding and vaginal discharge.   Musculoskeletal:  Negative for arthralgias, joint swelling and myalgias.   Skin:  Negative for rash.   Neurological:  Negative for dizziness, weakness, headaches and paresthesias.   Psychiatric/Behavioral:  Negative for mood changes. The patient is not nervous/anxious.        OBJECTIVE:   /68   Pulse 65   Temp 98.7  F (37.1  C) (Temporal)   Resp 18   Ht 1.594 m (5' 2.76\")   Wt 80.7 kg (178 lb)   LMP  (LMP Unknown)   SpO2 98%   Breastfeeding No   BMI 31.78 kg/m   Estimated body mass index is 31.78 kg/m  as calculated from the following:    Height as of this encounter: 1.594 m (5' 2.76\").    Weight as of this encounter: 80.7 kg (178 lb).  Physical Exam  GENERAL APPEARANCE: healthy, alert and no distress  EYES: Eyes grossly normal to inspection, PERRL and conjunctivae and sclerae normal  HENT: ear canals and TM's normal, nose and mouth without ulcers or lesions, oropharynx clear and oral mucous membranes moist  NECK: no adenopathy, no asymmetry, masses, or scars and thyroid normal to palpation  RESP: lungs clear to auscultation - no rales, rhonchi or wheezes  BREAST: normal without masses, tenderness or nipple discharge and no palpable axillary masses or adenopathy  CV: regular rate and rhythm, normal S1 S2, no S3 or S4, no murmur, click or rub, no peripheral edema and peripheral pulses strong  ABDOMEN: soft, nontender, no hepatosplenomegaly, no masses and bowel sounds normal   (female): normal female external genitalia, normal urethral meatus, vaginal mucosal atrophy noted, posterior wall " bulge into introitus and normal post-hysterectomy exam without masses.   MS: no musculoskeletal defects are noted and gait is age appropriate without ataxia  SKIN: no suspicious lesions or rashes  NEURO: Normal strength and tone, sensory exam grossly normal, mentation intact and speech normal  PSYCH: mentation appears normal and affect normal/bright    Diagnostic Test Results:  Labs reviewed in Epic  Results for orders placed or performed in visit on 11/01/23   Hemoglobin A1c     Status: Abnormal   Result Value Ref Range    Hemoglobin A1C 6.6 (H) 0.0 - 5.6 %       ASSESSMENT / PLAN:       ICD-10-CM    1. Encounter for Medicare annual wellness exam  Z00.00 CBC with platelets     CBC with platelets      2. Type 2 diabetes mellitus without complication, without long-term current use of insulin (H)  E11.9 PRIMARY CARE FOLLOW-UP SCHEDULING     lisinopril-hydrochlorothiazide (ZESTORETIC) 10-12.5 MG tablet     metFORMIN (GLUCOPHAGE) 1000 MG tablet     Hemoglobin A1c     Comprehensive metabolic panel     Lipid panel reflex to direct LDL Fasting     Albumin Random Urine Quantitative with Creat Ratio     simvastatin (ZOCOR) 10 MG tablet     Hemoglobin A1c     Comprehensive metabolic panel     Lipid panel reflex to direct LDL Fasting     Albumin Random Urine Quantitative with Creat Ratio      3. Benign essential hypertension  I10 lisinopril-hydrochlorothiazide (ZESTORETIC) 10-12.5 MG tablet      4. Endometrial carcinoma (H)- hyst 12/2017  C54.1       5. Postmenopausal estrogen deficiency  Z78.0 DX Hip/Pelvis/Spine      6. Screen for colon cancer  Z12.11 Colonoscopy Screening  Referral      7. Encounter for screening mammogram for malignant neoplasm of breast  Z12.31 *MA Screening Digital Bilateral      8. Encounter for tobacco use cessation counseling  Z71.6         1. Encounter for Medicare annual wellness exam  Reviewed VS and weight/BMI with pt   Immunizations: see discussion in HPI above or orders. Discussed some  insurance plans cover vaccines through the pharmacy benefit (ie Shingrix, Tdap)  Counseling see below   Health screenings/maintenance per orders below  Counseled on appropriate cancer screenings   Heart healthy exercise and eating habits discussed  Does not qualify for lung cancer screening  - CBC with platelets; Future  - CBC with platelets    2. Type 2 diabetes mellitus without complication, without long-term current use of insulin (H)  Stable controlled- A1c 6.6%  Labs- see orders/below.  Eye exam- UTD. will be due March 2024.   Foot exam- UTD - due in 6 mo   Meds- reordered.   Advised consistent exercise and carb controlled eating  Advised tobacco cessation  Repeat visit in 6 mo  - PRIMARY CARE FOLLOW-UP SCHEDULING  - lisinopril-hydrochlorothiazide (ZESTORETIC) 10-12.5 MG tablet; Take 1 tablet by mouth daily  Dispense: 90 tablet; Refill: 1  - metFORMIN (GLUCOPHAGE) 1000 MG tablet; Take 1 tablet (1,000 mg) by mouth 2 times daily (with meals)  Dispense: 180 tablet; Refill: 1  - Hemoglobin A1c; Future  - Comprehensive metabolic panel; Future  - Lipid panel reflex to direct LDL Fasting; Future  - Albumin Random Urine Quantitative with Creat Ratio; Future  - simvastatin (ZOCOR) 10 MG tablet; Take 1 tablet (10 mg) by mouth at bedtime  Dispense: 90 tablet; Refill: 3  - Hemoglobin A1c  - Comprehensive metabolic panel  - Lipid panel reflex to direct LDL Fasting  - Albumin Random Urine Quantitative with Creat Ratio    3. Benign essential hypertension  Stable, sx well controlled, tolerates medication(s) without side effects. Refilled x 1yr  - lisinopril-hydrochlorothiazide (ZESTORETIC) 10-12.5 MG tablet; Take 1 tablet by mouth daily  Dispense: 90 tablet; Refill: 1    4. Endometrial carcinoma (H)- hyst 12/2017  Last note from gyn/onc 2019-  vaginal cuff paps NOT recommended.   Surveillance with annual pelvic exams.  Exam completed today- normal.     5. Postmenopausal estrogen deficiency  Counseled on osteoporosis, RF, and  "dexa screening. She will consider. Order placed   - DX Hip/Pelvis/Spine; Future    6. Screen for colon cancer  Discussed, ordered. Pt planning for when has time off Feb 2024  - Colonoscopy Screening  Referral; Future    7. Encounter for screening mammogram for malignant neoplasm of breast  Discussed screening; advised and ordered mammogram. Pt given info to schedule.   - *MA Screening Digital Bilateral; Future    8. Encounter for tobacco use cessation counseling  Pt smokes 2 cig per week. She does not need assistance in quitting. Feels she just needs to make decision to quit. Encouraged cessation     Patient has been advised of split billing requirements and indicates understanding: Yes      COUNSELING:  Reviewed preventive health counseling, as reflected in patient instructions       Regular exercise       Healthy diet/nutrition       Vision screening       Osteoporosis prevention/bone health       Colon cancer screening       Mammogram, vaccines      BMI:   Estimated body mass index is 31.78 kg/m  as calculated from the following:    Height as of this encounter: 1.594 m (5' 2.76\").    Weight as of this encounter: 80.7 kg (178 lb).   Weight management plan: Discussed healthy diet and exercise guidelines      She reports that she has been smoking cigarettes. She has never been exposed to tobacco smoke. She has never used smokeless tobacco.  Nicotine/Tobacco Cessation Plan:   Information offered: Patient not interested at this time      Appropriate preventive services were discussed with this patient, including applicable screening as appropriate for fall prevention, nutrition, physical activity, Tobacco-use cessation, weight loss and cognition.  Checklist reviewing preventive services available has been given to the patient.    Reviewed patients plan of care and provided an AVS. The Intermediate Care Plan ( asthma action plan, low back pain action plan, and migraine action plan) for Shelbi meets the Care " Plan requirement. This Care Plan has been established and reviewed with the Patient.          Tricia Baxter PA-C  [facility]    Identified Health Risks:  I have reviewed Opioid Use Disorder and Substance Use Disorder risk factors and made any needed referrals.   She is at risk for lack of exercise and has been provided with information to increase physical activity for the benefit of her well-being.  She is at risk for lack of exercise and has been provided with information to increase physical activity for the benefit of her well-being.

## 2023-11-01 NOTE — PATIENT INSTRUCTIONS
"Patient Education   Personalized Prevention Plan  You are due for the preventive services outlined below.  Your care team is available to assist you in scheduling these services.  If you have already completed any of these items, please share that information with your care team to update in your medical record.  Health Maintenance Due   Topic Date Due     Osteoporosis Screening  Never done     LUNG CANCER SCREENING  Never done     Hepatitis B Vaccine (1 of 3 - Risk 3-dose series) Never done     RSV VACCINE 60+ (1 - 1-dose 60+ series) Never done     Colorectal Cancer Screening  04/06/2023     Flu Vaccine (1) 09/01/2023     COVID-19 Vaccine (5 - 2023-24 season) 09/01/2023     Annual Wellness Visit  10/05/2023     Learning About Being Physically Active  What is physical activity?     Being physically active means doing any kind of activity that gets your body moving.  The types of physical activity that can help you get fit and stay healthy include:  Aerobic or \"cardio\" activities. These make your heart beat faster and make you breathe harder, such as brisk walking, riding a bike, or running. They strengthen your heart and lungs and build up your endurance.  Strength training activities. These make your muscles work against, or \"resist,\" something. Examples include lifting weights or doing push-ups. These activities help tone and strengthen your muscles and bones.  Stretches. These let you move your joints and muscles through their full range of motion. Stretching helps you be more flexible.  Reaching a balance between these three types of physical activity is important because each one contributes to your overall fitness.  What are the benefits of being active?  Being active is one of the best things you can do for your health. It helps you to:  Feel stronger and have more energy to do all the things you like to do.  Focus better at school or work.  Feel, think, and sleep better.  Reach and stay at a healthy " "weight.  Lose fat and build lean muscle.  Lower your risk for serious health problems, including diabetes, heart attack, high blood pressure, and some cancers.  Keep your heart, lungs, bones, muscles, and joints strong and healthy.  How can you make being active part of your life?  Start slowly. Make it your long-term goal to get at least 30 minutes of exercise on most days of the week. Walking is a good choice. You also may want to do other activities, such as running, swimming, cycling, or playing tennis or team sports.  Pick activities that you like--ones that make your heart beat faster, your muscles stronger, and your muscles and joints more flexible. If you find more than one thing you like doing, do them all. You don't have to do the same thing every day.  Get your heart pumping every day. Any activity that makes your heart beat faster and keeps it at that rate for a while counts.  Here are some great ways to get your heart beating faster:  Go for a brisk walk, run, or bike ride.  Go for a hike or swim.  Go in-line skating.  Play a game of touch football, basketball, or soccer.  Ride a bike.  Play tennis or racquetball.  Climb stairs.  Even some household chores can be aerobic--just do them at a faster pace. Vacuuming, raking or mowing the lawn, sweeping the garage, and washing and waxing the car all can help get your heart rate up.  Strengthen your muscles during the week. You don't have to lift heavy weights or grow big, bulky muscles to get stronger. Doing a few simple activities that make your muscles work against, or \"resist,\" something can help you get stronger.  For example, you can:  Do push-ups or sit-ups, which use your own body weight as resistance.  Lift weights or dumbbells or use stretch bands at home or in a gym or community center.  Stretch your muscles often. Stretching will help you as you become more active. It can help you stay flexible, loosen tight muscles, and avoid injury. It can also " "help improve your balance and posture and can be a great way to relax.  Be sure to stretch the muscles you'll be using when you work out. It's best to warm your muscles slightly before you stretch them. Walk or do some other light aerobic activity for a few minutes, and then start stretching.  When you stretch your muscles:  Do it slowly. Stretching is not about going fast or making sudden movements.  Don't push or bounce during a stretch.  Hold each stretch for at least 15 to 30 seconds, if you can. You should feel a stretch in the muscle, but not pain.  Breathe out as you do the stretch. Then breathe in as you hold the stretch. Don't hold your breath.  If you're worried about how more activity might affect your health, have a checkup before you start. Follow any special advice your doctor gives you for getting a smart start.  Where can you learn more?  Go to https://www.Thinkr.[employer]/patiented  Enter W332 in the search box to learn more about \"Learning About Being Physically Active.\"  Current as of: October 10, 2022               Content Version: 13.7    4081-3200 [employer]bender.   Care instructions adapted under license by your healthcare professional. If you have questions about a medical condition or this instruction, always ask your healthcare professional. [employer] disclaims any warranty or liability for your use of this information.         Patient Education   Personalized Prevention Plan  You are due for the preventive services outlined below.  Your care team is available to assist you in scheduling these services.  If you have already completed any of these items, please share that information with your care team to update in your medical record.  Health Maintenance Due   Topic Date Due     Osteoporosis Screening  Never done     LUNG CANCER SCREENING  Never done     Hepatitis B Vaccine (1 of 3 - Risk 3-dose series) Never done     RSV VACCINE 60+ (1 - 1-dose 60+ series) Never done " "    Colorectal Cancer Screening  04/06/2023     Flu Vaccine (1) 09/01/2023     COVID-19 Vaccine (5 - 2023-24 season) 09/01/2023     Annual Wellness Visit  10/05/2023     Learning About Being Physically Active  What is physical activity?     Being physically active means doing any kind of activity that gets your body moving.  The types of physical activity that can help you get fit and stay healthy include:  Aerobic or \"cardio\" activities. These make your heart beat faster and make you breathe harder, such as brisk walking, riding a bike, or running. They strengthen your heart and lungs and build up your endurance.  Strength training activities. These make your muscles work against, or \"resist,\" something. Examples include lifting weights or doing push-ups. These activities help tone and strengthen your muscles and bones.  Stretches. These let you move your joints and muscles through their full range of motion. Stretching helps you be more flexible.  Reaching a balance between these three types of physical activity is important because each one contributes to your overall fitness.  What are the benefits of being active?  Being active is one of the best things you can do for your health. It helps you to:  Feel stronger and have more energy to do all the things you like to do.  Focus better at school or work.  Feel, think, and sleep better.  Reach and stay at a healthy weight.  Lose fat and build lean muscle.  Lower your risk for serious health problems, including diabetes, heart attack, high blood pressure, and some cancers.  Keep your heart, lungs, bones, muscles, and joints strong and healthy.  How can you make being active part of your life?  Start slowly. Make it your long-term goal to get at least 30 minutes of exercise on most days of the week. Walking is a good choice. You also may want to do other activities, such as running, swimming, cycling, or playing tennis or team sports.  Pick activities that you " "like--ones that make your heart beat faster, your muscles stronger, and your muscles and joints more flexible. If you find more than one thing you like doing, do them all. You don't have to do the same thing every day.  Get your heart pumping every day. Any activity that makes your heart beat faster and keeps it at that rate for a while counts.  Here are some great ways to get your heart beating faster:  Go for a brisk walk, run, or bike ride.  Go for a hike or swim.  Go in-line skating.  Play a game of touch football, basketball, or soccer.  Ride a bike.  Play tennis or racquetball.  Climb stairs.  Even some household chores can be aerobic--just do them at a faster pace. Vacuuming, raking or mowing the lawn, sweeping the garage, and washing and waxing the car all can help get your heart rate up.  Strengthen your muscles during the week. You don't have to lift heavy weights or grow big, bulky muscles to get stronger. Doing a few simple activities that make your muscles work against, or \"resist,\" something can help you get stronger.  For example, you can:  Do push-ups or sit-ups, which use your own body weight as resistance.  Lift weights or dumbbells or use stretch bands at home or in a gym or community center.  Stretch your muscles often. Stretching will help you as you become more active. It can help you stay flexible, loosen tight muscles, and avoid injury. It can also help improve your balance and posture and can be a great way to relax.  Be sure to stretch the muscles you'll be using when you work out. It's best to warm your muscles slightly before you stretch them. Walk or do some other light aerobic activity for a few minutes, and then start stretching.  When you stretch your muscles:  Do it slowly. Stretching is not about going fast or making sudden movements.  Don't push or bounce during a stretch.  Hold each stretch for at least 15 to 30 seconds, if you can. You should feel a stretch in the muscle, but not " "pain.  Breathe out as you do the stretch. Then breathe in as you hold the stretch. Don't hold your breath.  If you're worried about how more activity might affect your health, have a checkup before you start. Follow any special advice your doctor gives you for getting a smart start.  Where can you learn more?  Go to https://www.Pipette.net/patiented  Enter W332 in the search box to learn more about \"Learning About Being Physically Active.\"  Current as of: October 10, 2022               Content Version: 13.7    0788-4897 [employer].   Care instructions adapted under license by your healthcare professional. If you have questions about a medical condition or this instruction, always ask your healthcare professional. [employer] disclaims any warranty or liability for your use of this information.         "

## 2023-12-22 ENCOUNTER — MEDICAL CORRESPONDENCE (OUTPATIENT)
Dept: HEALTH INFORMATION MANAGEMENT | Facility: CLINIC | Age: 63
End: 2023-12-22

## 2024-04-22 ENCOUNTER — PATIENT OUTREACH (OUTPATIENT)
Dept: GASTROENTEROLOGY | Facility: CLINIC | Age: 64
End: 2024-04-22
Payer: COMMERCIAL

## 2024-05-03 ENCOUNTER — TELEPHONE (OUTPATIENT)
Dept: FAMILY MEDICINE | Facility: CLINIC | Age: 64
End: 2024-05-03
Payer: COMMERCIAL

## 2024-05-03 DIAGNOSIS — E11.9 TYPE 2 DIABETES MELLITUS WITHOUT COMPLICATION, WITHOUT LONG-TERM CURRENT USE OF INSULIN (H): ICD-10-CM

## 2024-05-03 DIAGNOSIS — I10 BENIGN ESSENTIAL HYPERTENSION: ICD-10-CM

## 2024-05-03 RX ORDER — LISINOPRIL/HYDROCHLOROTHIAZIDE 10-12.5 MG
1 TABLET ORAL DAILY
Qty: 30 TABLET | Refills: 0 | Status: SHIPPED | OUTPATIENT
Start: 2024-05-03 | End: 2024-06-12

## 2024-05-03 NOTE — TELEPHONE ENCOUNTER
Due for 6 mo visit diabetes - face to face and labs.  Refilling 30 days.   Please schedule  Tricia Baxter PA-C

## 2024-05-04 ENCOUNTER — HEALTH MAINTENANCE LETTER (OUTPATIENT)
Age: 64
End: 2024-05-04

## 2024-05-10 ENCOUNTER — TRANSFERRED RECORDS (OUTPATIENT)
Dept: MULTI SPECIALTY CLINIC | Facility: CLINIC | Age: 64
End: 2024-05-10

## 2024-05-10 LAB — RETINOPATHY: NORMAL

## 2024-06-12 ENCOUNTER — TRANSFERRED RECORDS (OUTPATIENT)
Dept: HEALTH INFORMATION MANAGEMENT | Facility: CLINIC | Age: 64
End: 2024-06-12

## 2024-06-12 ENCOUNTER — OFFICE VISIT (OUTPATIENT)
Dept: FAMILY MEDICINE | Facility: CLINIC | Age: 64
End: 2024-06-12
Payer: COMMERCIAL

## 2024-06-12 VITALS
RESPIRATION RATE: 18 BRPM | BODY MASS INDEX: 31.56 KG/M2 | DIASTOLIC BLOOD PRESSURE: 76 MMHG | WEIGHT: 178.13 LBS | SYSTOLIC BLOOD PRESSURE: 132 MMHG | OXYGEN SATURATION: 99 % | TEMPERATURE: 97.5 F | HEART RATE: 60 BPM | HEIGHT: 63 IN

## 2024-06-12 DIAGNOSIS — F17.200 TOBACCO USE DISORDER: ICD-10-CM

## 2024-06-12 DIAGNOSIS — R20.2 NUMBNESS AND TINGLING OF BOTH FEET: ICD-10-CM

## 2024-06-12 DIAGNOSIS — Z12.11 SCREEN FOR COLON CANCER: ICD-10-CM

## 2024-06-12 DIAGNOSIS — C54.1 ENDOMETRIAL CARCINOMA (H): ICD-10-CM

## 2024-06-12 DIAGNOSIS — R20.0 NUMBNESS AND TINGLING OF BOTH FEET: ICD-10-CM

## 2024-06-12 DIAGNOSIS — E11.9 TYPE 2 DIABETES MELLITUS WITHOUT COMPLICATION, WITHOUT LONG-TERM CURRENT USE OF INSULIN (H): Primary | ICD-10-CM

## 2024-06-12 DIAGNOSIS — I10 BENIGN ESSENTIAL HYPERTENSION: ICD-10-CM

## 2024-06-12 LAB
ANION GAP SERPL CALCULATED.3IONS-SCNC: 12 MMOL/L (ref 7–15)
BASOPHILS # BLD AUTO: 0 10E3/UL (ref 0–0.2)
BASOPHILS NFR BLD AUTO: 0 %
BUN SERPL-MCNC: 37.2 MG/DL (ref 8–23)
CALCIUM SERPL-MCNC: 9.7 MG/DL (ref 8.8–10.2)
CHLORIDE SERPL-SCNC: 108 MMOL/L (ref 98–107)
CREAT SERPL-MCNC: 1.19 MG/DL (ref 0.51–0.95)
DEPRECATED HCO3 PLAS-SCNC: 24 MMOL/L (ref 22–29)
EGFRCR SERPLBLD CKD-EPI 2021: 50 ML/MIN/1.73M2
EOSINOPHIL # BLD AUTO: 0.5 10E3/UL (ref 0–0.7)
EOSINOPHIL NFR BLD AUTO: 7 %
ERYTHROCYTE [DISTWIDTH] IN BLOOD BY AUTOMATED COUNT: 12.9 % (ref 10–15)
GLUCOSE SERPL-MCNC: 138 MG/DL (ref 70–99)
HBA1C MFR BLD: 6.4 % (ref 0–5.6)
HCT VFR BLD AUTO: 34.8 % (ref 35–47)
HGB BLD-MCNC: 11.7 G/DL (ref 11.7–15.7)
IMM GRANULOCYTES # BLD: 0 10E3/UL
IMM GRANULOCYTES NFR BLD: 0 %
LYMPHOCYTES # BLD AUTO: 1.6 10E3/UL (ref 0.8–5.3)
LYMPHOCYTES NFR BLD AUTO: 23 %
MCH RBC QN AUTO: 31.4 PG (ref 26.5–33)
MCHC RBC AUTO-ENTMCNC: 33.6 G/DL (ref 31.5–36.5)
MCV RBC AUTO: 93 FL (ref 78–100)
MONOCYTES # BLD AUTO: 0.5 10E3/UL (ref 0–1.3)
MONOCYTES NFR BLD AUTO: 8 %
NEUTROPHILS # BLD AUTO: 4.5 10E3/UL (ref 1.6–8.3)
NEUTROPHILS NFR BLD AUTO: 63 %
PLATELET # BLD AUTO: 191 10E3/UL (ref 150–450)
POTASSIUM SERPL-SCNC: 4.6 MMOL/L (ref 3.4–5.3)
RBC # BLD AUTO: 3.73 10E6/UL (ref 3.8–5.2)
RETINOPATHY: NEGATIVE
SODIUM SERPL-SCNC: 144 MMOL/L (ref 135–145)
TSH SERPL DL<=0.005 MIU/L-ACNC: 1.13 UIU/ML (ref 0.3–4.2)
VIT B12 SERPL-MCNC: 997 PG/ML (ref 232–1245)
WBC # BLD AUTO: 7.1 10E3/UL (ref 4–11)

## 2024-06-12 PROCEDURE — 80048 BASIC METABOLIC PNL TOTAL CA: CPT | Performed by: PHYSICIAN ASSISTANT

## 2024-06-12 PROCEDURE — 36415 COLL VENOUS BLD VENIPUNCTURE: CPT | Performed by: PHYSICIAN ASSISTANT

## 2024-06-12 PROCEDURE — 99207 PR FOOT EXAM NO CHARGE: CPT | Performed by: PHYSICIAN ASSISTANT

## 2024-06-12 PROCEDURE — 99214 OFFICE O/P EST MOD 30 MIN: CPT | Performed by: PHYSICIAN ASSISTANT

## 2024-06-12 PROCEDURE — 83036 HEMOGLOBIN GLYCOSYLATED A1C: CPT | Performed by: PHYSICIAN ASSISTANT

## 2024-06-12 PROCEDURE — 82607 VITAMIN B-12: CPT | Performed by: PHYSICIAN ASSISTANT

## 2024-06-12 PROCEDURE — 85025 COMPLETE CBC W/AUTO DIFF WBC: CPT | Performed by: PHYSICIAN ASSISTANT

## 2024-06-12 PROCEDURE — 84443 ASSAY THYROID STIM HORMONE: CPT | Performed by: PHYSICIAN ASSISTANT

## 2024-06-12 RX ORDER — LISINOPRIL/HYDROCHLOROTHIAZIDE 10-12.5 MG
1 TABLET ORAL DAILY
Qty: 90 TABLET | Refills: 1 | Status: SHIPPED | OUTPATIENT
Start: 2024-06-12

## 2024-06-12 RX ORDER — RESPIRATORY SYNCYTIAL VIRUS VACCINE 120MCG/0.5
0.5 KIT INTRAMUSCULAR ONCE
Qty: 1 EACH | Refills: 0 | Status: CANCELLED | OUTPATIENT
Start: 2024-06-12 | End: 2024-06-12

## 2024-06-12 RX ORDER — GABAPENTIN 100 MG/1
100-300 CAPSULE ORAL 3 TIMES DAILY
Qty: 90 CAPSULE | Refills: 1 | Status: SHIPPED | OUTPATIENT
Start: 2024-06-12 | End: 2024-06-28

## 2024-06-12 ASSESSMENT — PAIN SCALES - GENERAL: PAINLEVEL: NO PAIN (0)

## 2024-06-12 NOTE — PROGRESS NOTES
Assessment & Plan     Type 2 diabetes mellitus without complication, without long-term current use of insulin (H)  Stable/controlled continue current meds and plan. A1c 6.4%  Meds- refilled.   Labs- see orders/below.  Eye exam- UTD. She brought report, will be scanned.   Foot exam- done today   Advised lifestyle management-carb controlled eating and consistent exercise/active lifestyle  Diabetes Medication(s)       Biguanides       metFORMIN (GLUCOPHAGE) 1000 MG tablet Take 1 tablet (1,000 mg) by mouth 2 times daily (with meals)           - HEMOGLOBIN A1C; Future  - FOOT EXAM  - lisinopril-hydrochlorothiazide (ZESTORETIC) 10-12.5 MG tablet; Take 1 tablet by mouth daily  - metFORMIN (GLUCOPHAGE) 1000 MG tablet; Take 1 tablet (1,000 mg) by mouth 2 times daily (with meals)  - Basic metabolic panel  (Ca, Cl, CO2, Creat, Gluc, K, Na, BUN); Future    Benign essential hypertension  Blood pressures at goal. Tolerating medications well, refilled.   Labs per orders if due.  Advised DASH diet and heart healthy eating plan, regular exercise, wt management, and limited alcohol advised  - lisinopril-hydrochlorothiazide (ZESTORETIC) 10-12.5 MG tablet; Take 1 tablet by mouth daily    Numbness and tingling of both feet  Dorsum of feet only, bilat. Symmetric. No radicular s/sx  Normal foot exam, normal monofilament  Diabetes is well controlled.   Screen labs below  Trial gabapentin qhs.  - Vitamin B12; Future  - TSH with free T4 reflex; Future  - gabapentin (NEURONTIN) 100 MG capsule; Take 1-3 capsules (100-300 mg) by mouth 3 times daily  - CBC with Platelets & Differential; Future    Endometrial carcinoma (H)  Pelvic exam annually, last Nov 2023 with wellness. No bleeding or GYN concerns.     Screen for colon cancer  She is aware she is due for scope. Re-ordered.   - Colonoscopy Screening  Referral; Future          BMI  Estimated body mass index is 31.81 kg/m  as calculated from the following:    Height as of this  "encounter: 1.594 m (5' 2.75\").    Weight as of this encounter: 80.8 kg (178 lb 2 oz).   Weight management plan: Discussed healthy diet and exercise guidelines      Follow Up: see above. Additionally patient was instructed to contact clinic for worsening symptoms, non-improvement in time frame discussed, and for questions regarding treatment plan.   For virtual visits, the patient was advised to be seen for in person evaluation if symptoms or condition are worsening or non-improvement as expected.   Tricia Baxter PA-C    Srini Mario is a 64 year old, presenting for the following health issues:  Diabetes      6/12/2024     6:59 AM   Additional Questions   Roomed by VE         6/12/2024     6:59 AM   Patient Reported Additional Medications   Patient reports taking the following new medications OTC vitamin for eyes     Via the Health Maintenance questionnaire, the patient has reported the following services have been completed -Eye Exam: [facility] 2024-05-10, this information has been sent to the abstraction team.      History of Present Illness       Diabetes:   She presents for follow up of diabetes.  She is checking home blood glucose one time daily.   She checks blood glucose before meals.  Blood glucose is never over 200 and never under 70.  When her blood glucose is low, the patient is asymptomatic for confusion, blurred vision, lethargy and reports not feeling dizzy, shaky, or weak.   She has no concerns regarding her diabetes at this time.  She is having numbness in feet.  The patient has had a diabetic eye exam in the last 12 months. Eye exam performed on 5/10/2024. Location of last eye exam [facility].        She eats 2-3 servings of fruits and vegetables daily.She consumes 1 sweetened beverage(s) daily.She exercises with enough effort to increase her heart rate 9 or less minutes per day.  She exercises with enough effort to increase her heart rate 3 or less days per week.   She is taking medications " "regularly.     Diabetes  Medications: metformin  Last a1c: 6.6% from Nov 2023  Blood sugars: checks once daily running      Hypoglycemia: no   Exercise:  push mows the lawn once a week- 2 hours.   Weight:  stable   ACEi/ARB: lisinopril  Statin: zocor  Aspirin: yes  Eye exam: eye exam - 05/10/2024- [facility] -  No Diabetic retinopathy was detected. Has report for us today.   Foot concerns/exam: +numbness or tingling tops of both feet, not on bottoms of feet. Bothers when trying to sleep. Started in last year she thinks. Not on vegetarian/vegan diet.      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      Tobacco use- smokes 1-2 cig on way to work - mostly if there is traffic- 1-2x per week. Does not want help to quit. Just have to decide to do it. Ready to be done, I try. Did not smoke at all when went on vacation with her sisters because they don't smoke.     Hyperlipidemia Follow-Up    Are you regularly taking any medication or supplement to lower your cholesterol?   Yes- Simvastatin  Are you having muscle aches or other side effects that you think could be caused by your cholesterol lowering medication?  No    Hypertension Follow-up    Do you check your blood pressure regularly outside of the clinic? No   Are you following a low salt diet? Yes  Are your blood pressures ever more than 140 on the top number (systolic) OR more   than 90 on the bottom number (diastolic), for example 140/90? No        Review of Systems  No vaginal bleeding. No GYN concerns. Last pelvic Nov 2023.   Constitutional, HEENT, cardiovascular, pulmonary, gi and gu systems are negative, except as otherwise noted.        Objective    /76 (BP Location: Left arm, Patient Position: Chair, Cuff Size: Adult Regular)   Pulse 60   Temp 97.5  F (36.4  C) (Temporal)   Resp 18   Ht 1.594 m (5' 2.75\")   Wt 80.8 kg (178 lb 2 oz)   LMP  (LMP Unknown)   SpO2 99%   Breastfeeding No   BMI 31.81 " kg/m    Body mass index is 31.81 kg/m .  Physical Exam   GENERAL: alert and no distress  EYES: Eyes grossly normal to inspection, PERRL and conjunctivae and sclerae normal  HENT: ear canals and TM's normal, nose and mouth without ulcers or lesions  NECK: no adenopathy, no asymmetry, masses, or scars  RESP: lungs clear to auscultation - no rales, rhonchi or wheezes  CV: regular rate and rhythm, normal S1 S2, no S3 or S4, no murmur, click or rub, no peripheral edema  ABDOMEN: soft, nontender, no hepatosplenomegaly, no masses and bowel sounds normal  MS: no gross musculoskeletal defects noted, no edema  NEURO: Normal strength and tone, mentation intact and speech normal  BACK: no CVA tenderness, no paralumbar tenderness  Diabetic foot exam: normal DP and PT pulses, no trophic changes or ulcerative lesions, normal sensory exam, and normal monofilament exam    Results for orders placed or performed in visit on 06/12/24   HEMOGLOBIN A1C     Status: Abnormal   Result Value Ref Range    Hemoglobin A1C 6.4 (H) 0.0 - 5.6 %   CBC with platelets and differential     Status: Abnormal   Result Value Ref Range    WBC Count 7.1 4.0 - 11.0 10e3/uL    RBC Count 3.73 (L) 3.80 - 5.20 10e6/uL    Hemoglobin 11.7 11.7 - 15.7 g/dL    Hematocrit 34.8 (L) 35.0 - 47.0 %    MCV 93 78 - 100 fL    MCH 31.4 26.5 - 33.0 pg    MCHC 33.6 31.5 - 36.5 g/dL    RDW 12.9 10.0 - 15.0 %    Platelet Count 191 150 - 450 10e3/uL    % Neutrophils 63 %    % Lymphocytes 23 %    % Monocytes 8 %    % Eosinophils 7 %    % Basophils 0 %    % Immature Granulocytes 0 %    Absolute Neutrophils 4.5 1.6 - 8.3 10e3/uL    Absolute Lymphocytes 1.6 0.8 - 5.3 10e3/uL    Absolute Monocytes 0.5 0.0 - 1.3 10e3/uL    Absolute Eosinophils 0.5 0.0 - 0.7 10e3/uL    Absolute Basophils 0.0 0.0 - 0.2 10e3/uL    Absolute Immature Granulocytes 0.0 <=0.4 10e3/uL   CBC with Platelets & Differential     Status: Abnormal    Narrative    The following orders were created for panel order CBC  with Platelets & Differential.  Procedure                               Abnormality         Status                     ---------                               -----------         ------                     CBC with platelets and d...[[id number]]  Abnormal            Final result                 Please view results for these tests on the individual orders.           Signed Electronically by: Tricia Baxter PA-C

## 2024-06-12 NOTE — Clinical Note
Please abstract the following data from this visit with this patient into the appropriate field in Epic:  Tests that can be patient reported without a hard copy:  Eye exam with ophthalmology on this date: 05/10/24 Exam Location: [facility]. Hard copy to scanning  Other Tests found in the patient's chart through Chart Review/Care Everywhere:   Note to Abstraction: If this section is blank, no results were found via Chart Review/Care Everywhere.

## 2024-06-21 ENCOUNTER — LAB (OUTPATIENT)
Dept: LAB | Facility: CLINIC | Age: 64
End: 2024-06-21
Payer: COMMERCIAL

## 2024-06-21 DIAGNOSIS — E11.9 TYPE 2 DIABETES MELLITUS WITHOUT COMPLICATION, WITHOUT LONG-TERM CURRENT USE OF INSULIN (H): ICD-10-CM

## 2024-06-21 DIAGNOSIS — I10 BENIGN ESSENTIAL HYPERTENSION: ICD-10-CM

## 2024-06-21 LAB
ANION GAP SERPL CALCULATED.3IONS-SCNC: 13 MMOL/L (ref 7–15)
BUN SERPL-MCNC: 28.3 MG/DL (ref 8–23)
CALCIUM SERPL-MCNC: 10 MG/DL (ref 8.8–10.2)
CHLORIDE SERPL-SCNC: 104 MMOL/L (ref 98–107)
CREAT SERPL-MCNC: 1.09 MG/DL (ref 0.51–0.95)
DEPRECATED HCO3 PLAS-SCNC: 24 MMOL/L (ref 22–29)
EGFRCR SERPLBLD CKD-EPI 2021: 56 ML/MIN/1.73M2
GLUCOSE SERPL-MCNC: 149 MG/DL (ref 70–99)
POTASSIUM SERPL-SCNC: 4.5 MMOL/L (ref 3.4–5.3)
SODIUM SERPL-SCNC: 141 MMOL/L (ref 135–145)

## 2024-06-21 PROCEDURE — 80048 BASIC METABOLIC PNL TOTAL CA: CPT

## 2024-06-21 PROCEDURE — 36415 COLL VENOUS BLD VENIPUNCTURE: CPT

## 2024-06-24 PROBLEM — E11.9 TYPE 2 DIABETES MELLITUS WITHOUT COMPLICATION, WITHOUT LONG-TERM CURRENT USE OF INSULIN (H): Chronic | Status: ACTIVE | Noted: 2017-12-08

## 2024-06-24 PROBLEM — N18.30 CKD (CHRONIC KIDNEY DISEASE) STAGE 3, GFR 30-59 ML/MIN (H): Chronic | Status: ACTIVE | Noted: 2024-06-24

## 2024-06-24 PROBLEM — N18.30 CKD (CHRONIC KIDNEY DISEASE) STAGE 3, GFR 30-59 ML/MIN (H): Status: ACTIVE | Noted: 2024-06-24

## 2024-06-28 ENCOUNTER — MYC MEDICAL ADVICE (OUTPATIENT)
Dept: FAMILY MEDICINE | Facility: CLINIC | Age: 64
End: 2024-06-28
Payer: COMMERCIAL

## 2024-06-28 DIAGNOSIS — R20.2 NUMBNESS AND TINGLING OF BOTH FEET: ICD-10-CM

## 2024-06-28 DIAGNOSIS — R20.0 NUMBNESS AND TINGLING OF BOTH FEET: ICD-10-CM

## 2024-06-28 RX ORDER — GABAPENTIN 100 MG/1
100 CAPSULE ORAL 3 TIMES DAILY
Qty: 270 CAPSULE | Refills: 1 | Status: SHIPPED | OUTPATIENT
Start: 2024-06-28

## 2024-07-23 ENCOUNTER — TELEPHONE (OUTPATIENT)
Dept: GASTROENTEROLOGY | Facility: CLINIC | Age: 64
End: 2024-07-23
Payer: COMMERCIAL

## 2024-07-23 ENCOUNTER — MYC MEDICAL ADVICE (OUTPATIENT)
Dept: FAMILY MEDICINE | Facility: CLINIC | Age: 64
End: 2024-07-23
Payer: COMMERCIAL

## 2024-07-23 DIAGNOSIS — Z12.11 SPECIAL SCREENING FOR MALIGNANT NEOPLASMS, COLON: Primary | ICD-10-CM

## 2024-07-23 NOTE — TELEPHONE ENCOUNTER
Patient Quality Outreach    Patient is due for the following:   Colon Cancer Screening  Breast Cancer Screening - Mammogram      Topic Date Due    COVID-19 Vaccine (5 - 2023-24 season) 09/01/2023       Next Steps:   No follow up needed at this time.    Type of outreach:    Sent Take5 message.  If not read in 1 week send letter    Questions for provider review:    None           Jossy Ellison, [facility]  Chart routed to Care Team.

## 2024-07-23 NOTE — TELEPHONE ENCOUNTER
"Endoscopy Scheduling Screen    Have you had a positive Covid test in the last 14 days?  No    What is your communication preference for Instructions and/or Bowel Prep?   MyChart    What insurance is in the chart?  Other:  [employer] MEDICARE    Ordering/Referring Provider: JENI CHARLTON    (If ordering provider performs procedure, schedule with ordering provider unless otherwise instructed. )    BMI: Estimated body mass index is 31.81 kg/m  as calculated from the following:    Height as of 6/12/24: 1.594 m (5' 2.75\").    Weight as of 6/12/24: 80.8 kg (178 lb 2 oz).     Sedation Ordered  moderate sedation.   If patient BMI > 50 do not schedule in [facility].    If patient BMI > 45 do not schedule at [facility].    Are you taking methadone or Suboxone?  No    Have you had difficulties, pain, or discomfort during past endoscopy procedures?  No    Are you taking any prescription medications for pain 3 or more times per week?   YES, RN review needed to determine if MAC is required.  (RN Review required.)     Do you have a history of malignant hyperthermia?  No    (Females) Are you currently pregnant?   No     Have you been diagnosed or told you have pulmonary hypertension?   No    Do you have an LVAD?  No    Have you been told you have moderate to severe sleep apnea?  No    Have you been told you have COPD, asthma, or any other lung disease?  No    Do you have any heart conditions?  No     Have you ever had or are you waiting for an organ transplant?  No. Continue scheduling, no site restrictions.    Have you had a stroke or transient ischemic attack (TIA aka \"mini stroke\" in the last 6 months?   No    Have you been diagnosed with or been told you have cirrhosis of the liver?   No    Are you currently on dialysis?   No    Do you need assistance transferring?   No    BMI: Estimated body mass index is 31.81 kg/m  as calculated from the following:    Height as of 6/12/24: 1.594 m (5' 2.75\").    Weight as of 6/12/24: 80.8 kg (178 lb 2 " oz).     Is patients BMI > 40 and scheduling location [facility]?  No    Do you take an injectable medication for weight loss or diabetes (excluding insulin)?  No    Do you take the medication Naltrexone?  No    Do you take blood thinners?  No       Prep   Are you currently on dialysis or do you have chronic kidney disease?  No    Do you have a diagnosis of diabetes?  Yes (Golytely Prep)    Do you have a diagnosis of cystic fibrosis (CF)?  No    On a regular basis do you go 3 -5 days between bowel movements?  No    BMI > 40?  No    Preferred Pharmacy:    [facility] [id number] [facility] [address] [facility]  [id number] [facility] [address]  Phone: [phone] Fax: [phone]      Final Scheduling Details     Procedure scheduled  Colonoscopy    Surgeon:  Skylar     Date of procedure:  9/16/24     Pre-OP / PAC:   No - Not required for this site.    Location  [facility] - Patient preference.    Sedation   MAC/Deep Sedation  Per location      Patient Reminders:   You will receive a call from a Nurse to review instructions and health history.  This assessment must be completed prior to your procedure.  Failure to complete the Nurse assessment may result in the procedure being cancelled.      On the day of your procedure, please designate an adult(s) who can drive you home stay with you for the next 24 hours. The medicines used in the exam will make you sleepy. You will not be able to drive.      You cannot take public transportation, ride share services, or non-medical taxi service without a responsible caregiver.  Medical transport services are allowed with the requirement that a responsible caregiver will receive you at your destination.  We require that drivers and caregivers are confirmed prior to your procedure.

## 2024-08-21 ENCOUNTER — ANCILLARY PROCEDURE (OUTPATIENT)
Dept: MAMMOGRAPHY | Facility: OTHER | Age: 64
End: 2024-08-21
Attending: PHYSICIAN ASSISTANT
Payer: COMMERCIAL

## 2024-08-21 DIAGNOSIS — Z12.31 ENCOUNTER FOR SCREENING MAMMOGRAM FOR MALIGNANT NEOPLASM OF BREAST: ICD-10-CM

## 2024-08-21 PROCEDURE — 77067 SCR MAMMO BI INCL CAD: CPT | Mod: TC | Performed by: RADIOLOGY

## 2024-08-21 PROCEDURE — 77063 BREAST TOMOSYNTHESIS BI: CPT | Mod: TC | Performed by: RADIOLOGY

## 2024-08-26 RX ORDER — BISACODYL 5 MG/1
TABLET, DELAYED RELEASE ORAL
Qty: 4 TABLET | Refills: 0 | Status: SHIPPED | OUTPATIENT
Start: 2024-08-26

## 2024-08-26 NOTE — TELEPHONE ENCOUNTER
Standard Golytely Bowel Prep recommended due to diabetes.  Instructions were sent via [employer]. Bowel prep was sent 8/26/2024 to [facility] [address] [facility].

## 2024-09-13 RX ORDER — FLUMAZENIL 0.1 MG/ML
0.2 INJECTION, SOLUTION INTRAVENOUS
Status: CANCELLED | OUTPATIENT
Start: 2024-09-13 | End: 2024-09-14

## 2024-09-13 RX ORDER — NALOXONE HYDROCHLORIDE 0.4 MG/ML
0.4 INJECTION, SOLUTION INTRAMUSCULAR; INTRAVENOUS; SUBCUTANEOUS
Status: CANCELLED | OUTPATIENT
Start: 2024-09-13

## 2024-09-13 RX ORDER — PROCHLORPERAZINE MALEATE 5 MG
5 TABLET ORAL EVERY 6 HOURS PRN
Status: CANCELLED | OUTPATIENT
Start: 2024-09-13

## 2024-09-13 RX ORDER — ONDANSETRON 4 MG/1
4 TABLET, ORALLY DISINTEGRATING ORAL EVERY 6 HOURS PRN
Status: CANCELLED | OUTPATIENT
Start: 2024-09-13

## 2024-09-13 RX ORDER — ONDANSETRON 2 MG/ML
4 INJECTION INTRAMUSCULAR; INTRAVENOUS EVERY 6 HOURS PRN
Status: CANCELLED | OUTPATIENT
Start: 2024-09-13

## 2024-09-13 RX ORDER — NALOXONE HYDROCHLORIDE 0.4 MG/ML
0.2 INJECTION, SOLUTION INTRAMUSCULAR; INTRAVENOUS; SUBCUTANEOUS
Status: CANCELLED | OUTPATIENT
Start: 2024-09-13

## 2024-09-15 ENCOUNTER — ANESTHESIA EVENT (OUTPATIENT)
Dept: GASTROENTEROLOGY | Facility: CLINIC | Age: 64
End: 2024-09-15
Payer: COMMERCIAL

## 2024-09-15 ASSESSMENT — LIFESTYLE VARIABLES: TOBACCO_USE: 1

## 2024-09-16 ENCOUNTER — ANESTHESIA (OUTPATIENT)
Dept: GASTROENTEROLOGY | Facility: CLINIC | Age: 64
End: 2024-09-16
Payer: COMMERCIAL

## 2024-09-16 ENCOUNTER — HOSPITAL ENCOUNTER (OUTPATIENT)
Facility: CLINIC | Age: 64
Discharge: HOME | End: 2024-09-16
Attending: SURGERY | Admitting: SURGERY
Payer: COMMERCIAL

## 2024-09-16 VITALS
RESPIRATION RATE: 16 BRPM | DIASTOLIC BLOOD PRESSURE: 76 MMHG | OXYGEN SATURATION: 96 % | SYSTOLIC BLOOD PRESSURE: 166 MMHG | TEMPERATURE: 97.7 F | HEART RATE: 60 BPM

## 2024-09-16 LAB
COLONOSCOPY: NORMAL
GLUCOSE BLDC GLUCOMTR-MCNC: 110 MG/DL (ref 70–99)

## 2024-09-16 PROCEDURE — 370N000017 HC ANESTHESIA TECHNICAL FEE, PER MIN: Performed by: SURGERY

## 2024-09-16 PROCEDURE — 82962 GLUCOSE BLOOD TEST: CPT

## 2024-09-16 PROCEDURE — 45385 COLONOSCOPY W/LESION REMOVAL: CPT | Mod: PT | Performed by: SURGERY

## 2024-09-16 PROCEDURE — 250N000011 HC RX IP 250 OP 636: Performed by: NURSE ANESTHETIST, CERTIFIED REGISTERED

## 2024-09-16 PROCEDURE — 88305 TISSUE EXAM BY PATHOLOGIST: CPT | Mod: 26 | Performed by: PATHOLOGY

## 2024-09-16 PROCEDURE — 250N000009 HC RX 250: Performed by: NURSE ANESTHETIST, CERTIFIED REGISTERED

## 2024-09-16 PROCEDURE — 258N000003 HC RX IP 258 OP 636: Performed by: NURSE ANESTHETIST, CERTIFIED REGISTERED

## 2024-09-16 PROCEDURE — 88305 TISSUE EXAM BY PATHOLOGIST: CPT | Mod: TC | Performed by: SURGERY

## 2024-09-16 PROCEDURE — 45380 COLONOSCOPY AND BIOPSY: CPT | Performed by: SURGERY

## 2024-09-16 RX ORDER — ONDANSETRON 2 MG/ML
4 INJECTION INTRAMUSCULAR; INTRAVENOUS
Status: DISCONTINUED | OUTPATIENT
Start: 2024-09-16 | End: 2024-09-16 | Stop reason: HOSPADM

## 2024-09-16 RX ORDER — LIDOCAINE 40 MG/G
CREAM TOPICAL
Status: DISCONTINUED | OUTPATIENT
Start: 2024-09-16 | End: 2024-09-16 | Stop reason: HOSPADM

## 2024-09-16 RX ORDER — PROPOFOL 10 MG/ML
INJECTION, EMULSION INTRAVENOUS PRN
Status: DISCONTINUED | OUTPATIENT
Start: 2024-09-16 | End: 2024-09-16

## 2024-09-16 RX ORDER — PROPOFOL 10 MG/ML
INJECTION, EMULSION INTRAVENOUS CONTINUOUS PRN
Status: DISCONTINUED | OUTPATIENT
Start: 2024-09-16 | End: 2024-09-16

## 2024-09-16 RX ORDER — LIDOCAINE HYDROCHLORIDE 20 MG/ML
INJECTION, SOLUTION INFILTRATION; PERINEURAL PRN
Status: DISCONTINUED | OUTPATIENT
Start: 2024-09-16 | End: 2024-09-16

## 2024-09-16 RX ORDER — SODIUM CHLORIDE, SODIUM LACTATE, POTASSIUM CHLORIDE, CALCIUM CHLORIDE 600; 310; 30; 20 MG/100ML; MG/100ML; MG/100ML; MG/100ML
INJECTION, SOLUTION INTRAVENOUS CONTINUOUS PRN
Status: DISCONTINUED | OUTPATIENT
Start: 2024-09-16 | End: 2024-09-16

## 2024-09-16 RX ADMIN — SODIUM CHLORIDE, POTASSIUM CHLORIDE, SODIUM LACTATE AND CALCIUM CHLORIDE: 600; 310; 30; 20 INJECTION, SOLUTION INTRAVENOUS at 15:42

## 2024-09-16 RX ADMIN — PROPOFOL 50 MG: 10 INJECTION, EMULSION INTRAVENOUS at 15:56

## 2024-09-16 RX ADMIN — LIDOCAINE HYDROCHLORIDE 50 MG: 20 INJECTION, SOLUTION INFILTRATION; PERINEURAL at 15:55

## 2024-09-16 RX ADMIN — PROPOFOL 175 MCG/KG/MIN: 10 INJECTION, EMULSION INTRAVENOUS at 15:57

## 2024-09-16 ASSESSMENT — ACTIVITIES OF DAILY LIVING (ADL)
ADLS_ACUITY_SCORE: 35
ADLS_ACUITY_SCORE: 35

## 2024-09-16 NOTE — H&P
Patient seen for Endoscopy    HPI:  Patient is a 64 year old female here for endoscopy. Not taking blood thinning medications. No MI or CVA history. No issues with previous sedation. No recent acute illness.    Review Of Systems    Skin: negative  Ears/Nose/Throat: negative  Respiratory: No shortness of breath, dyspnea on exertion, cough, or hemoptysis  Cardiovascular: negative  Gastrointestinal: negative  Genitourinary: negative  Musculoskeletal: negative  Neurologic: negative  Hematologic/Lymphatic/Immunologic: negative  Endocrine: negative      Past Medical History:   Diagnosis Date    ASCUS of cervix with negative high risk HPV 10/17/2017    Diabetes (H)     oral meds    Endometrial cancer (H)     HTN (hypertension)     Migraine     Obesity     Thyroid disease 7/2005    Graves       Past Surgical History:   Procedure Laterality Date    ABDOMEN SURGERY      CYSTOSCOPY N/A 12/05/2017    Procedure: CYSTOSCOPY;;  Surgeon: Facundo Parekh MD;  Location: [facility] OR    DAVINCI HYSTERECTOMY TOTAL, BILATERAL SALPINGO-OOPHORECTOMY, NODE DISSECTION, COMBINED N/A 12/05/2017    Procedure: COMBINED DAVINCI HYSTERECTOMY TOTAL, SALPINGO-OOPHORECTOMY, NODE DISSECTION;  DaVinci Assisted Total Laparoscopic Hysterectomy, Right Salpingo-Oophorectomy, Lysis of Adhesions, Cystoscopy;  Surgeon: Facundo Parekh MD;  Location: [facility] OR    HERNIORRHAPHY, INCISIONAL, ROBOT-ASSISTED, LAPAROSCOPIC, USING DA JUVENTINO XI N/A 5/24/2023    Procedure: HERNIORRHAPHY, INCISIONAL, ROBOT-ASSISTED, LAPAROSCOPIC, USING DA JUVENTINO XI;  Surgeon: Anival Williamson MD;  Location: [facility]    HYSTERECTOMY, PAP NO LONGER INDICATED      LAPAROSCOPIC HYSTERECTOMY TOTAL  12/2017    oopherectomy Left     13 lb benign mass removal    ORTHOPEDIC SURGERY         Family History   Problem Relation Age of Onset    Hypertension Mother     Hyperlipidemia Mother     Cerebrovascular Disease Mother     Macular Degeneration Mother     Influenza/Pneumonia Father 62    Diabetes  Father     Kidney Disease Sister     Diabetes Sister     Depression Sister     Diabetes Brother     Diabetes Brother     Colon Cancer Brother 64       Social History     Socioeconomic History    Marital status: Single     Spouse name: Not on file    Number of children: Not on file    Years of education: Not on file    Highest education level: Not on file   Occupational History    Not on file   Tobacco Use    Smoking status: Some Days     Current packs/day: 0.00     Average packs/day: 0.5 packs/day for 30.0 years (15.0 ttl pk-yrs)     Types: Cigarettes     Start date: 1987     Last attempt to quit: 2017     Years since quittin.5     Passive exposure: Never    Smokeless tobacco: Never    Tobacco comments:     smoked 30 years- half pack a day. States that she does not smoke very often anymore (22)- 2 cig per week    Vaping Use    Vaping status: Never Used   Substance and Sexual Activity    Alcohol use: Yes     Comment: social    Drug use: No    Sexual activity: Not Currently     Partners: Male     Birth control/protection: Pull-out method, I.U.D., Natural Family Planning   Other Topics Concern    Parent/sibling w/ CABG, MI or angioplasty before 65F 55M? No   Social History Narrative    Not on file     Social Determinants of Health     Financial Resource Strain: Low Risk  (10/25/2023)    Financial Resource Strain     Within the past 12 months, have you or your family members you live with been unable to get utilities (heat, electricity) when it was really needed?: No   Food Insecurity: Low Risk  (10/25/2023)    Food Insecurity     Within the past 12 months, did you worry that your food would run out before you got money to buy more?: No     Within the past 12 months, did the food you bought just not last and you didn t have money to get more?: No   Transportation Needs: Low Risk  (10/25/2023)    Transportation Needs     Within the past 12 months, has lack of transportation kept you from medical  appointments, getting your medicines, non-medical meetings or appointments, work, or from getting things that you need?: No   Physical Activity: Not on file   Stress: Not on file   Social Connections: Not on file   Interpersonal Safety: Low Risk  (6/12/2024)    Interpersonal Safety     Do you feel physically and emotionally safe where you currently live?: Yes     Within the past 12 months, have you been hit, slapped, kicked or otherwise physically hurt by someone?: No     Within the past 12 months, have you been humiliated or emotionally abused in other ways by your partner or ex-partner?: No   Housing Stability: Low Risk  (10/25/2023)    Housing Stability     Do you have housing? : Yes     Are you worried about losing your housing?: No       No current outpatient medications on file.       Medications and history reviewed    Physical exam:  Vitals: LMP  (LMP Unknown)   BMI= There is no height or weight on file to calculate BMI.    Constitutional: Healthy, alert, non-distressed   Head: Normo-cephalic, atraumatic, no lesions, masses or tenderness   Cardiovascular: RRR, no new murmurs, +S1, +S2 heart sounds, no clicks, rubs or gallops   Respiratory: CTAB, no rales, rhonchi or wheezing, equal chest rise, good respiratory effort   Gastrointestinal: Soft, non-tender, non distended, no rebound rigidity or guarding, no masses or hernias palpated   : Deferred  Musculoskeletal: Moves all extremities, normal  strength, no deformities noted   Skin: No suspicious lesions or rashes   Psychiatric: Mentation appears normal, affect appropriate   Hematologic/Lymphatic/Immunologic: Normal cervical and supraclavicular lymph nodes   Patient able to get up on table without difficulty.    Labs show:  No results found for this or any previous visit (from the past 24 hour(s)).    Assessment: Endoscopy  Plan: Pt cleared for anesthesia for proposed procedure.    Roberto Cheng,

## 2024-09-16 NOTE — LETTER
Shelbi Howard  [id number] [facility] [address]  September 18, 2024    Dear Shelbi,  This letter is to inform you of the results of your pathology report from your colonoscopy.  Your pathology report was:  Final Diagnosis   A. Colon, Transverse, polyps x2, polypectomy:  -Fragments of tubular adenomas  -Negative for high-grade dysplasia and malignancy.   These are benign polyps. These types of polyps do carry a small risk of developing into a cancer over time if not removed. Yours were completely removed at the time of your colonoscopy. You should have another surveillance colonoscopy in 5 years.  If you have further questions please don t hesitate to call our clinic at [phone].   Sincerely,     Roberto Cheng, DO

## 2024-09-16 NOTE — ANESTHESIA POSTPROCEDURE EVALUATION
Patient: Shelbi Howard    Procedure: Procedure(s):  COLONOSCOPY, WITH POLYPECTOMY       Anesthesia Type:  MAC    Note:  Disposition: Outpatient   Postop Pain Control: Uneventful            Sign Out: Well controlled pain   PONV: No   Neuro/Psych: Uneventful            Sign Out: Acceptable/Baseline neuro status   Airway/Respiratory: Uneventful            Sign Out: Acceptable/Baseline resp. status   CV/Hemodynamics: Uneventful            Sign Out: Acceptable CV status   Other NRE: NONE   DID A NON-ROUTINE EVENT OCCUR? No    Event details/Postop Comments:  Pt was happy with anesthesia care.  No complications.  I will follow up with the pt if needed.           Last vitals:  Vitals Value Taken Time   /76 09/16/24 1630   Temp     Pulse 60 09/16/24 1630   Resp 16 09/16/24 1630   SpO2 96 % 09/16/24 1630       Electronically Signed By: BERLIN Henao CRNA  September 16, 2024  5:43 PM

## 2024-09-16 NOTE — ANESTHESIA PREPROCEDURE EVALUATION
Anesthesia Pre-Procedure Evaluation    Patient: Shelbi Howard   MRN: [id number] : 1960        Procedure : Procedure(s):  Colonoscopy          Past Medical History:   Diagnosis Date     ASCUS of cervix with negative high risk HPV 10/17/2017     Diabetes (H)     oral meds     Endometrial cancer (H)      HTN (hypertension)      Migraine      Obesity      Thyroid disease 2005    Graves      Past Surgical History:   Procedure Laterality Date     ABDOMEN SURGERY       CYSTOSCOPY N/A 2017    Procedure: CYSTOSCOPY;;  Surgeon: Facundo Parekh MD;  Location: [facility]     DAVINCI HYSTERECTOMY TOTAL, BILATERAL SALPINGO-OOPHORECTOMY, NODE DISSECTION, COMBINED N/A 2017    Procedure: COMBINED DAVINCI HYSTERECTOMY TOTAL, SALPINGO-OOPHORECTOMY, NODE DISSECTION;  DaVinci Assisted Total Laparoscopic Hysterectomy, Right Salpingo-Oophorectomy, Lysis of Adhesions, Cystoscopy;  Surgeon: Facundo Parekh MD;  Location: [facility] OR     HERNIORRHAPHY, INCISIONAL, ROBOT-ASSISTED, LAPAROSCOPIC, USING DA JUVENTINO XI N/A 2023    Procedure: HERNIORRHAPHY, INCISIONAL, ROBOT-ASSISTED, LAPAROSCOPIC, USING DA JUVENTINO XI;  Surgeon: Anival Williamson MD;  Location[facility]     HYSTERECTOMY, PAP NO LONGER INDICATED       LAPAROSCOPIC HYSTERECTOMY TOTAL  2017     oopherectomy Left     13 lb benign mass removal     ORTHOPEDIC SURGERY        Allergies   Allergen Reactions     Latex Itching      Social History     Tobacco Use     Smoking status: Some Days     Current packs/day: 0.00     Average packs/day: 0.5 packs/day for 30.0 years (15.0 ttl pk-yrs)     Types: Cigarettes     Start date: 1987     Last attempt to quit: 2017     Years since quittin.5     Passive exposure: Never     Smokeless tobacco: Never     Tobacco comments:     smoked 30 years- half pack a day. States that she does not smoke very often anymore (22)- 2 cig per week    Substance Use Topics     Alcohol use: Yes     Comment: social      Wt Readings  from Last 1 Encounters:   06/12/24 80.8 kg (178 lb 2 oz)        Anesthesia Evaluation   Pt has had prior anesthetic. Type: MAC and General.    No history of anesthetic complications       ROS/MED HX  ENT/Pulmonary:     (+)                tobacco use, Current use,                       Neurologic:  - neg neurologic ROS     Cardiovascular:     (+) Dyslipidemia hypertension- -   -  - -                                 Previous cardiac testing   Echo: Date: Results:    Stress Test:  Date: 5/19/23 Results:  Interpretation Summary  Normal, low-risk dobutamine stress echocardiogram.  The target heart rate was achieved. Normal heart rate and BP response to  dobutamine.  Normal biventricular size, thickness, and global systolic function at  baseline, LVEF=55-60%.  With dobutamine LVEF increased to >70% and LV cavity size decreased  appropriately.  No regional wall motion abnormalities are present at rest or with dobutamine.  No angina was elicited.  No ECG evidence of ischemia.  PVC s noted during stress test. PVC Triplet during recovery likely dobutamine  effect.  No significant valvular abnormalities are noted on screening Doppler exam.  The aortic root and visualized ascending aorta are normal.  ______________________________________________________________________________  Stress  The drug infusion was stopped due to target heart rate achieved.  The patient did not exhibit any symptoms during drug infusion.  The maximum dose of dobutamine was 40mcg/kg/min.  The maximum dose of atropine was 0.2mg.  The maximum dose of metoprolol was 2mg.  Definity (NDC #05071-329-73) given intravenously.  Patient was given 5.ml mixture of 1.5ml Definity and 8.5ml saline.  5 ml wasted.     Stress Results                                       Maximum Predicted HR:   155 bpm             Target HR: 132 bpm        % Maximum Predicted HR: 85 %                             Stage  DurationHeart Rate  BP                                 (mm:ss)    (bpm)                         Baseline            64    139/64                           Peak   11:27     131    136/43                        Stress Duration:   11:27 mm:ss *                     Maximum Stress HR: 131 bpm *     Procedure  Dobutamine stress echo with two dimensional color and spectral Doppler  performed. Contrast Definity.  ECG Reviewed:  Date: 5/17/23 Results:  Sinus  Bradycardia  -First degree A-V block   Yola = 240  -Right sided conduction defect and right axis -possible right ventricular hypertrophy or posterior fascicular block.    -Old anteroseptal infarct.    -  Negative T-waves  - Lateral ischemia.  Cath:  Date: Results:      METS/Exercise Tolerance: >4 METS    Hematologic:  - neg hematologic  ROS     Musculoskeletal:  - neg musculoskeletal ROS     GI/Hepatic:  - neg GI/hepatic ROS     Renal/Genitourinary:     (+) renal disease, type: CRI, Pt does not require dialysis,           Endo:     (+)  type II DM, Last HgA1c: 6.4, date: 6/12/24, Not using insulin, - not using insulin pump.  not previously admitted for DM/DKA.  thyroid problem, Thyroid disease - Other graves disease,    Obesity,       Psychiatric/Substance Use:  - neg psychiatric ROS     Infectious Disease:  - neg infectious disease ROS     Malignancy:   (+) Malignancy, History of Other.Other CA endometrial carcinoma s/p hyst 12/2017 status post Surgery.    Other:  - neg other ROS          Physical Exam    Airway  airway exam normal      Mallampati: II   TM distance: > 3 FB   Neck ROM: full   Mouth opening: > 3 cm    Respiratory Devices and Support         Dental       (+) Removable bridges or other hardware      Cardiovascular   cardiovascular exam normal       Rhythm and rate: regular and normal     Pulmonary   pulmonary exam normal        breath sounds clear to auscultation       OUTSIDE LABS:  CBC:   Lab Results   Component Value Date    WBC 7.1 06/12/2024    WBC 8.4 11/01/2023    HGB 11.7 06/12/2024    HGB 11.7 11/01/2023    HCT  "34.8 (L) 06/12/2024    HCT 35.0 11/01/2023     06/12/2024     11/01/2023     BMP:   Lab Results   Component Value Date     06/21/2024     06/12/2024    POTASSIUM 4.5 06/21/2024    POTASSIUM 4.6 06/12/2024    CHLORIDE 104 06/21/2024    CHLORIDE 108 (H) 06/12/2024    CO2 24 06/21/2024    CO2 24 06/12/2024    BUN 28.3 (H) 06/21/2024    BUN 37.2 (H) 06/12/2024    CR 1.09 (H) 06/21/2024    CR 1.19 (H) 06/12/2024     (H) 06/21/2024     (H) 06/12/2024     COAGS: No results found for: \"PTT\", \"INR\", \"FIBR\"  POC:   Lab Results   Component Value Date     (H) 04/06/2018     HEPATIC:   Lab Results   Component Value Date    ALBUMIN 4.4 11/01/2023    PROTTOTAL 7.6 11/01/2023    ALT 21 11/01/2023    AST 19 11/01/2023    ALKPHOS 58 11/01/2023    BILITOTAL 0.2 11/01/2023     OTHER:   Lab Results   Component Value Date    A1C 6.4 (H) 06/12/2024    COLTEN 10.0 06/21/2024    TSH 1.13 06/12/2024    T4 1.33 12/08/2017       Anesthesia Plan    ASA Status:  2    NPO Status:  NPO Appropriate    Anesthesia Type: MAC.     - Reason for MAC: straight local not clinically adequate      Maintenance: TIVA.        Consents    Anesthesia Plan(s) and associated risks, benefits, and realistic alternatives discussed. Questions answered and patient/representative(s) expressed understanding.     - Discussed:     - Discussed with:  Patient       Use of blood products discussed: No .     Postoperative Care            Comments:    Other Comments: The risks and benefits of anesthesia, and the alternatives where applicable, have been discussed with the patient, and they wish to proceed.           Pasha Barrett, APRN CRNA    I have reviewed the pertinent notes and labs in the chart from the past 30 days and (re)examined the patient.  Any updates or changes from those notes are reflected in this note.                  "

## 2024-09-16 NOTE — DISCHARGE INSTRUCTIONS
[facility]    Home Care Following Endoscopy          Activity:  You have just undergone an endoscopic procedure under sedation.  Do not work or operate machinery (including a car) for at least 12 hours.      Diet:  Return to the diet you were on before your procedure but eat lightly for the first 12-24 hours.  Drink plenty of water.  Resume any regular medications unless otherwise advised by your physician.     If you had any biopsy or polyp removed please refrain from aspirin or aspirin products for 2 days.    Pain:  You may take Tylenol as needed for pain.  Expected Recovery:  You can expect some mild abdominal fullness and/or discomfort due to the air used to inflate your intestinal tract. I encourage you to walk and attempt to pass air as soon as possible.      Call Your Physician if You Have:  After Colonoscopy:  Worsening persisting abdominal pain which is worse with activity.  Fevers (>101 degrees F), chills or shakes.  Passage of continued blood with bowel movements.     Any questions or concerns about your recovery, please call [phone] or after hours [phone] Nurse Advice Line.    Follow-up Care:  If you had polyps/biopsy tissue sample(s) removed, the polyps/biopsy tissue sample(s) will be sent to pathology.  You should receive a letter in your My Chart with your results, within 1-2 weeks.   Please call if you have not received a notification of your results.

## 2024-09-16 NOTE — ANESTHESIA CARE TRANSFER NOTE
Patient: Shelbi Howard    Procedure: Procedure(s):  COLONOSCOPY, WITH POLYPECTOMY       Diagnosis: Screen for colon cancer [Z12.11]  Diagnosis Additional Information: No value filed.    Anesthesia Type:   MAC     Note:    Oropharynx: oropharynx clear of all foreign objects and spontaneously breathing  Level of Consciousness: drowsy  Oxygen Supplementation: room air    Independent Airway: airway patency satisfactory and stable  Dentition: dentition unchanged  Vital Signs Stable: post-procedure vital signs reviewed and stable  Report to RN Given: handoff report given  Patient transferred to: PACU    Handoff Report: Identifed the Patient, Identified the Reponsible Provider, Reviewed the pertinent medical history, Discussed the surgical course, Reviewed Intra-OP anesthesia mangement and issues during anesthesia, Set expectations for post-procedure period and Allowed opportunity for questions and acknowledgement of understanding      Vitals:  Vitals Value Taken Time   /68 09/16/24 1615   Temp     Pulse 64 09/16/24 1615   Resp     SpO2 94 % 09/16/24 1617   Vitals shown include unfiled device data.    Electronically Signed By: BERLIN Henao CRNA  September 16, 2024  4:18 PM

## 2024-09-18 LAB
PATH REPORT.COMMENTS IMP SPEC: NORMAL
PATH REPORT.COMMENTS IMP SPEC: NORMAL
PATH REPORT.FINAL DX SPEC: NORMAL
PATH REPORT.GROSS SPEC: NORMAL
PATH REPORT.MICROSCOPIC SPEC OTHER STN: NORMAL
PATH REPORT.RELEVANT HX SPEC: NORMAL
PHOTO IMAGE: NORMAL

## 2024-11-19 SDOH — HEALTH STABILITY: PHYSICAL HEALTH: ON AVERAGE, HOW MANY DAYS PER WEEK DO YOU ENGAGE IN MODERATE TO STRENUOUS EXERCISE (LIKE A BRISK WALK)?: 5 DAYS

## 2024-11-19 SDOH — HEALTH STABILITY: PHYSICAL HEALTH: ON AVERAGE, HOW MANY MINUTES DO YOU ENGAGE IN EXERCISE AT THIS LEVEL?: 0 MIN

## 2024-11-19 ASSESSMENT — SOCIAL DETERMINANTS OF HEALTH (SDOH): HOW OFTEN DO YOU GET TOGETHER WITH FRIENDS OR RELATIVES?: ONCE A WEEK

## 2024-11-20 ENCOUNTER — OFFICE VISIT (OUTPATIENT)
Dept: FAMILY MEDICINE | Facility: CLINIC | Age: 64
End: 2024-11-20
Attending: PHYSICIAN ASSISTANT
Payer: COMMERCIAL

## 2024-11-20 ENCOUNTER — MYC MEDICAL ADVICE (OUTPATIENT)
Dept: FAMILY MEDICINE | Facility: CLINIC | Age: 64
End: 2024-11-20

## 2024-11-20 VITALS
WEIGHT: 178 LBS | HEART RATE: 70 BPM | DIASTOLIC BLOOD PRESSURE: 64 MMHG | OXYGEN SATURATION: 99 % | TEMPERATURE: 97.9 F | BODY MASS INDEX: 30.39 KG/M2 | HEIGHT: 64 IN | SYSTOLIC BLOOD PRESSURE: 152 MMHG | RESPIRATION RATE: 20 BRPM

## 2024-11-20 DIAGNOSIS — Z78.0 POSTMENOPAUSAL ESTROGEN DEFICIENCY: ICD-10-CM

## 2024-11-20 DIAGNOSIS — E11.9 TYPE 2 DIABETES MELLITUS WITHOUT COMPLICATION, WITHOUT LONG-TERM CURRENT USE OF INSULIN (H): ICD-10-CM

## 2024-11-20 DIAGNOSIS — I10 BENIGN ESSENTIAL HYPERTENSION: ICD-10-CM

## 2024-11-20 DIAGNOSIS — R20.2 NUMBNESS AND TINGLING OF BOTH FEET: ICD-10-CM

## 2024-11-20 DIAGNOSIS — G62.9 PERIPHERAL POLYNEUROPATHY: ICD-10-CM

## 2024-11-20 DIAGNOSIS — C54.1 ENDOMETRIAL CARCINOMA (H): ICD-10-CM

## 2024-11-20 DIAGNOSIS — Z00.00 ENCOUNTER FOR MEDICARE ANNUAL WELLNESS EXAM: Primary | ICD-10-CM

## 2024-11-20 DIAGNOSIS — R20.0 NUMBNESS AND TINGLING OF BOTH FEET: ICD-10-CM

## 2024-11-20 DIAGNOSIS — N18.30 STAGE 3 CHRONIC KIDNEY DISEASE, UNSPECIFIED WHETHER STAGE 3A OR 3B CKD (H): ICD-10-CM

## 2024-11-20 LAB
ALBUMIN SERPL BCG-MCNC: 4.4 G/DL (ref 3.5–5.2)
ALP SERPL-CCNC: 54 U/L (ref 40–150)
ALT SERPL W P-5'-P-CCNC: 19 U/L (ref 0–50)
ANION GAP SERPL CALCULATED.3IONS-SCNC: 14 MMOL/L (ref 7–15)
AST SERPL W P-5'-P-CCNC: 17 U/L (ref 0–45)
BILIRUB SERPL-MCNC: 0.3 MG/DL
BUN SERPL-MCNC: 27.5 MG/DL (ref 8–23)
CALCIUM SERPL-MCNC: 9.9 MG/DL (ref 8.8–10.4)
CHLORIDE SERPL-SCNC: 103 MMOL/L (ref 98–107)
CHOLEST SERPL-MCNC: 145 MG/DL
CREAT SERPL-MCNC: 1.05 MG/DL (ref 0.51–0.95)
CREAT UR-MCNC: 67.1 MG/DL
EGFRCR SERPLBLD CKD-EPI 2021: 58 ML/MIN/1.73M2
EST. AVERAGE GLUCOSE BLD GHB EST-MCNC: 146 MG/DL
FASTING STATUS PATIENT QL REPORTED: YES
FASTING STATUS PATIENT QL REPORTED: YES
GLUCOSE SERPL-MCNC: 146 MG/DL (ref 70–99)
HBA1C MFR BLD: 6.7 % (ref 0–5.6)
HCO3 SERPL-SCNC: 23 MMOL/L (ref 22–29)
HDLC SERPL-MCNC: 40 MG/DL
LDLC SERPL CALC-MCNC: 56 MG/DL
MICROALBUMIN UR-MCNC: 22.8 MG/L
MICROALBUMIN/CREAT UR: 33.98 MG/G CR (ref 0–25)
NONHDLC SERPL-MCNC: 105 MG/DL
POTASSIUM SERPL-SCNC: 4.5 MMOL/L (ref 3.4–5.3)
PROT SERPL-MCNC: 7.4 G/DL (ref 6.4–8.3)
SODIUM SERPL-SCNC: 140 MMOL/L (ref 135–145)
TOTAL PROTEIN SERUM FOR ELP: 7.1 G/DL (ref 6.4–8.3)
TRIGL SERPL-MCNC: 244 MG/DL

## 2024-11-20 RX ORDER — GABAPENTIN 100 MG/1
CAPSULE ORAL
Qty: 450 CAPSULE | Refills: 1 | Status: SHIPPED | OUTPATIENT
Start: 2024-11-20

## 2024-11-20 RX ORDER — LISINOPRIL AND HYDROCHLOROTHIAZIDE 12.5; 2 MG/1; MG/1
1 TABLET ORAL DAILY
Qty: 90 TABLET | Refills: 0 | Status: SHIPPED | OUTPATIENT
Start: 2024-11-20

## 2024-11-20 RX ORDER — SIMVASTATIN 10 MG
10 TABLET ORAL AT BEDTIME
Qty: 90 TABLET | Refills: 3 | Status: SHIPPED | OUTPATIENT
Start: 2024-11-20

## 2024-11-20 RX ORDER — LISINOPRIL AND HYDROCHLOROTHIAZIDE 10; 12.5 MG/1; MG/1
1 TABLET ORAL DAILY
Qty: 90 TABLET | Refills: 1 | Status: CANCELLED | OUTPATIENT
Start: 2024-11-20

## 2024-11-20 ASSESSMENT — PAIN SCALES - GENERAL: PAINLEVEL_OUTOF10: NO PAIN (0)

## 2024-11-20 NOTE — PROGRESS NOTES
Preventive Care Visit  [facility] DOREEN Baxter PA-C, Family Medicine  Nov 20, 2024      Assessment & Plan     Encounter for Medicare annual wellness exam  Reviewed VS, weight/BMI   Routine screenings see orders  Immunizations: see orders and documentation in HPI. Discussed vaccines coverage as pharmacy benefit.  Health and cancer screening recommendations delivered according to the USPSTF and other appropriate society guidelines  Nutrition and exercise counseling performed.    - PRIMARY CARE FOLLOW-UP SCHEDULING  - COVID-19 12+ (PFIZER)  - REVIEW OF HEALTH MAINTENANCE PROTOCOL ORDERS  - DEXA HIP/PELVIS/SPINE - Future; Future    Type 2 diabetes mellitus without complication, without long-term current use of insulin (H)  A1c : 6.7% - Stable/controlled continue current meds and plan  Meds- see below   F/U visit: 6mo   Labs- see orders/below.  Eye exam- pt is scheduled for Feb 2025  Foot exam- done last visit   Advised lifestyle management-carb controlled eating and consistent exercise/active lifestyle  Diabetes Medication(s)       Biguanides       metFORMIN (GLUCOPHAGE) 1000 MG tablet Take 1 tablet (1,000 mg) by mouth 2 times daily (with meals).           - Lipid panel reflex to direct LDL Fasting; Future  - metFORMIN (GLUCOPHAGE) 1000 MG tablet; Take 1 tablet (1,000 mg) by mouth 2 times daily (with meals).  - simvastatin (ZOCOR) 10 MG tablet; Take 1 tablet (10 mg) by mouth at bedtime.  - Hemoglobin A1c; Future  - Comprehensive metabolic panel (BMP + Alb, Alk Phos, ALT, AST, Total. Bili, TP); Future  - Lipid panel reflex to direct LDL Fasting  - Hemoglobin A1c  - Comprehensive metabolic panel (BMP + Alb, Alk Phos, ALT, AST, Total. Bili, TP)    Peripheral polyneuropathy  Numbness and tingling of both feet  Tingling/burning dorsum of feet bilaterally. Has underlying diabetes, although well controlled. No progression of sx. Sx worse at night.  Normal B12 and TSH done 5mo ago.   Also have noted  a dip in kidney function, will check SPEP and UPEP.   Increased bedtime gabapentin dosing to 200 or 300mg. Continue 100mg am and lunchtime.   - gabapentin (NEURONTIN) 100 MG capsule; Take 2-3 capsules (200-300 mg) by mouth at bedtime AND 1 capsule (100 mg) every morning AND 1 capsule (100 mg) daily (with lunch).  - Protein electrophoresis; Future  - Protein electrophoresis random urine; Future  - Protein immunofixation urine; Future  - Protein Immunofixation Serum; Future  - Protein electrophoresis  - Protein electrophoresis random urine  - Protein immunofixation urine  - Protein Immunofixation Serum    Benign essential hypertension  Systolic running above goal. Not checking [place] BPs.  Increase lisinopril from 10mg to 20mg. Cont hydrochlorothiazide at 12.5mg dose.  MA BP check and labs in 3 weeks.   - Comprehensive metabolic panel (BMP + Alb, Alk Phos, ALT, AST, Total. Bili, TP); Future  - lisinopril-hydrochlorothiazide (ZESTORETIC) 20-12.5 MG tablet; Take 1 tablet by mouth daily.  - PRIMARY CARE FOLLOW-UP SCHEDULING; Future  - Basic metabolic panel; Future  - PRIMARY CARE FOLLOW-UP SCHEDULING; Future  - Comprehensive metabolic panel (BMP + Alb, Alk Phos, ALT, AST, Total. Bili, TP)    Stage 3 chronic kidney disease, unspecified whether stage 3a or 3b CKD (H)  Labs. Good hydration. She is avoiding nsaids.   - Albumin Random Urine Quantitative with Creat Ratio; Future  - Comprehensive metabolic panel (BMP + Alb, Alk Phos, ALT, AST, Total. Bili, TP); Future  - Albumin Random Urine Quantitative with Creat Ratio  - Comprehensive metabolic panel (BMP + Alb, Alk Phos, ALT, AST, Total. Bili, TP)    Endometrial carcinoma (H)- hyst 12/2017  Last note from gyn/onc 2019-  vaginal cuff paps NOT recommended.   Surveillance with annual pelvic exams.  Exam completed today- normal.     Postmenopausal estrogen deficiency  Counseled on osteoporosis, RF, and dexa screening. She will consider. Order placed   - DEXA HIP/PELVIS/SPINE -  Future; Future    Patient has been advised of split billing requirements and indicates understanding: Yes        Nicotine/Tobacco Cessation  She reports that she has been smoking cigarettes. She started smoking about 37 years ago. She has a 15 pack-year smoking history. She has been exposed to tobacco smoke. She has never used smokeless tobacco.  Nicotine/Tobacco Cessation Plan  Information offered: Patient not interested at this time      Counseling  Appropriate preventive services were addressed with this patient via screening, questionnaire, or discussion as appropriate for fall prevention, nutrition, physical activity, Tobacco-use cessation, social engagement, weight loss and cognition.  Checklist reviewing preventive services available has been given to the patient.  Reviewed patient's diet, addressing concerns and/or questions.   The patient was instructed to see the dentist every 6 months.       Follow Up: see above. Additionally patient was instructed to contact clinic for worsening symptoms, non-improvement in time frame discussed, and for questions regarding treatment plan.   For virtual visits, the patient was advised to be seen for in person evaluation if symptoms or condition are worsening or non-improvement as expected.   Tricia Baxter PA-C    Srini Mario is a 64 year old, presenting for the following:  Wellness Visit        11/20/2024     6:53 AM   Additional Questions   Roomed by Jossy Ellison CMA   Accompanied by self         11/20/2024     6:53 AM   Patient Reported Additional Medications   Patient reports taking the following new medications none         HPI  Routine Health Maintenance:  Immunizations - declines flu shot.  Will do covid booster   Mammogram: 08/24  Colonoscopy: 09/24- repeat in 5 yr.   Osteoporosis screening- no fam hx. Smoking socially. Neither parent w/hx of hip fx. Not taking calcium. Yogurtx 1daily, milk x2 daily, and cheese.    Fasting: yes    S/p hysterectomy for  "endometrial cancer- 12/05/2017.   No radiation or chemotherapy   Denies vaginal bleeding, discharge, pelvic pain.  Last Gyn Onc visit- 09/09/2019- Michelle Clark NP- Annual pelvic exam advised (ok to do with primary care), surveillance paps NOT recommended.     Tobacco use- smokes 2 cig when bowling once weekly on Monday nights. Does not want help to quit. Just have to decide to do it.     Diabetes  Last a1c: 6.4% in June 2024   Medications:  Diabetes Medication(s)       Biguanides       metFORMIN (GLUCOPHAGE) 1000 MG tablet Take 1 tablet (1,000 mg) by mouth 2 times daily (with meals)          Blood sugars: running \"good\"-  before dinner and 120 in the am fasting   Hypoglycemia: no   ACEi/ARB: yes  Statin: yes  Eye exam: coming up Feb 2025  Foot concerns/exam: yes-  Neuropathy- burning feeling tops of feet bilaterally. Not on bottoms of feet. Bothers at night. Started a few years ago. Normal B12 and TSh. Last visit started on gabapentin 100mg TID. It does help. She would like to try a higher dose at bedtime for nighttime sx. Sister Hayde has neuropathy also. No side effects on the gabapentin.       Health Care Directive  Patient does not have a Health Care Directive: Discussed advance care planning with patient; information given to patient to review.      11/19/2024   General Health   How would you rate your overall physical health? Good   Feel stress (tense, anxious, or unable to sleep) Not at all            11/19/2024   Nutrition   Diet: Regular (no restrictions)            11/19/2024   Exercise   Days per week of moderate/strenous exercise 5 days   Average minutes spent exercising at this level 0 min            11/19/2024   Social Factors   Frequency of gathering with friends or relatives Once a week   Worry food won't last until get money to buy more No   Food not last or not have enough money for food? No   Do you have housing? (Housing is defined as stable permanent housing and does not include staying " ouside in a car, in a tent, in an abandoned building, in an overnight shelter, or couch-surfing.) Yes   Are you worried about losing your housing? No   Lack of transportation? No   Unable to get utilities (heat,electricity)? No            11/19/2024   Fall Risk   Fallen 2 or more times in the past year? No     No    Trouble with walking or balance? No     No        Patient-reported    Multiple values from one day are sorted in reverse-chronological order          11/19/2024   Activities of Daily Living- Home Safety   Needs help with the following daily activites None of the above   Safety concerns in the home None of the above            11/19/2024   Dental   Dentist two times every year? (!) NO            11/19/2024   Hearing Screening   Hearing concerns? None of the above            11/19/2024   Driving Risk Screening   Patient/family members have concerns about driving No            11/19/2024   General Alertness/Fatigue Screening   Have you been more tired than usual lately? No            11/19/2024   Urinary Incontinence Screening   Bothered by leaking urine in past 6 months No            11/19/2024   TB Screening   Were you born outside of the [place]? No            Today's PHQ-2 Score:       11/19/2024     8:40 AM   PHQ-2 ( 1999 Pf[employer])   Q1: Little interest or pleasure in doing things 0    Q2: Feeling down, depressed or hopeless 0    PHQ-2 Score 0    Q1: Little interest or pleasure in doing things Not at all   Q2: Feeling down, depressed or hopeless Not at all   PHQ-2 Score 0       Patient-reported           11/19/2024   Substance Use   If I could quit smoking, I would Neutral   I want to quit somking, worry about health affects Neutral   Willing to make a plan to quit smoking Neutral   Willing to cut down before quitting Somewhat agree   Alcohol more than 3/day or more than 7/wk No   Do you have a current opioid prescription? No   How severe/bad is pain from 1 to 10? 0/10 (No Pain)   Do you use any other  substances recreationally? (!) DECLINE        Social History     Tobacco Use    Smoking status: Some Days     Current packs/day: 0.00     Average packs/day: 0.5 packs/day for 30.0 years (15.0 ttl pk-yrs)     Types: Cigarettes     Start date: 1987     Last attempt to quit: 2017     Years since quittin.7     Passive exposure: Current    Smokeless tobacco: Never    Tobacco comments:     smoked 30 years- half pack a day. States that she does not smoke very often anymore (24)- 2 cig per week    Vaping Use    Vaping status: Never Used   Substance Use Topics    Alcohol use: Yes     Comment: social    Drug use: No           2024   LAST FHS-7 RESULTS   1st degree relative breast or ovarian cancer No   Any relative bilateral breast cancer No   Any male have breast cancer No   Any ONE woman have BOTH breast AND ovarian cancer No   Any woman with breast cancer before 50yrs No   2 or more relatives with breast AND/OR ovarian cancer No   2 or more relatives with breast AND/OR bowel cancer Yes           Mammogram Screening - Mammogram every 1-2 years updated in Health Maintenance based on mutual decision making      History of abnormal Pap smear: YES - reflected in Problem List and Health Maintenance accordingly        Latest Ref Rng & Units 10/17/2017     4:08 PM 10/17/2017     4:00 PM   PAP / HPV   PAP (Historical)  ASC-US     HPV 16 DNA NEG^Negative  Negative    HPV 18 DNA NEG^Negative  Negative    Other HR HPV NEG^Negative  Negative      ASCVD Risk   The 10-year ASCVD risk score (Rene MCGEE, et al., 2019) is: 33.4%    Values used to calculate the score:      Age: 64 years      Sex: Female      Is Non- : No      Diabetic: Yes      Tobacco smoker: Yes      Systolic Blood Pressure: 152 mmHg      Is BP treated: Yes      HDL Cholesterol: 39 mg/dL      Total Cholesterol: 136 mg/dL            Reviewed and updated as needed this visit by Provider                    Past Medical  History:   Diagnosis Date    ASCUS of cervix with negative high risk HPV 10/17/2017    Diabetes (H)     oral meds    Endometrial cancer (H)     HTN (hypertension)     Migraine     Obesity     Thyroid disease 7/2005    Graves     Past Surgical History:   Procedure Laterality Date    ABDOMEN SURGERY      COLONOSCOPY N/A 9/16/2024    Procedure: COLONOSCOPY, WITH POLYPECTOMY;  Surgeon: Roberto Cheng DO;  Location: [facility]    CYSTOSCOPY N/A 12/05/2017    Procedure: CYSTOSCOPY;;  Surgeon: Facundo Parekh MD;  Location: [facility]    DAVINCI HYSTERECTOMY TOTAL, BILATERAL SALPINGO-OOPHORECTOMY, NODE DISSECTION, COMBINED N/A 12/05/2017    Procedure: COMBINED DAVINCI HYSTERECTOMY TOTAL, SALPINGO-OOPHORECTOMY, NODE DISSECTION;  DaVinci Assisted Total Laparoscopic Hysterectomy, Right Salpingo-Oophorectomy, Lysis of Adhesions, Cystoscopy;  Surgeon: Facundo Parekh MD;  Location: [facility]    HERNIORRHAPHY, INCISIONAL, ROBOT-ASSISTED, LAPAROSCOPIC, USING DA JUVENTINO XI N/A 5/24/2023    Procedure: HERNIORRHAPHY, INCISIONAL, ROBOT-ASSISTED, LAPAROSCOPIC, USING DA JUVENTINO XI;  Surgeon: Anival Williamson MD;  Location: [facility]    HYSTERECTOMY, PAP NO LONGER INDICATED      LAPAROSCOPIC HYSTERECTOMY TOTAL  12/2017    oopherectomy Left     13 lb benign mass removal    ORTHOPEDIC SURGERY       Lab work is in process  Labs reviewed in EPIC  BP Readings from Last 3 Encounters:   11/20/24 (!) 152/64   09/16/24 (!) 166/76   06/12/24 132/76    Wt Readings from Last 3 Encounters:   11/20/24 80.7 kg (178 lb)   06/12/24 80.8 kg (178 lb 2 oz)   11/01/23 80.7 kg (178 lb)                  Patient Active Problem List   Diagnosis    ASCUS of cervix with negative high risk HPV    Endometrial carcinoma (H)- hyst 12/2017    S/P hysterectomy    Type 2 diabetes mellitus without complication, without long-term current use of insulin (H)    History of Graves' disease    CKD (chronic kidney disease) stage 3, GFR 30-59 ml/min (H)     Past Surgical History:    Procedure Laterality Date    ABDOMEN SURGERY      COLONOSCOPY N/A 2024    Procedure: COLONOSCOPY, WITH POLYPECTOMY;  Surgeon: Roberto Cheng DO;  Location: [facility]    CYSTOSCOPY N/A 2017    Procedure: CYSTOSCOPY;;  Surgeon: Facundo Parekh MD;  Location: [facility]    DAVINCI HYSTERECTOMY TOTAL, BILATERAL SALPINGO-OOPHORECTOMY, NODE DISSECTION, COMBINED N/A 2017    Procedure: COMBINED DAVINCI HYSTERECTOMY TOTAL, SALPINGO-OOPHORECTOMY, NODE DISSECTION;  DaVinci Assisted Total Laparoscopic Hysterectomy, Right Salpingo-Oophorectomy, Lysis of Adhesions, Cystoscopy;  Surgeon: Facundo Parekh MD;  Location: [facility]    HERNIORRHAPHY, INCISIONAL, ROBOT-ASSISTED, LAPAROSCOPIC, USING DA JUVENTINO XI N/A 2023    Procedure: HERNIORRHAPHY, INCISIONAL, ROBOT-ASSISTED, LAPAROSCOPIC, USING DA JUVENTINO XI;  Surgeon: Anival Williamson MD;  Location: [facility]    HYSTERECTOMY, PAP NO LONGER INDICATED      LAPAROSCOPIC HYSTERECTOMY TOTAL  2017    oopherectomy Left     13 lb benign mass removal    ORTHOPEDIC SURGERY         Social History     Tobacco Use    Smoking status: Some Days     Current packs/day: 0.00     Average packs/day: 0.5 packs/day for 30.0 years (15.0 ttl pk-yrs)     Types: Cigarettes     Start date: 1987     Last attempt to quit: 2017     Years since quittin.7     Passive exposure: Current    Smokeless tobacco: Never    Tobacco comments:     smoked 30 years- half pack a day. States that she does not smoke very often anymore (24)- 2 cig per week    Substance Use Topics    Alcohol use: Yes     Comment: social     Family History   Problem Relation Age of Onset    Hypertension Mother     Hyperlipidemia Mother     Cerebrovascular Disease Mother     Macular Degeneration Mother     Influenza/Pneumonia Father 62    Diabetes Father     Kidney Disease Sister     Diabetes Sister     Depression Sister     Diabetes Brother     Diabetes Brother     Colon Cancer Brother 64         Current  Outpatient Medications   Medication Sig Dispense Refill    alcohol swab prep pads Use to swab area of injection/jennifer as directed. 100 each 0    aspirin 81 MG EC tablet Take 1 tablet (81 mg) by mouth daily 90 tablet 3    blood glucose (NO BRAND SPECIFIED) lancets standard Use to test blood sugar 1 times daily or as directed. 100 lancet. 5    blood glucose (NO BRAND SPECIFIED) test strip Use to test blood sugar 1 times daily or as directed. 100 strip 5    blood glucose monitoring (NO BRAND SPECIFIED) meter device kit Use to test blood sugar 1 times daily or as directed. 1 kit 0    gabapentin (NEURONTIN) 100 MG capsule Take 2-3 capsules (200-300 mg) by mouth at bedtime AND 1 capsule (100 mg) every morning AND 1 capsule (100 mg) daily (with lunch). 450 capsule 1    lisinopril-hydrochlorothiazide (ZESTORETIC) 10-12.5 MG tablet Take 1 tablet by mouth daily 90 tablet 1    lisinopril-hydrochlorothiazide (ZESTORETIC) 20-12.5 MG tablet Take 1 tablet by mouth daily. 90 tablet 0    metFORMIN (GLUCOPHAGE) 1000 MG tablet Take 1 tablet (1,000 mg) by mouth 2 times daily (with meals). 180 tablet 1    simvastatin (ZOCOR) 10 MG tablet Take 1 tablet (10 mg) by mouth at bedtime. 90 tablet 3     Allergies   Allergen Reactions    Latex Itching     Recent Labs   Lab Test 11/20/24  0802 06/21/24  0724 06/12/24  0730 11/01/23  0802 05/17/23  0904 04/06/23  0830 04/06/23  0830 10/05/22  0746 03/16/22  1023 12/09/20  0711 09/10/20  0856 12/09/19  0740 06/28/19  0813   A1C 6.7*  --  6.4* 6.6* 6.5*  --  6.7*   < > 6.8*   < > 6.5*   < > 6.7*   LDL  --   --   --  64  --   --  38  --  38   < >  --    < >  --    HDL  --   --   --  39*  --   --  38*  --  50   < >  --    < >  --    TRIG  --   --   --  167*  --   --  147  --  153*   < >  --    < >  --    ALT  --   --   --  21  --   --  28  --  32  --   --   --   --    CR  --  1.09* 1.19* 1.04* 0.93  --  0.98  --  0.91   < > 0.91  --  0.74   GFRESTIMATED  --  56* 50* 59* 68  --  64  --  70   < > 67  --  " 87   GFRESTBLACK  --   --   --   --   --   --   --   --   --   --  78  --  >90   POTASSIUM  --  4.5 4.6 4.4 4.9   < > 4.1  --  4.6   < > 4.2  --  4.4   TSH  --   --  1.13  --  0.69  --   --   --   --   --   --   --   --     < > = values in this interval not displayed.      Current providers sharing in care for this patient include:  Patient Care Team:  Tricia Baxter PA-C as PCP - General (Family Medicine)  Tricia Baxter PA-C as Assigned PCP  Anival Williamson MD as Assigned Heart and Vascular Provider    The following health maintenance items are reviewed in Epic and correct as of today:  Health Maintenance   Topic Date Due    DEXA  Never done    URINALYSIS  Never done    LUNG CANCER SCREENING  Never done    RSV VACCINE (1 - Risk 60-74 years 1-dose series) Never done    INFLUENZA VACCINE (1) 09/01/2024    COVID-19 Vaccine (5 - 2024-25 season) 09/01/2024    LIPID  11/01/2024    MICROALBUMIN  11/01/2024    ANNUAL REVIEW OF HM ORDERS  11/01/2024    NICOTINE/TOBACCO CESSATION COUNSELING Q 1 YR  11/01/2024    MEDICARE ANNUAL WELLNESS VISIT  11/01/2024    A1C  12/12/2024    DIABETIC FOOT EXAM  06/12/2025    EYE EXAM  06/12/2025    HEMOGLOBIN  06/12/2025    BMP  06/21/2025    FALL RISK ASSESSMENT  11/20/2025    MAMMO SCREENING  08/21/2026    DTAP/TDAP/TD IMMUNIZATION (2 - Td or Tdap) 03/26/2028    ADVANCE CARE PLANNING  11/01/2028    COLORECTAL CANCER SCREENING  09/16/2029    HEPATITIS C SCREENING  Completed    PHQ-2 (once per calendar year)  Completed    Pneumococcal Vaccine: 65+ Years  Completed    ZOSTER IMMUNIZATION  Completed    HPV IMMUNIZATION  Aged Out    MENINGITIS IMMUNIZATION  Aged Out    RSV MONOCLONAL ANTIBODY  Aged Out    PAP  Discontinued         Review of Systems  Constitutional, HEENT, cardiovascular, pulmonary, gi and gu systems are negative, except as otherwise noted.     Objective    Exam  BP (!) 152/64   Pulse 70   Temp 97.9  F (36.6  C) (Temporal)   Resp 20   Ht 1.621 m (5' 3.82\")  " " Wt 80.7 kg (178 lb)   LMP  (LMP Unknown)   SpO2 99%   Breastfeeding No   BMI 30.73 kg/m     Estimated body mass index is 30.73 kg/m  as calculated from the following:    Height as of this encounter: 1.621 m (5' 3.82\").    Weight as of this encounter: 80.7 kg (178 lb).    Physical Exam  GENERAL: alert and no distress  EYES: Eyes grossly normal to inspection, PERRL and conjunctivae and sclerae normal  HENT: ear canals and TM's normal, nose and mouth without ulcers or lesions  NECK: no adenopathy, no asymmetry, masses, or scars  RESP: lungs clear to auscultation - no rales, rhonchi or wheezes  BREAST: normal without masses, tenderness or nipple discharge and no palpable axillary masses or adenopathy  CV: regular rate and rhythm, normal S1 S2, no S3 or S4, no murmur, click or rub, no peripheral edema  ABDOMEN: soft, nontender, no hepatosplenomegaly, no masses and bowel sounds normal   (female): normal female external genitalia, normal urethral meatus, normal vaginal mucosa, posterior vaginal wall bulge to introitus  MS: no gross musculoskeletal defects noted, no edema  SKIN: no suspicious lesions or rashes  NEURO: Normal strength and tone, mentation intact and speech normal  PSYCH: mentation appears normal, affect normal/bright  LYMPH: no cervical, supraclavicular, axillary, or inguinal adenopathy        11/20/2024   Mini Cog   Clock Draw Score 2 Normal   3 Item Recall 3 objects recalled   Mini Cog Total Score 5        Results for orders placed or performed in visit on 11/20/24   Hemoglobin A1c     Status: Abnormal   Result Value Ref Range    Estimated Average Glucose 146 (H) <117 mg/dL    Hemoglobin A1C 6.7 (H) 0.0 - 5.6 %   Protein electrophoresis     Status: None (In process)    Narrative    The following orders were created for panel order Protein electrophoresis.  Procedure                               Abnormality         Status                     ---------                               -----------        "  ------                     Total Protein, Serum for...[[id number]]                      In process                 Protein Electrophoresis,...[[id number]]                      In process                   Please view results for these tests on the individual orders.           Signed Electronically by: Tricia Baxter PA-C

## 2024-11-20 NOTE — PATIENT INSTRUCTIONS
Patient Education     Instructions from Today's Visit:  Increasing the lisinopril dose from 10mg to 20mg daily.   Keeping the hydrochlorothiazide portion of the pill at the same dose  Please return in 3 weeks to see my nurse to repeat a blood pressure AND to do a kidney blood test on the new dose.     Higher dose on gabapentin at bedtime - can try 200mg or 300mg.   Continue on the 100mg in the morning and mid-day    Refilling metformin and simvastatin     To schedule your Imaging Test or Specialty Referral please call: dexa for bone density   [facility]   Radiology (imaging tests:  mammogram, ultrasound, CT, MRI): [phone]  Speciality consultation [phone]  [address]  [facility] [address]    [facility]  Radiology (imaging): [phone]  Specialty consultation [phone]  Colonoscopy [phone]  [address] [place] [address]     Other Mammogram Options:  North Region Locations:  [place], [place], [place], [place]*, [place]* ([facility]), [facility]*-- Call to schedule [phone]  South Region Locations: [place], [place], [facility] ([place], [place]*, [facility] [place]*, [facility] [place]*, [place], [place], [place]-- Call to schedule [phone]   [facility] Locations: [place]* and [facility]-- Call to schedule [phone]  *locations marked with a star have 3D mammography available     General Instructions After Your Visit  If you have been seen for a concern and are worse or not improving, please schedule a follow-up visit or reach out to a member of our care team or nurses if it is urgent.  Nurse advice is available  by calling [phone].  For emergencies, please call [phone].    Test results  You may see your lab or test results before we can make  recommendations. This is common, as sometimes we are awaiting other labs to return or we are out of office on a particular day. Please be patient, and if you don't see a response from me or one of my colleagues within 2-3 business days, and you have a specific concern, please send us a message.    Refills  If you have run out of refills, please schedule follow-up visits. This is generally an indication that you are due for a follow-up visit. All prescriptions are only valid for 1 year and need a visit annually. Most mental health and chronic diseases we are treating (diabetes, high blood pressure, etc) require some type of visit every 6 months. We are now offering telephone or video visits! However, if a physical exams are needed or it is a complex concern, we may ask you to be seen in person.    Physicals & Preventative Visits   These appointment slots fill up fast. Please consider scheduling these 2-3 months in advance to allow for the appointment time that fits you best. If you have medications ordered or other issues addressed that are not preventive at these visits, please be aware there are extra costs associated with this.       Preventive Care Advice   This is general advice given by our system to help you stay healthy. However, your care team may have specific advice just for you. Please talk to your care team about your preventive care needs.  Nutrition  Eat 5 or more servings of fruits and vegetables each day.  Try wheat bread, brown rice and whole grain pasta (instead of white bread, rice, and pasta).  Get enough calcium and vitamin D. Check the label on foods and aim for 100% of the RDA (recommended daily allowance).  Lifestyle  Exercise at least 150 minutes each week  (30 minutes a day, 5 days a week).  Do muscle strengthening activities 2 days a week. These help control your weight and prevent disease.  No smoking.  Wear sunscreen to prevent skin cancer.  Have a dental exam and cleaning every 6  months.  Yearly exams  See your health care team every year to talk about:  Any changes in your health.  Any medicines your care team has prescribed.  Preventive care, family planning, and ways to prevent chronic diseases.  Shots (vaccines)   HPV shots (up to age 26), if you've never had them before.  Hepatitis B shots (up to age 59), if you've never had them before.  COVID-19 shot: Get this shot when it's due.  Flu shot: Get a flu shot every year.  Tetanus shot: Get a tetanus shot every 10 years.  Pneumococcal, hepatitis A, and RSV shots: Ask your care team if you need these based on your risk.  Shingles shot (for age 50 and up)  General health tests  Diabetes screening:  Starting at age 35, Get screened for diabetes at least every 3 years.  If you are younger than age 35, ask your care team if you should be screened for diabetes.  Cholesterol test: At age 39, start having a cholesterol test every 5 years, or more often if advised.  Bone density scan (DEXA): At age 50, ask your care team if you should have this scan for osteoporosis (brittle bones).  Hepatitis C: Get tested at least once in your life.  STIs (sexually transmitted infections)  Before age 24: Ask your care team if you should be screened for STIs.  After age 24: Get screened for STIs if you're at risk. You are at risk for STIs (including HIV) if:  You are sexually active with more than one person.  You don't use condoms every time.  You or a partner was diagnosed with a sexually transmitted infection.  If you are at risk for HIV, ask about PrEP medicine to prevent HIV.  Get tested for HIV at least once in your life, whether you are at risk for HIV or not.  Cancer screening tests  Cervical cancer screening: If you have a cervix, begin getting regular cervical cancer screening tests starting at age 21.  Breast cancer scan (mammogram): If you've ever had breasts, begin having regular mammograms starting at age 40. This is a scan to check for breast  cancer.  Colon cancer screening: It is important to start screening for colon cancer at age 45.  Have a colonoscopy test every 10 years (or more often if you're at risk) Or, ask your provider about stool tests like a FIT test every year or Cologuard test every 3 years.  To learn more about your testing options, visit:   .  For help making a decision, visit:   https://bit.ly/bo71555.  Prostate cancer screening test: If you have a prostate, ask your care team if a prostate cancer screening test (PSA) at age 55 is right for you.  Lung cancer screening: If you are a current or former smoker ages 50 to 80, ask your care team if ongoing lung cancer screenings are right for you.  For informational purposes only. Not to replace the advice of your health care provider. Copyright   2023 [employer]. All rights reserved. Clinically reviewed by the [facility] Transitions Program. [employer] 504512 - REV 01/24.

## 2024-11-20 NOTE — TELEPHONE ENCOUNTER
Per follow up orders placed at visit today blood pressure check with MA is to be on or after 12/4/24. Patient is on schedule today but saw provider today; she needs to reschedule. Sent mychart.      Jossy Ellison CMA (AAMA)

## 2024-11-21 ENCOUNTER — PATIENT OUTREACH (OUTPATIENT)
Dept: CARE COORDINATION | Facility: CLINIC | Age: 64
End: 2024-11-21
Payer: COMMERCIAL

## 2024-11-25 ENCOUNTER — APPOINTMENT (OUTPATIENT)
Dept: LAB | Facility: OTHER | Age: 64
End: 2024-11-25
Payer: COMMERCIAL

## 2024-11-26 ENCOUNTER — MYC MEDICAL ADVICE (OUTPATIENT)
Dept: FAMILY MEDICINE | Facility: CLINIC | Age: 64
End: 2024-11-26
Payer: COMMERCIAL

## 2024-11-26 NOTE — TELEPHONE ENCOUNTER
Patient is wondering with her results being normal today does she still need to do labs in December?    Jossy Ellison CMA ([facility])

## 2024-12-02 LAB
LOCATION OF TASK: NORMAL
PROT ELPH PNL UR ELPH: NORMAL

## 2024-12-11 ENCOUNTER — LAB (OUTPATIENT)
Dept: LAB | Facility: CLINIC | Age: 64
End: 2024-12-11
Payer: COMMERCIAL

## 2024-12-11 ENCOUNTER — ALLIED HEALTH/NURSE VISIT (OUTPATIENT)
Dept: FAMILY MEDICINE | Facility: CLINIC | Age: 64
End: 2024-12-11
Payer: COMMERCIAL

## 2024-12-11 VITALS — SYSTOLIC BLOOD PRESSURE: 132 MMHG | DIASTOLIC BLOOD PRESSURE: 68 MMHG

## 2024-12-11 DIAGNOSIS — I10 BENIGN ESSENTIAL HYPERTENSION: ICD-10-CM

## 2024-12-11 DIAGNOSIS — I10 BENIGN ESSENTIAL HYPERTENSION: Primary | ICD-10-CM

## 2024-12-11 LAB
ANION GAP SERPL CALCULATED.3IONS-SCNC: 11 MMOL/L (ref 7–15)
BUN SERPL-MCNC: 24.4 MG/DL (ref 8–23)
CALCIUM SERPL-MCNC: 9.8 MG/DL (ref 8.8–10.4)
CHLORIDE SERPL-SCNC: 105 MMOL/L (ref 98–107)
CREAT SERPL-MCNC: 0.97 MG/DL (ref 0.51–0.95)
EGFRCR SERPLBLD CKD-EPI 2021: 64 ML/MIN/1.73M2
GLUCOSE SERPL-MCNC: 108 MG/DL (ref 70–99)
HCO3 SERPL-SCNC: 26 MMOL/L (ref 22–29)
POTASSIUM SERPL-SCNC: 4.5 MMOL/L (ref 3.4–5.3)
SODIUM SERPL-SCNC: 142 MMOL/L (ref 135–145)

## 2024-12-11 PROCEDURE — 80048 BASIC METABOLIC PNL TOTAL CA: CPT

## 2024-12-11 PROCEDURE — 99207 PR NO CHARGE NURSE ONLY: CPT

## 2024-12-11 PROCEDURE — 36415 COLL VENOUS BLD VENIPUNCTURE: CPT

## 2024-12-11 NOTE — PROGRESS NOTES
Shelbi Howard is a 64 year old patient who comes in today for a Blood Pressure check.  Initial BP:  /68 (BP Location: Left arm, Patient Position: Chair, Cuff Size: Adult Regular)   LMP  (LMP Unknown)        Disposition: results routed to provider

## 2024-12-12 NOTE — PROGRESS NOTES
BP at goal on new dose lisinopril-hydrochlorothiazide .  Labs also completed and normal.   Will refill medication when needed.  Tricia Baxter PA-C

## 2025-01-21 DIAGNOSIS — I10 BENIGN ESSENTIAL HYPERTENSION: ICD-10-CM

## 2025-01-22 RX ORDER — LISINOPRIL AND HYDROCHLOROTHIAZIDE 12.5; 2 MG/1; MG/1
1 TABLET ORAL DAILY
Qty: 90 TABLET | Refills: 2 | Status: SHIPPED | OUTPATIENT
Start: 2025-01-22

## 2025-02-01 ENCOUNTER — TRANSFERRED RECORDS (OUTPATIENT)
Dept: MULTI SPECIALTY CLINIC | Facility: CLINIC | Age: 65
End: 2025-02-01

## 2025-02-01 LAB — RETINOPATHY: NORMAL

## 2025-04-08 ENCOUNTER — MYC MEDICAL ADVICE (OUTPATIENT)
Dept: FAMILY MEDICINE | Facility: CLINIC | Age: 65
End: 2025-04-08
Payer: COMMERCIAL

## 2025-04-08 NOTE — TELEPHONE ENCOUNTER
Patient Quality Outreach    Patient is due for the following:   Diabetes -  A1C, eGFR, and Diabetic Follow-Up Visit    Action(s) Taken:   Schedule a office visit for Diabetes follow up     Type of outreach:    Sent Fondu message.    Questions for provider review:    None         Zeinab Leal MA  Chart routed to Care Team.

## 2025-05-17 ENCOUNTER — HEALTH MAINTENANCE LETTER (OUTPATIENT)
Age: 65
End: 2025-05-17

## 2025-05-19 ENCOUNTER — OFFICE VISIT (OUTPATIENT)
Dept: FAMILY MEDICINE | Facility: CLINIC | Age: 65
End: 2025-05-19
Payer: COMMERCIAL

## 2025-05-19 ENCOUNTER — RESULTS FOLLOW-UP (OUTPATIENT)
Dept: FAMILY MEDICINE | Facility: CLINIC | Age: 65
End: 2025-05-19

## 2025-05-19 VITALS
RESPIRATION RATE: 16 BRPM | HEART RATE: 65 BPM | WEIGHT: 177.3 LBS | DIASTOLIC BLOOD PRESSURE: 64 MMHG | HEIGHT: 64 IN | TEMPERATURE: 97 F | BODY MASS INDEX: 30.27 KG/M2 | SYSTOLIC BLOOD PRESSURE: 116 MMHG | OXYGEN SATURATION: 99 %

## 2025-05-19 DIAGNOSIS — N18.31 STAGE 3A CHRONIC KIDNEY DISEASE (H): ICD-10-CM

## 2025-05-19 DIAGNOSIS — Z78.0 POSTMENOPAUSAL ESTROGEN DEFICIENCY: ICD-10-CM

## 2025-05-19 DIAGNOSIS — E11.9 TYPE 2 DIABETES MELLITUS WITHOUT COMPLICATION, WITHOUT LONG-TERM CURRENT USE OF INSULIN (H): Primary | ICD-10-CM

## 2025-05-19 DIAGNOSIS — G62.9 PERIPHERAL POLYNEUROPATHY: ICD-10-CM

## 2025-05-19 DIAGNOSIS — Z23 NEED FOR VACCINATION: ICD-10-CM

## 2025-05-19 LAB
ALBUMIN MFR UR ELPH: 11.4 MG/DL
ALBUMIN UR-MCNC: NEGATIVE MG/DL
ANION GAP SERPL CALCULATED.3IONS-SCNC: 14 MMOL/L (ref 7–15)
APPEARANCE UR: CLEAR
BILIRUB UR QL STRIP: NEGATIVE
BUN SERPL-MCNC: 38.2 MG/DL (ref 8–23)
CALCIUM SERPL-MCNC: 9.7 MG/DL (ref 8.8–10.4)
CHLORIDE SERPL-SCNC: 105 MMOL/L (ref 98–107)
COLOR UR AUTO: YELLOW
CREAT SERPL-MCNC: 1.01 MG/DL (ref 0.51–0.95)
CREAT UR-MCNC: 72.4 MG/DL
CREAT UR-MCNC: 72.4 MG/DL
EGFRCR SERPLBLD CKD-EPI 2021: 61 ML/MIN/1.73M2
EST. AVERAGE GLUCOSE BLD GHB EST-MCNC: 148 MG/DL
GLUCOSE SERPL-MCNC: 135 MG/DL (ref 70–99)
GLUCOSE UR STRIP-MCNC: NEGATIVE MG/DL
HBA1C MFR BLD: 6.8 % (ref 0–5.6)
HCO3 SERPL-SCNC: 22 MMOL/L (ref 22–29)
HGB BLD-MCNC: 12.5 G/DL (ref 11.7–15.7)
HGB UR QL STRIP: NEGATIVE
KETONES UR STRIP-MCNC: NEGATIVE MG/DL
LEUKOCYTE ESTERASE UR QL STRIP: NEGATIVE
MCV RBC AUTO: 92 FL (ref 78–100)
MICROALBUMIN UR-MCNC: 48.6 MG/L
MICROALBUMIN/CREAT UR: 67.13 MG/G CR (ref 0–25)
NITRATE UR QL: NEGATIVE
PH UR STRIP: 5.5 [PH] (ref 5–7)
POTASSIUM SERPL-SCNC: 4.4 MMOL/L (ref 3.4–5.3)
PROT/CREAT 24H UR: 0.16 MG/MG CR (ref 0–0.2)
SODIUM SERPL-SCNC: 141 MMOL/L (ref 135–145)
SP GR UR STRIP: 1.02 (ref 1–1.03)
TSH SERPL DL<=0.005 MIU/L-ACNC: 0.88 UIU/ML (ref 0.3–4.2)
UROBILINOGEN UR STRIP-ACNC: 0.2 E.U./DL
VIT B12 SERPL-MCNC: 573 PG/ML (ref 232–1245)

## 2025-05-19 PROCEDURE — 83036 HEMOGLOBIN GLYCOSYLATED A1C: CPT | Performed by: PHYSICIAN ASSISTANT

## 2025-05-19 PROCEDURE — 84443 ASSAY THYROID STIM HORMONE: CPT | Performed by: PHYSICIAN ASSISTANT

## 2025-05-19 PROCEDURE — 84156 ASSAY OF PROTEIN URINE: CPT | Performed by: PHYSICIAN ASSISTANT

## 2025-05-19 PROCEDURE — 85018 HEMOGLOBIN: CPT | Performed by: PHYSICIAN ASSISTANT

## 2025-05-19 PROCEDURE — 1126F AMNT PAIN NOTED NONE PRSNT: CPT | Performed by: PHYSICIAN ASSISTANT

## 2025-05-19 PROCEDURE — 82570 ASSAY OF URINE CREATININE: CPT | Performed by: PHYSICIAN ASSISTANT

## 2025-05-19 PROCEDURE — 36415 COLL VENOUS BLD VENIPUNCTURE: CPT | Performed by: PHYSICIAN ASSISTANT

## 2025-05-19 PROCEDURE — 82043 UR ALBUMIN QUANTITATIVE: CPT | Performed by: PHYSICIAN ASSISTANT

## 2025-05-19 PROCEDURE — 3078F DIAST BP <80 MM HG: CPT | Performed by: PHYSICIAN ASSISTANT

## 2025-05-19 PROCEDURE — 80048 BASIC METABOLIC PNL TOTAL CA: CPT | Performed by: PHYSICIAN ASSISTANT

## 2025-05-19 PROCEDURE — 99214 OFFICE O/P EST MOD 30 MIN: CPT | Performed by: PHYSICIAN ASSISTANT

## 2025-05-19 PROCEDURE — 3074F SYST BP LT 130 MM HG: CPT | Performed by: PHYSICIAN ASSISTANT

## 2025-05-19 PROCEDURE — 82607 VITAMIN B-12: CPT | Performed by: PHYSICIAN ASSISTANT

## 2025-05-19 PROCEDURE — G2211 COMPLEX E/M VISIT ADD ON: HCPCS | Performed by: PHYSICIAN ASSISTANT

## 2025-05-19 PROCEDURE — 81003 URINALYSIS AUTO W/O SCOPE: CPT | Performed by: PHYSICIAN ASSISTANT

## 2025-05-19 RX ORDER — GABAPENTIN 100 MG/1
CAPSULE ORAL
Qty: 450 CAPSULE | Refills: 1 | Status: SHIPPED | OUTPATIENT
Start: 2025-05-19

## 2025-05-19 ASSESSMENT — PAIN SCALES - GENERAL: PAINLEVEL_OUTOF10: NO PAIN (0)

## 2025-05-19 NOTE — PATIENT INSTRUCTIONS
Instructions from Today's Visit:  Update labs today     Refilled medications    Medicare provides best coverage for certain vaccines when given through your pharmacy- RSV.      Plan for renal ultrasound and dexa (bone density scan)    Bone health- Preventing Osteopenia and Osteoporosis  Steps you can take:  Regular weight bearing exercising- walking, running, weight lifting or body weight strength exercises  Total daily calcium intake of 1200 to 1500mg and vit D3 800-1000 international unit(s) . Daily calcium can come from a combination of dietary calcium (from milk, yogurt, cheese, leafy greens) and from a calcium supplement.    DEXA is a bone density scan to measure and monitor density .  International Osteoporosis Foundation Calcium Calculator- to calculate your average weekly intake of calcium.   https://www.osteoporosis.foundation/educational-hub/topic/calcium-calculator    To schedule your Imaging Test or Specialty Referral please call:  [facility]  Radiology (imaging): [phone]  Specialty consultation [phone]  Colonoscopy [phone]  [address] [place] [address] [place] [address]     Other Mammogram Options:  [place] Region Locations:  [place], [place], [place], [place]*, [place]* ([facility]), [facility]*-- Call to schedule [phone]  South Region Locations: [place], [facility])*, [place]*, [facility] [place] [place]*, [place], [place], [place]-- Call to schedule [phone]   [facility] Locations: [place]* and [facility]-- Call to schedule [phone]  *locations marked with a star have 3D mammography available     General Instructions After Your Visit  If you have been seen for a concern and are worsening or not improving as expected, please schedule a follow-up visit or reach out to a  member of our care team or nurses if it is urgent.  We have Nurse advice available 24/7 by calling [phone].  For emergencies, please call [phone].    My Clinic Hours  I am in the office Mondays, Wednesdays, and Thursdays. Messages received outside of normal clinic hours and on my days out of the office will be reviewed by our [place] care team, but may not be addressed by me personally until I am back in the office. If you have concerns that cannot wait for my return please call the Nurse advise line [phone].    Test results  You may see your lab or test results before we can make recommendations. This is common, as sometimes we are awaiting on other labs to return or we are out of office on a particular day. Please be patient, and if you don't see a response from me or one of my colleagues within 2-3 business days, and you have a specific concern, please send us a message.    Refills  If you have run out of refills, please schedule a visit. This is generally an indication that you are due for a follow-up visit. All prescriptions are only valid for 1 year from the date written and need a visit annually to renew. Most mental health and chronic diseases we are treating (diabetes, high blood pressure, etc) require some type of visit every 6 months. We are now offering video visits! However, if physical exams are needed or it is a complex concern, we may ask you to be seen in person.    Physicals & Preventative Visits   These appointment slots fill up fast. Please consider scheduling these 2-3 months in advance to allow for the appointment time that fits you best. If you have medications ordered or other issues addressed that are not preventive at these visits, please be aware there are extra costs associated with this.

## 2025-05-19 NOTE — PROGRESS NOTES
Assessment & Plan     Type 2 diabetes mellitus without complication, without long-term current use of insulin (H)  A1c : 6.8% -Stable/controlled continue current meds and plan  F/U visit:  6mo   Labs- see orders/below.  Eye exam- UTD.  Need copy. Had done in Feb 2025- she will get that to us.   Foot exam- UTD  Advised lifestyle management-carb controlled eating and consistent exercise/active lifestyle  Diabetes Medication(s)       Biguanides       metFORMIN (GLUCOPHAGE) 1000 MG tablet Take 1 tablet (1,000 mg) by mouth 2 times daily (with meals).           - HEMOGLOBIN A1C; Future  - Basic metabolic panel  (Ca, Cl, CO2, Creat, Gluc, K, Na, BUN); Future  - TSH with free T4 reflex; Future  - metFORMIN (GLUCOPHAGE) 1000 MG tablet; Take 1 tablet (1,000 mg) by mouth 2 times daily (with meals).    Stage 3a chronic kidney disease (H)  Change in creatinine/GFR starting with labs Nov 2023. Discussed CKD potential causes.   Labs ordered below  Ordered renal US. Normal kidneys and no stones on CT from APril 2023.  SPEP and UPEP was normal Nov 2024.   Microalbumin  mildly elevated at 33.98 from Nov 2024 . Increased lisinopril last year. Repeating today   Avoid nsaids  Good hydration   - UA Macroscopic with reflex to Microscopic and Culture; Future  - Hemoglobin; Future  - Albumin Random Urine Quantitative with Creat Ratio; Future  - Protein  random urine; Future  - Basic metabolic panel  (Ca, Cl, CO2, Creat, Gluc, K, Na, BUN); Future  - US Renal Complete Non-Vascular; Future    Peripheral polyneuropathy  Gabapentin is helpful. Sx are not progressing. Has had nornal B12, tsh, and spep/upep.   Refilling gabapentin today.   - Basic metabolic panel  (Ca, Cl, CO2, Creat, Gluc, K, Na, BUN); Future  - TSH with free T4 reflex; Future  - Vitamin B12; Future  - gabapentin (NEURONTIN) 100 MG capsule; Take 2-3 capsules (200-300 mg) by mouth at bedtime AND 1 capsule (100 mg) every morning AND 1 capsule (100 mg) daily (with lunch).    Need  "for vaccination  Advised to do rsv at her pharmacy due to  medication coverage for vaccines.     Postmenopausal estrogen deficiency  Discussed screening   RF include: post-menopausal, hx of smoking, and family history  Dental exams are up to date (has no teeth), and she has no anticipated dental work.   Getting calcium in diet.   Wt bearing exercises advised  Ordered 1st screening dexa   - DX Bone Density; Future          BMI  Estimated body mass index is 30.61 kg/m  as calculated from the following:    Height as of this encounter: 1.621 m (5' 3.82\").    Weight as of this encounter: 80.4 kg (177 lb 4.8 oz).         Follow Up: see above. 6months.  Additionally patient was instructed to contact clinic for worsening symptoms, non-improvement in time frame discussed, and for questions regarding treatment plan.   For virtual visits, the patient was advised to be seen for in person evaluation if symptoms or condition are worsening or non-improvement as expected.   Tricia Baxter PA-C    Srini Mario is a 65 year old, presenting for the following health issues:  Diabetes        5/19/2025     9:11 AM   Additional Questions   Roomed by AD   Accompanied by Self     HPI      Is fasting.     Osteoporosis screening. Never had a dexa.   I drink a lot of milk- 2 glasses. Yogurt.   Mom with osteoporosis   Never had heartburn or surgery on stomach.  Some walking sporadically     CKD Stage 3-   Change in baseline kidney function Nov 2023.   SPEP and UPEP was normal Nov 2024.   Microalbumin  mildly elevated at 33.98 from Nov 2024   On lisinopril 20mg  No nsaid medications.   Has not had renal US.  Normal CT appearance of kidneys April 2023 and no stones.   Need UA to eval for microscopic hematuria     Diabetes Follow-up  Diabetes  Last a1c: 6.7% from Nov 2024   Medications:  Diabetes Medication(s)       Biguanides       metFORMIN (GLUCOPHAGE) 1000 MG tablet Take 1 tablet (1,000 mg) by mouth 2 times daily (with meals).      " "    Blood sugars:  before supper- 105, 106. Not checking am fasting - \"I am up too early to poke my self then\"  Hypoglycemia: never   ACEi/ARB: yes- lisinopril 20mg   Statin: simvastatin   Eye exam: Feb 2025   Foot concerns/exam: neuropathy in feet- on gabapentin - not progressing. Gabapentin is helpful. Taking 200mg at bedtime and 100mg am and 100mg mid-day. No side effects.   Normal B12 and TSH.  Normal SPEP and UPEP  One sister w/diabetes and neuropathy.     How often are you checking your blood sugar? One time daily  What time of day are you checking your blood sugars (select all that apply)?  Before dinner  Have you had any blood sugars above 200?  No  Have you had any blood sugars below 70?  No  What symptoms do you notice when your blood sugar is low?  None  What concerns do you have today about your diabetes? None   Do you have any of these symptoms? (Select all that apply)  No numbness or tingling in feet.  No redness, sores or blisters on feet.  No complaints of excessive thirst.  No reports of blurry vision.  No significant changes to weight.  Have you had a diabetic eye exam in the last 12 months? Yes-  Location: [facility] - February 2025        BP Readings from Last 2 Encounters:   05/19/25 116/64   12/11/24 132/68     Hemoglobin A1C (%)   Date Value   11/20/2024 6.7 (H)   06/12/2024 6.4 (H)   06/07/2021 6.3 (H)   09/10/2020 6.5 (H)     LDL Cholesterol Calculated (mg/dL)   Date Value   11/20/2024 56   11/01/2023 64   12/09/2020 49   12/09/2019 53                 Review of Systems  Constitutional, HEENT, cardiovascular, pulmonary, gi and gu systems are negative, except as otherwise noted.      Objective    /64   Pulse 65   Temp 97  F (36.1  C) (Temporal)   Resp 16   Ht 1.621 m (5' 3.82\")   Wt 80.4 kg (177 lb 4.8 oz)   LMP  (LMP Unknown)   SpO2 99%   Breastfeeding No   BMI 30.61 kg/m    Body mass index is 30.61 kg/m .  Physical Exam   GENERAL: alert and no distress  EYES: Eyes " grossly normal to inspection, PERRL and conjunctivae and sclerae normal  HENT: ear canals and TM's normal, nose and mouth without ulcers or lesions  NECK: no adenopathy, no asymmetry, masses, or scars  RESP: lungs clear to auscultation - no rales, rhonchi or wheezes  CV: regular rate and rhythm, normal S1 S2, no S3 or S4, no murmur, click or rub, no peripheral edema  ABDOMEN: soft, nontender, no hepatosplenomegaly, no masses and bowel sounds normal  MS: no gross musculoskeletal defects noted, no edema  NEURO: Normal strength and tone, mentation intact and speech normal  PSYCH: mentation appears normal, affect normal/bright    Results for orders placed or performed in visit on 05/19/25   UA Macroscopic with reflex to Microscopic and Culture     Status: Normal    Specimen: Urine, Clean Catch   Result Value Ref Range    Color Urine Yellow Colorless, Straw, Light Yellow, Yellow    Appearance Urine Clear Clear    Glucose Urine Negative Negative mg/dL    Bilirubin Urine Negative Negative    Ketones Urine Negative Negative mg/dL    Specific Gravity Urine 1.020 1.003 - 1.035    Blood Urine Negative Negative    pH Urine 5.5 5.0 - 7.0    Protein Albumin Urine Negative Negative mg/dL    Urobilinogen Urine 0.2 0.2, 1.0 E.U./dL    Nitrite Urine Negative Negative    Leukocyte Esterase Urine Negative Negative    Narrative    Microscopic not indicated   HEMOGLOBIN A1C     Status: Abnormal   Result Value Ref Range    Estimated Average Glucose 148 (H) <117 mg/dL    Hemoglobin A1C 6.8 (H) 0.0 - 5.6 %   Hemoglobin     Status: Normal   Result Value Ref Range    Hemoglobin 12.5 11.7 - 15.7 g/dL    MCV 92 78 - 100 fL           Signed Electronically by: Tricia Baxter PA-C

## 2025-05-28 ENCOUNTER — HOSPITAL ENCOUNTER (OUTPATIENT)
Dept: ULTRASOUND IMAGING | Facility: CLINIC | Age: 65
Discharge: HOME | End: 2025-05-28
Attending: PHYSICIAN ASSISTANT
Payer: COMMERCIAL

## 2025-05-28 ENCOUNTER — HOSPITAL ENCOUNTER (OUTPATIENT)
Dept: BONE DENSITY | Facility: CLINIC | Age: 65
End: 2025-05-28
Attending: PHYSICIAN ASSISTANT
Payer: COMMERCIAL

## 2025-05-28 DIAGNOSIS — N18.31 STAGE 3A CHRONIC KIDNEY DISEASE (H): ICD-10-CM

## 2025-05-28 DIAGNOSIS — Z78.0 POSTMENOPAUSAL ESTROGEN DEFICIENCY: ICD-10-CM

## 2025-05-28 PROCEDURE — 77081 DXA BONE DENSITY APPENDICULR: CPT

## 2025-05-28 PROCEDURE — 76770 US EXAM ABDO BACK WALL COMP: CPT

## 2025-06-02 ENCOUNTER — VIRTUAL VISIT (OUTPATIENT)
Dept: FAMILY MEDICINE | Facility: CLINIC | Age: 65
End: 2025-06-02
Payer: COMMERCIAL

## 2025-06-02 DIAGNOSIS — M81.0 AGE-RELATED OSTEOPOROSIS WITHOUT CURRENT PATHOLOGICAL FRACTURE: Primary | ICD-10-CM

## 2025-06-02 PROCEDURE — 98006 SYNCH AUDIO-VIDEO EST MOD 30: CPT | Performed by: PHYSICIAN ASSISTANT

## 2025-06-02 RX ORDER — ALENDRONATE SODIUM 70 MG/1
70 TABLET ORAL
Qty: 12 TABLET | Refills: 3 | Status: SHIPPED | OUTPATIENT
Start: 2025-06-02

## 2025-06-02 NOTE — PATIENT INSTRUCTIONS
Instructions from Today's Visit:  Bone health- Osteoporosis    Start alendronate 70mg once weekly (every 7 days). Stay upright for 30-60 minutes after taking.     Start a calcium supplement 600mg once daily.  It sounds like you are also getting calcium in your  milk and in your yogurt. Continue to include these foods in your diet.     You are aiming for a total daily calcium intake of 1200 to 1500mg and vit D3 800-1000 international unit(s) . Daily calcium can come from a combination of dietary calcium (from milk, yogurt, cheese, leafy greens) and from the calcium supplement.      Start regular weight bearing exercising- walking, running, weight lifting or body weight strength exercises    DEXA is a bone density scan to measure and monitor density .    International Osteoporosis Foundation Calcium Calculator- to calculate your average weekly intake of calcium.   https://www.osteoporosis.foundation/educational-hub/topic/calcium-calculator      General Instructions After Your Visit  If you have been seen for a concern and are worsening or not improving as expected, please schedule a follow-up visit or reach out to a member of our care team or nurses if it is urgent.  We have Nurse advice available 24/7 by calling [phone].  For emergencies, please call [phone].    My Clinic Hours  I am in the office Mondays, Wednesdays, and Thursdays. Messages received outside of normal clinic hours and on my days out of the office will be reviewed by our [place] care team, but may not be addressed by me personally until I am back in the office. If you have concerns that cannot wait for my return please call the Nurse advise line [phone].    Test results  You may see your lab or test results before we can make recommendations. This is common, as sometimes we are awaiting on other labs to return or we are out of office on a particular day. Please be patient, and if you don't see a response from me or one of my colleagues within  2-3 business days, and you have a specific concern, please send us a message.    Refills  If you have run out of refills, please schedule a visit. This is generally an indication that you are due for a follow-up visit. All prescriptions are only valid for 1 year from the date written and need a visit annually to renew. Most mental health and chronic diseases we are treating (diabetes, high blood pressure, etc) require some type of visit every 6 months. We are now offering video visits! However, if physical exams are needed or it is a complex concern, we may ask you to be seen in person.    Physicals & Preventative Visits   These appointment slots fill up fast. Please consider scheduling these 2-3 months in advance to allow for the appointment time that fits you best. If you have medications ordered or other issues addressed that are not preventive at these visits, please be aware there are extra costs associated with this.

## 2025-06-02 NOTE — PROGRESS NOTES
Shelbi is a 65 year old who is being evaluated via a billable video visit.    How would you like to obtain your AVS? [employer]    If the video visit is dropped, the invitation should be resent by: Text to cell phone: [phone]    Will anyone else be joining your video visit? No        Assessment & Plan     Age-related osteoporosis without current pathological fracture  DEXA showing osteoporosis.   RF include: post-menopausal, smoking hx, and family history  Reviewed DEXA results, indication and rationale for treating.   Baseline labs in [facility].   No anticipated dental work- has complete dentures.   No prior gastric surgeries, esophageal disorders.   Start alendronate once weekly per below. Counseled on need to be upright for 30-60min after taking. Discussed possible side effects.   Start calcium 600mg+D daily, along with her other dietary calcium  Wt bearing exercises advised    - alendronate (FOSAMAX) 70 MG tablet; Take 1 tablet (70 mg) by mouth every 7 days.      Follow Up: see above. Additionally patient was instructed to contact clinic for worsening symptoms, non-improvement in time frame discussed, and for questions regarding treatment plan.   For virtual visits, the patient was advised to be seen for in person evaluation if symptoms or condition are worsening or non-improvement as expected.   Tricia Baxter PA-C      Subjective   Shelbi is a 65 year old, presenting for the following health issues:  Results      6/2/2025     1:17 PM   Additional Questions   Roomed by Completed on [employer]     Video Start Time: 1:43 PM    History of Present Illness       Reason for visit:  Bone density discussion  Symptoms include:  ?  Had these symptoms before:  No   She is taking medications regularly.        Follow up Bone Density results- DEXA showing osteoporosis.  RIGHT Hip Total: BMD: 0.690 g/cm2. T-score: -2.5. Z-score: -1.6.   LEFT Radius 33%: BMD: 0.573 g/cm2. T-score: -3.5. Z-score: -1.9.     No hx of fracture.      Calcium intake - milk 1 glass at dinner.  I eat yogurt.     No hx of stomach ulcer, gastric bypass, dysphagia, stricture or GERD.     Complete set of dentures.     Her mom has osteoporosis and is on fosamax.         Review of Systems  Constitutional, HEENT, cardiovascular, pulmonary, gi and gu systems are negative, except as otherwise noted.      Objective           Vitals:  No vitals were obtained today due to virtual visit.    Physical Exam   GENERAL: alert and no distress  EYES: Eyes grossly normal to inspection.  No discharge or erythema, or obvious scleral/conjunctival abnormalities.  HENT: Normal cephalic/atraumatic.  External ears, nose and mouth without ulcers or lesions.  No nasal drainage visible.  RESP: No audible wheeze, cough, or visible cyanosis.    SKIN: Visible skin clear. No significant rash, abnormal pigmentation or lesions.  NEURO: Cranial nerves grossly intact.  Mentation and speech appropriate for age.  PSYCH: Appropriate affect, tone, and pace of words          Video-Visit Details    Type of service:  Video Visit   Video End Time:1:56 PM  Originating Location (pt. Location): Other work    Distant Location (provider location):  On-site  Platform used for Video Visit: [employer]  Signed Electronically by: Tricia Baxter PA-C

## (undated) DEVICE — KIT PATIENT POSITIONING PIGAZZI LATEX FREE 40580

## (undated) DEVICE — SPECIMEN TRAP MUCOUS 40ML LUKI C30200A

## (undated) DEVICE — SYR 30ML LL W/O NDL 302832

## (undated) DEVICE — DAVINCI XI MONOPOLAR SCISSORS HOT SHEARS 8MM 470179

## (undated) DEVICE — PROTECTOR ARM ONE-STEP TRENDELENBURG 40418

## (undated) DEVICE — SU VICRYL 0 UR-6 27" J603H

## (undated) DEVICE — DRAPE SHEET REV FOLD 3/4 9349

## (undated) DEVICE — SOL NACL 0.9% IRRIG 1000ML BOTTLE 2F7124

## (undated) DEVICE — ENDO TRAP POLYP E-TRAP 00711099

## (undated) DEVICE — ADH SKIN CLOSURE PREMIERPRO EXOFIN 1.0ML 3470

## (undated) DEVICE — PREP CHLORAPREP 26ML TINTED ORANGE  260815

## (undated) DEVICE — SPONGE LAP 18X18" X8435

## (undated) DEVICE — SOL ADH LIQUID BENZOIN SWAB 0.6ML C1544

## (undated) DEVICE — DAVINCI XI DRAPE ARM 470015

## (undated) DEVICE — GOWN XLG DISP 9545

## (undated) DEVICE — ESU LIGASURE MARYLAND VESSEL LAP 44CM X-LONG LF1744

## (undated) DEVICE — DRSG TEGADERM 2 3/8X2 3/4" 1624W

## (undated) DEVICE — LINEN TOWEL PACK X6 WHITE 5487

## (undated) DEVICE — DAVINCI XI OBTURATOR BLADELESS 8MM 470359

## (undated) DEVICE — ENDO SNARE EXACTO COLD 9MM LOOP 2.4MMX230CM 00711115

## (undated) DEVICE — DAVINCI XI DRAPE COLUMN 470341

## (undated) DEVICE — PACK MINOR PROCEDURE CUSTOM

## (undated) DEVICE — DRAPE LAP W/ARMBOARD 29410

## (undated) DEVICE — Device

## (undated) DEVICE — DAVINCI XI SEAL UNIVERSAL 5-8MM 470361

## (undated) DEVICE — PACK GOWN 3/PK DISP XL SBA32GPFCB

## (undated) DEVICE — SUCTION MANIFOLD DORNOCH ULTRA CART UL-CL500

## (undated) DEVICE — TUBING SUCTION 6"X3/16" N56A

## (undated) DEVICE — SYR 50ML SLIP TIP W/O NDL 309654

## (undated) DEVICE — SU STRATAFIX PDS PLUS 2-0 SPIRAL SH 23CM SXPP1B433

## (undated) DEVICE — GLOVE BIOGEL PI ULTRATOUCH G SZ 7.5 42175

## (undated) DEVICE — SYR 10ML FINGER CONTROL W/O NDL 309695

## (undated) DEVICE — SYSTEM CLEARIFY VISUALIZATION 21-345

## (undated) DEVICE — DAVINCI XI NDL DRIVER MEGA SUTURE CUT 8MM 470309

## (undated) DEVICE — NDL BLUNT 18GA 1.5" FILTER 305211

## (undated) DEVICE — SOL WATER IRRIG 1000ML BOTTLE 2F7114

## (undated) DEVICE — NDL SPINAL 22GA 3.5" QUINCKE 405181

## (undated) DEVICE — ESU GROUND PAD UNIVERSAL W/O CORD

## (undated) DEVICE — DRSG PRIMAPORE 02X3" 7133

## (undated) DEVICE — NDL INSUFFLATION 120MM VERRES 172015

## (undated) DEVICE — JELLY LUBRICATING SURGILUBE 2OZ TUBE

## (undated) DEVICE — NDL ECLIPSE 25GA 1.5"

## (undated) DEVICE — SU VICRYL 3-0 SH 27" UND J416H

## (undated) DEVICE — PREP TECHNI-CARE CHLOROXYLENOL 3% 4OZ BOTTLE C222-4ZWO

## (undated) DEVICE — ENDO OBTURATOR ACCESS PORT BLADELESS 8X100MM IAS8-100LP

## (undated) DEVICE — DEVICE SUTURE GRASPER TROCAR CLOSURE 14GA PMITCSG

## (undated) DEVICE — ESU GROUND PAD ADULT REM W/15' CORD E7507DB

## (undated) DEVICE — DAVINCI XI GRASPER ENDOWRIST PROGRASP 470093

## (undated) DEVICE — LINEN TOWEL PACK X30 5481

## (undated) DEVICE — KOH COLPOTOMIZER OCCLUDER  CPO-6

## (undated) DEVICE — TUBING IRRIG CYSTO/BLADDER SET 81" LF 2C4040

## (undated) DEVICE — SU VICRYL 2-0 CT-2 27" J333H

## (undated) DEVICE — SU VICRYL 0 CT-2 27" J334H

## (undated) DEVICE — CATH TRAY FOLEY 16FR SILICONE 907416

## (undated) DEVICE — DAVINCI XI VESSEL SEALER 480322

## (undated) DEVICE — STOCKING SLEEVE COMPRESSION CALF MED

## (undated) DEVICE — TUBING CONMED AIRSEAL SMOKE EVAC INSUFFLATION ASM-EVAC

## (undated) DEVICE — SU STRATAFIX PDS PLUS 0 CT-2 9" SXPP1A446

## (undated) DEVICE — DEVICE FIXATION ABSORBLE TACK 5MM SINGLE ABSTACK30X

## (undated) DEVICE — SYR 10ML LL W/O NDL 302995

## (undated) DEVICE — SU VICRYL 0 TIE 54" J608H

## (undated) DEVICE — DAVINCI HOT SHEARS TIP COVER  400180

## (undated) DEVICE — GLOVE PROTEXIS BLUE W/NEU-THERA 8.0  2D73EB80

## (undated) DEVICE — SU MONOCRYL 3-0 PS-1 27" Y936H

## (undated) DEVICE — KIT ENDO TURNOVER/PROCEDURE CARRY-ON 101822

## (undated) DEVICE — CATH TRAY FOLEY SURESTEP 16FR W/URINE MTR STATLK LF A303416A

## (undated) DEVICE — SOL NACL 0.9% INJ 1000ML BAG 07983-09

## (undated) DEVICE — BLADE KNIFE SURG 10 371110

## (undated) DEVICE — GLOVE PROTEXIS MICRO 7.5  2D73PM75

## (undated) DEVICE — SU STRATAFIX PDS PLUS 2-0 SPIRAL SH 30CM SXPP1B416

## (undated) DEVICE — PACK GENERAL LAPAOSCOPY

## (undated) DEVICE — DRAPE MAYO STAND 23X54 8337

## (undated) DEVICE — SU MONOCRYL 4-0 PS-2 18" UND Y496G

## (undated) DEVICE — WIPES FOLEY CARE SURESTEP PROVON DFC100

## (undated) DEVICE — RETR ELEV / UTERINE MANIPULATOR V-CARE MED CUP 60-6085-201A

## (undated) RX ORDER — FENTANYL CITRATE 50 UG/ML
INJECTION, SOLUTION INTRAMUSCULAR; INTRAVENOUS
Status: DISPENSED
Start: 2017-12-05

## (undated) RX ORDER — ONDANSETRON 2 MG/ML
INJECTION INTRAMUSCULAR; INTRAVENOUS
Status: DISPENSED
Start: 2023-05-24

## (undated) RX ORDER — LIDOCAINE HYDROCHLORIDE 10 MG/ML
INJECTION, SOLUTION EPIDURAL; INFILTRATION; INTRACAUDAL; PERINEURAL
Status: DISPENSED
Start: 2023-05-24

## (undated) RX ORDER — LIDOCAINE HYDROCHLORIDE 10 MG/ML
INJECTION, SOLUTION EPIDURAL; INFILTRATION; INTRACAUDAL; PERINEURAL
Status: DISPENSED
Start: 2018-04-06

## (undated) RX ORDER — CEFAZOLIN SODIUM 1 G/3ML
INJECTION, POWDER, FOR SOLUTION INTRAMUSCULAR; INTRAVENOUS
Status: DISPENSED
Start: 2017-12-05

## (undated) RX ORDER — PROPOFOL 10 MG/ML
INJECTION, EMULSION INTRAVENOUS
Status: DISPENSED
Start: 2023-05-24

## (undated) RX ORDER — BUPIVACAINE HYDROCHLORIDE AND EPINEPHRINE 2.5; 5 MG/ML; UG/ML
INJECTION, SOLUTION EPIDURAL; INFILTRATION; INTRACAUDAL; PERINEURAL
Status: DISPENSED
Start: 2023-05-24

## (undated) RX ORDER — FENTANYL CITRATE 50 UG/ML
INJECTION, SOLUTION INTRAMUSCULAR; INTRAVENOUS
Status: DISPENSED
Start: 2023-05-24

## (undated) RX ORDER — METOPROLOL TARTRATE 1 MG/ML
INJECTION, SOLUTION INTRAVENOUS
Status: DISPENSED
Start: 2017-12-05

## (undated) RX ORDER — DOBUTAMINE HYDROCHLORIDE 200 MG/100ML
INJECTION INTRAVENOUS
Status: DISPENSED
Start: 2023-05-19

## (undated) RX ORDER — HYDROMORPHONE HYDROCHLORIDE 1 MG/ML
INJECTION, SOLUTION INTRAMUSCULAR; INTRAVENOUS; SUBCUTANEOUS
Status: DISPENSED
Start: 2017-12-05

## (undated) RX ORDER — DEXAMETHASONE SODIUM PHOSPHATE 10 MG/ML
INJECTION, SOLUTION INTRAMUSCULAR; INTRAVENOUS
Status: DISPENSED
Start: 2023-05-24

## (undated) RX ORDER — METOPROLOL TARTRATE 1 MG/ML
INJECTION, SOLUTION INTRAVENOUS
Status: DISPENSED
Start: 2023-05-24

## (undated) RX ORDER — ROCURONIUM BROMIDE 50 MG/5 ML
SYRINGE (ML) INTRAVENOUS
Status: DISPENSED
Start: 2017-12-05

## (undated) RX ORDER — FENTANYL CITRATE-0.9 % NACL/PF 10 MCG/ML
PLASTIC BAG, INJECTION (ML) INTRAVENOUS
Status: DISPENSED
Start: 2023-05-24

## (undated) RX ORDER — KETOROLAC TROMETHAMINE 30 MG/ML
INJECTION, SOLUTION INTRAMUSCULAR; INTRAVENOUS
Status: DISPENSED
Start: 2023-05-24

## (undated) RX ORDER — EPHEDRINE SULFATE 50 MG/ML
INJECTION, SOLUTION INTRAMUSCULAR; INTRAVENOUS; SUBCUTANEOUS
Status: DISPENSED
Start: 2017-12-05

## (undated) RX ORDER — ATROPINE SULFATE 0.4 MG/ML
AMPUL (ML) INJECTION
Status: DISPENSED
Start: 2023-05-19

## (undated) RX ORDER — HYDROMORPHONE HYDROCHLORIDE 1 MG/ML
INJECTION, SOLUTION INTRAMUSCULAR; INTRAVENOUS; SUBCUTANEOUS
Status: DISPENSED
Start: 2023-05-24

## (undated) RX ORDER — LABETALOL HYDROCHLORIDE 5 MG/ML
INJECTION, SOLUTION INTRAVENOUS
Status: DISPENSED
Start: 2017-12-05

## (undated) RX ORDER — CEFAZOLIN SODIUM 2 G/100ML
INJECTION, SOLUTION INTRAVENOUS
Status: DISPENSED
Start: 2017-12-05

## (undated) RX ORDER — LIDOCAINE HYDROCHLORIDE 20 MG/ML
INJECTION, SOLUTION EPIDURAL; INFILTRATION; INTRACAUDAL; PERINEURAL
Status: DISPENSED
Start: 2017-12-05

## (undated) RX ORDER — METOPROLOL TARTRATE 1 MG/ML
INJECTION, SOLUTION INTRAVENOUS
Status: DISPENSED
Start: 2023-05-19

## (undated) RX ORDER — PROPOFOL 10 MG/ML
INJECTION, EMULSION INTRAVENOUS
Status: DISPENSED
Start: 2017-12-05

## (undated) RX ORDER — DEXAMETHASONE SODIUM PHOSPHATE 4 MG/ML
INJECTION, SOLUTION INTRA-ARTICULAR; INTRALESIONAL; INTRAMUSCULAR; INTRAVENOUS; SOFT TISSUE
Status: DISPENSED
Start: 2017-12-05

## (undated) RX ORDER — ONDANSETRON 2 MG/ML
INJECTION INTRAMUSCULAR; INTRAVENOUS
Status: DISPENSED
Start: 2017-12-05

## (undated) RX ORDER — DEXMEDETOMIDINE HYDROCHLORIDE 100 UG/ML
INJECTION, SOLUTION INTRAVENOUS
Status: DISPENSED
Start: 2023-05-24